# Patient Record
Sex: FEMALE | Race: WHITE | NOT HISPANIC OR LATINO | Employment: OTHER | URBAN - METROPOLITAN AREA
[De-identification: names, ages, dates, MRNs, and addresses within clinical notes are randomized per-mention and may not be internally consistent; named-entity substitution may affect disease eponyms.]

---

## 2017-03-17 ENCOUNTER — OFFICE VISIT (OUTPATIENT)
Dept: MEDICAL GROUP | Facility: CLINIC | Age: 82
End: 2017-03-17
Payer: MEDICARE

## 2017-03-17 ENCOUNTER — HOSPITAL ENCOUNTER (OUTPATIENT)
Dept: LAB | Facility: MEDICAL CENTER | Age: 82
End: 2017-03-17
Attending: INTERNAL MEDICINE
Payer: MEDICARE

## 2017-03-17 VITALS
HEIGHT: 65 IN | OXYGEN SATURATION: 94 % | BODY MASS INDEX: 20.83 KG/M2 | SYSTOLIC BLOOD PRESSURE: 140 MMHG | WEIGHT: 125 LBS | TEMPERATURE: 98.3 F | RESPIRATION RATE: 16 BRPM | DIASTOLIC BLOOD PRESSURE: 80 MMHG | HEART RATE: 67 BPM

## 2017-03-17 DIAGNOSIS — N39.41 URINARY INCONTINENCE, URGE: ICD-10-CM

## 2017-03-17 DIAGNOSIS — I10 ESSENTIAL HYPERTENSION: ICD-10-CM

## 2017-03-17 DIAGNOSIS — R91.1 LUNG NODULE: ICD-10-CM

## 2017-03-17 DIAGNOSIS — M54.41 CHRONIC RIGHT-SIDED LOW BACK PAIN WITH RIGHT-SIDED SCIATICA: ICD-10-CM

## 2017-03-17 DIAGNOSIS — Z86.39 HISTORY OF HYPERCALCEMIA: ICD-10-CM

## 2017-03-17 DIAGNOSIS — N32.81 OVERACTIVE BLADDER: ICD-10-CM

## 2017-03-17 DIAGNOSIS — G89.29 CHRONIC RIGHT-SIDED LOW BACK PAIN WITH RIGHT-SIDED SCIATICA: ICD-10-CM

## 2017-03-17 LAB
ALBUMIN SERPL BCP-MCNC: 4.3 G/DL (ref 3.2–4.9)
ALBUMIN/GLOB SERPL: 1.5 G/DL
ALP SERPL-CCNC: 73 U/L (ref 30–99)
ALT SERPL-CCNC: 10 U/L (ref 2–50)
ANION GAP SERPL CALC-SCNC: 5 MMOL/L (ref 0–11.9)
AST SERPL-CCNC: 20 U/L (ref 12–45)
BILIRUB SERPL-MCNC: 0.6 MG/DL (ref 0.1–1.5)
BUN SERPL-MCNC: 20 MG/DL (ref 8–22)
CALCIUM SERPL-MCNC: 10 MG/DL (ref 8.5–10.5)
CHLORIDE SERPL-SCNC: 102 MMOL/L (ref 96–112)
CO2 SERPL-SCNC: 33 MMOL/L (ref 20–33)
CREAT SERPL-MCNC: 0.7 MG/DL (ref 0.5–1.4)
GLOBULIN SER CALC-MCNC: 2.9 G/DL (ref 1.9–3.5)
GLUCOSE SERPL-MCNC: 112 MG/DL (ref 65–99)
POTASSIUM SERPL-SCNC: 4.9 MMOL/L (ref 3.6–5.5)
PROT SERPL-MCNC: 7.2 G/DL (ref 6–8.2)
PTH-INTACT SERPL-MCNC: 55.3 PG/ML (ref 14–72)
SODIUM SERPL-SCNC: 140 MMOL/L (ref 135–145)

## 2017-03-17 PROCEDURE — G8432 DEP SCR NOT DOC, RNG: HCPCS | Performed by: INTERNAL MEDICINE

## 2017-03-17 PROCEDURE — 1036F TOBACCO NON-USER: CPT | Performed by: INTERNAL MEDICINE

## 2017-03-17 PROCEDURE — 36415 COLL VENOUS BLD VENIPUNCTURE: CPT

## 2017-03-17 PROCEDURE — 4040F PNEUMOC VAC/ADMIN/RCVD: CPT | Performed by: INTERNAL MEDICINE

## 2017-03-17 PROCEDURE — 80053 COMPREHEN METABOLIC PANEL: CPT

## 2017-03-17 PROCEDURE — 1101F PT FALLS ASSESS-DOCD LE1/YR: CPT | Performed by: INTERNAL MEDICINE

## 2017-03-17 PROCEDURE — G8419 CALC BMI OUT NRM PARAM NOF/U: HCPCS | Performed by: INTERNAL MEDICINE

## 2017-03-17 PROCEDURE — G8484 FLU IMMUNIZE NO ADMIN: HCPCS | Performed by: INTERNAL MEDICINE

## 2017-03-17 PROCEDURE — 99214 OFFICE O/P EST MOD 30 MIN: CPT | Performed by: INTERNAL MEDICINE

## 2017-03-17 PROCEDURE — 83970 ASSAY OF PARATHORMONE: CPT

## 2017-03-17 RX ORDER — TOLTERODINE 4 MG/1
4 CAPSULE, EXTENDED RELEASE ORAL DAILY
Qty: 30 CAP | Refills: 3 | Status: SHIPPED | OUTPATIENT
Start: 2017-03-17 | End: 2017-04-21

## 2017-03-17 NOTE — PROGRESS NOTES
"Subjective:  Katelyn is a 81 y.o. female with the following   Past Medical History   Diagnosis Date   • Hypertension    • Dyslipidemia    • Lactose intolerance    • Torn rotator cuff 2008     left, Dr. Montalvo   • OA (osteoarthritis)      right knee, shoulder   • Kidney calculi    • Raynaud's disease /phenomenon    • MEDICAL HOME    • Lung nodules      Dr. Chow, ashley, recommend yearly chest xray      Family History   Problem Relation Age of Onset   • Heart Disease Mother    • Heart Disease Father      enlarged heart   • Diabetes Son    • Cancer Maternal Grandmother      suicide    • Cancer Maternal Grandfather    • Cancer Paternal Grandmother      Lung cancer      The patient is on the following medications:   Current outpatient prescriptions:   •  tolterodine ER (DETROL LA) 4 MG CAPSULE SR 24 HR, Take 1 Cap by mouth every day., Disp: 30 Cap, Rfl: 3  •  lisinopril (PRINIVIL) 5 MG Tab, TAKE 1 TABLET BY MOUTH DAILY -GENERICFOR PRINIVIL, Disp: 90 Tab, Rfl: 0  •  atenolol (TENORMIN) 25 MG Tab, Take 1 Tab by mouth every day. Generic for Tenormin, Disp: 90 Tab, Rfl: 3    HPI; patient is here today complaining of chronic urinary frequency, nocturia and urge incontinence, requesting therapy denies having dysuria, suprapubic pain or flank pain. Patient is also on lisinopril and atenolol for hypertension denies having dry cough or chest pain, she has had history of lung nodule she is due for follow-up chest CT. Complains of intermittent right lower back pain with radiation to the right lower extremity denies any motor or sensory loss.  ROS:  See HPI    Blood pressure 140/80, pulse 67, temperature 36.8 °C (98.3 °F), resp. rate 16, height 1.651 m (5' 5\"), weight 56.7 kg (125 lb), last menstrual period 05/01/1985, SpO2 94 %.on RA  Objective:  Patient is well appearing and in no acute distress.  Pharynx is clear.  Neck is soft and supple with no cervical or supraclavicular lymphadenopathy, thyromegaly or masses, no JVD.  Lungs " clear to auscultation bilaterally with normal respiratory effort. Abdomen soft, non-tender on palpation,not distended. Heart regular rate and rhythm without murmur. Extremities without any clubbing, cyanosis, or edema.    Assessment and Plan:  1. Overactive bladder / urge incontinence . Per patient she has had this condition for number of years but never discussed that with us, requesting therapy. UA is negative for leukoesterase or nitrate, trace blood.      2. Hypertension; currently on lisinopril 5 mg and atenolol 25 mg daily      3. History of lung nodule; patient is due for follow-up chest CT.      4. Intermittent right lower back pain with radiation to right lower extremity.      5. History of borderline hypercalcemia.          Patient is advised on preventive and supportive care regarding current symptoms, Detrol for overactive bladder, follow-up with chest CT. Patient lower back was manipulated, had immediate relief of her lower back pain, instructed on daily extension exercises.   Please note that this dictation was created using voice recognition software. I have worked with consultants from the vendor as well as technical experts from [employer] to optimize the interface. I have made every reasonable attempt to correct obvious errors, but I expect that there are errors of grammar and possibly content that I did not discover before finalizing the note.

## 2017-03-17 NOTE — MR AVS SNAPSHOT
"Katelyn Hill   3/17/2017 10:00 AM   Office Visit   MRN: [id number]    Department:  [facility]   Dept Phone:  [phone]    Description:  Female : 1935   Provider:  Asif Owusu M.D.           Allergies as of 3/17/2017     Allergen Noted Reactions    Iodine 2012   Anaphylaxis, Swelling    Aspirin 2009       Clindamycin 2009       Penicillins 03/10/2008       Robitussin [Guiatuss] 2009   Diarrhea    Shellfish Allergy 2009       Tetanus Toxoid 2009       Zithromax [Azithromycin Dihydrate] 2009         You were diagnosed with     Overactive bladder   [669269]       Lung nodule   [713982]       Essential hypertension   [3234391]       History of hypercalcemia   [004553]         Vital Signs     Blood Pressure Pulse Temperature Respirations Height Weight    140/80 mmHg 67 36.8 °C (98.3 °F) 16 1.651 m (5' 5\") 56.7 kg (125 lb)    Body Mass Index Oxygen Saturation Last Menstrual Period Smoking Status          20.80 kg/m2 94% 1985 Former Smoker        Basic Information     Date Of Birth Sex Race Ethnicity Preferred Language    1935 Female White Non- English      Problem List              ICD-10-CM Priority Class Noted - Resolved    Dyslipidemia E78.5   Unknown - Present    OA (osteoarthritis) M19.90   Unknown - Present    Lactose intolerance E73.9   Unknown - Present    HTN (hypertension) I10   2010 - Present    Torn rotator cuff    2012 - Present    Lung nodules R91.8   Unknown - Present    Head ache R51   2015 - Present    Chest pain R07.9   2015 - Present      Health Maintenance        Date Due Completion Dates    IMM DTaP/Tdap/Td Vaccine (1 - Tdap) 1954 ---    IMM ZOSTER VACCINE 1995 ---    COLON CANCER SCREENING ANNUAL FIT 2016, 2014, 12/10/2012    IMM INFLUENZA (1) 2016 10/24/2015, 10/5/2014, 2011, 10/25/2009    BONE DENSITY 2021, 7/3/2006            "   Current Immunizations     13-VALENT PCV PREVNAR 10/24/2015    Influenza TIV (IM) 10/24/2015, 10/5/2014, 9/22/2011    Influenza Vaccine Pediatric 10/25/2009    Pneumococcal Vaccine (UF)Historical Data 10/25/2009    Pneumococcal polysaccharide vaccine (PPSV-23) 1/11/2013      Below and/or attached are the medications your provider expects you to take. Review all of your home medications and newly ordered medications with your provider and/or pharmacist. Follow medication instructions as directed by your provider and/or pharmacist. Please keep your medication list with you and share with your provider. Update the information when medications are discontinued, doses are changed, or new medications (including over-the-counter products) are added; and carry medication information at all times in the event of emergency situations     Allergies:  IODINE - Anaphylaxis,Swelling     ASPIRIN - (reactions not documented)     CLINDAMYCIN - (reactions not documented)     PENICILLINS - (reactions not documented)     ROBITUSSIN - Diarrhea     SHELLFISH ALLERGY - (reactions not documented)     TETANUS TOXOID - (reactions not documented)     ZITHROMAX - (reactions not documented)               Medications  Valid as of: March 17, 2017 - 10:39 AM    Generic Name Brand Name Tablet Size Instructions for use    Atenolol (Tab) TENORMIN 25 MG Take 1 Tab by mouth every day. Generic for Tenormin        Lisinopril (Tab) PRINIVIL 5 MG TAKE 1 TABLET BY MOUTH DAILY -GENERICFOR PRINIVIL        Tolterodine Tartrate (CAPSULE SR 24 HR) DETROL LA 4 MG Take 1 Cap by mouth every day.        .                 Medicines prescribed today were sent to:     [id number] [place] - [address] [facility]    [address] [place] [address]    Phone: [phone] Fax: [phone]    Open 24 Hours?: No      Medication refill instructions:       If your prescription bottle indicates you have medication refills left, it is not necessary to call  your provider’s office. Please contact your pharmacy and they will refill your medication.    If your prescription bottle indicates you do not have any refills left, you may request refills at any time through one of the following ways: The online [employer] system (except Urgent Care), by calling your provider’s office, or by asking your pharmacy to contact your provider’s office with a refill request. Medication refills are processed only during regular business hours and may not be available until the next business day. Your provider may request additional information or to have a follow-up visit with you prior to refilling your medication.   *Please Note: Medication refills are assigned a new Rx number when refilled electronically. Your pharmacy may indicate that no refills were authorized even though a new prescription for the same medication is available at the pharmacy. Please request the medicine by name with the pharmacy before contacting your provider for a refill.        Your To Do List     Future Labs/Procedures Complete By Expires    COMP METABOLIC PANEL  As directed 3/18/2018    PTH INTACT (PTH ONLY)  As directed 3/18/2018      Other Notes About Your Plan     Katelyn is a Medical Home Patient with Dr. Asif Owusu.    This appointment is 4-18-16    No Query           [facility] Status: Patient Declined

## 2017-03-20 ENCOUNTER — TELEPHONE (OUTPATIENT)
Dept: MEDICAL GROUP | Facility: CLINIC | Age: 82
End: 2017-03-20

## 2017-03-20 NOTE — TELEPHONE ENCOUNTER
----- Message from Magui Martinez M.D. sent at 3/20/2017  6:41 AM PDT -----  Please let patient know that her blood sugar was slightly elevated at 112. The remainder of her labs were within normal limits.  Dr. Martinez covering for Dr. Owusu

## 2017-03-23 DIAGNOSIS — I10 ESSENTIAL HYPERTENSION: ICD-10-CM

## 2017-03-23 RX ORDER — LISINOPRIL 5 MG/1
TABLET ORAL
Qty: 90 TAB | Refills: 0 | Status: SHIPPED | OUTPATIENT
Start: 2017-03-23 | End: 2017-06-26 | Stop reason: SDUPTHER

## 2017-03-23 NOTE — TELEPHONE ENCOUNTER
Was the patient seen in the last year in this department? Yes     Does patient have an active prescription for medications requested? No     Received Request Via: Pharmacy    RX: Lisinopril  Pharmacy: [facility]

## 2017-03-31 ENCOUNTER — TELEPHONE (OUTPATIENT)
Dept: MEDICAL GROUP | Facility: CLINIC | Age: 82
End: 2017-03-31

## 2017-03-31 ENCOUNTER — PATIENT OUTREACH (OUTPATIENT)
Dept: HEALTH INFORMATION MANAGEMENT | Facility: OTHER | Age: 82
End: 2017-03-31

## 2017-03-31 DIAGNOSIS — Z12.11 SCREENING FOR COLORECTAL CANCER: ICD-10-CM

## 2017-03-31 DIAGNOSIS — Z12.12 SCREENING FOR COLORECTAL CANCER: ICD-10-CM

## 2017-03-31 NOTE — PROGRESS NOTES
3/31/17  -  Attempt #:1    Verify PCP: yes    Communication Preference Obtained: yes     Review Care Team: yes    Annual Wellness Visit Scheduling  1. Scheduling Status:Scheduled        Care Gap Scheduling (Attempt to Schedule EACH Overdue Care Gap!)     Health Maintenance Due   Topic Date Due   • IMM DTaP/Tdap/Td Vaccine (1 - Tdap) Declined    • IMM ZOSTER VACCINE  Declined   • Annual Wellness Visit  Scheduled (Taxi voucher provided for lack of transportation)   • COLON CANCER SCREENING ANNUAL FIT  Order requested         MyChart Activation: declined

## 2017-04-03 ENCOUNTER — TELEPHONE (OUTPATIENT)
Dept: URGENT CARE | Facility: CLINIC | Age: 82
End: 2017-04-03

## 2017-04-03 NOTE — TELEPHONE ENCOUNTER
1. Caller Name: Katelyn                                         Call Back Number: [phone]      Patient approves a detailed voicemail message: yes    Patient called stating she went to get a CT scan but the  had told her they needed an order for it. Her chart has a cancelled order for the CT she believed to have. She is wondering if she still needs the CT done. Please advice.

## 2017-04-04 DIAGNOSIS — R91.8 LUNG NODULES: ICD-10-CM

## 2017-04-12 ENCOUNTER — TELEPHONE (OUTPATIENT)
Dept: MEDICAL GROUP | Facility: CLINIC | Age: 82
End: 2017-04-12

## 2017-04-12 NOTE — TELEPHONE ENCOUNTER
ANNUAL WELLNESS VISIT PRE-VISIT PLANNING     1.  Reviewed last PCP office visit assessment and plan notes: Yes    2.  If any orders were placed last visit do we have Results/Consult Notes?        •  Labs? Yes         •  Imaging?        •  Referrals?     3.  Patient Care Coordination Note was updated with diagnosis information:  No    4.  Patient is due for these Health Maintenance Topics:   Health Maintenance Due   Topic Date Due   • IMM DTaP/Tdap/Td Vaccine (1 - Tdap) 04[birth year]06/1954   • IMM ZOSTER VACCINE  04/06/1995   • Annual Wellness Visit  05/20/2015   • COLON CANCER SCREENING ANNUAL FIT  06/30/2016          •  DAVEY letter was faxed to:   for  records    5.  Immunizations were updated in [place] using [employer]?: Yes       •  [employer] Recommendations:  Tdap, Shingles, Hep A , Hep B       •  Is patient due for Tdap/Shingles? Yes.  If yes, was patient alerted of copay? Yes        7.  Updated Care Team with [employer] and all specialists?        •   Gait devices, O2, CPAP, etc: yes , [employer]       •   Eye professional: yes       •   Other specialists (GYN, cardiology, endo, etc): no    8.  Is patient in need of any refills prior to office visit? No       •    Separate refill encounter created?: no    9.  Patient was informed to arrive 15 min prior to their scheduled appointment and bring in their medication bottles? yes    10.  Patient was advised: “This is a free wellness visit. The provider will screen for medical conditions to help you stay healthy. If you have other concerns to address you may be asked to discuss these at a separate visit or there may be an additional fee.”  Yes

## 2017-04-15 ENCOUNTER — HOSPITAL ENCOUNTER (OUTPATIENT)
Dept: RADIOLOGY | Facility: MEDICAL CENTER | Age: 82
End: 2017-04-15
Attending: INTERNAL MEDICINE
Payer: MEDICARE

## 2017-04-15 DIAGNOSIS — R91.8 LUNG NODULES: ICD-10-CM

## 2017-04-15 PROCEDURE — 71250 CT THORAX DX C-: CPT

## 2017-04-21 ENCOUNTER — OFFICE VISIT (OUTPATIENT)
Dept: MEDICAL GROUP | Facility: CLINIC | Age: 82
End: 2017-04-21
Payer: MEDICARE

## 2017-04-21 VITALS
RESPIRATION RATE: 16 BRPM | SYSTOLIC BLOOD PRESSURE: 120 MMHG | HEART RATE: 61 BPM | OXYGEN SATURATION: 93 % | HEIGHT: 65 IN | BODY MASS INDEX: 21.16 KG/M2 | WEIGHT: 127 LBS | DIASTOLIC BLOOD PRESSURE: 80 MMHG | TEMPERATURE: 98.3 F

## 2017-04-21 DIAGNOSIS — I10 ESSENTIAL HYPERTENSION: ICD-10-CM

## 2017-04-21 DIAGNOSIS — R73.01 IFG (IMPAIRED FASTING GLUCOSE): ICD-10-CM

## 2017-04-21 DIAGNOSIS — R32 URINARY INCONTINENCE, UNSPECIFIED TYPE: ICD-10-CM

## 2017-04-21 DIAGNOSIS — R91.1 LUNG NODULE: ICD-10-CM

## 2017-04-21 DIAGNOSIS — N32.81 OVERACTIVE BLADDER: ICD-10-CM

## 2017-04-21 PROCEDURE — 1036F TOBACCO NON-USER: CPT | Performed by: INTERNAL MEDICINE

## 2017-04-21 PROCEDURE — 4040F PNEUMOC VAC/ADMIN/RCVD: CPT | Performed by: INTERNAL MEDICINE

## 2017-04-21 PROCEDURE — 1101F PT FALLS ASSESS-DOCD LE1/YR: CPT | Performed by: INTERNAL MEDICINE

## 2017-04-21 PROCEDURE — G8432 DEP SCR NOT DOC, RNG: HCPCS | Performed by: INTERNAL MEDICINE

## 2017-04-21 PROCEDURE — G8420 CALC BMI NORM PARAMETERS: HCPCS | Performed by: INTERNAL MEDICINE

## 2017-04-21 PROCEDURE — 99214 OFFICE O/P EST MOD 30 MIN: CPT | Performed by: INTERNAL MEDICINE

## 2017-04-21 ASSESSMENT — PATIENT HEALTH QUESTIONNAIRE - PHQ9: CLINICAL INTERPRETATION OF PHQ2 SCORE: 0

## 2017-04-21 NOTE — PROGRESS NOTES
"Subjective:  Katelyn is a 82 y.o. female with the following   Past Medical History   Diagnosis Date   • Hypertension    • Dyslipidemia    • Lactose intolerance    • Torn rotator cuff 2008     left, Dr. Montalvo   • OA (osteoarthritis)      right knee, shoulder   • Kidney calculi    • Raynaud's disease /phenomenon    • MEDICAL HOME    • Lung nodules      Dr. Chow, ashley, recommend yearly chest xray      Family History   Problem Relation Age of Onset   • Heart Disease Mother    • Heart Disease Father      enlarged heart   • Diabetes Son    • Cancer Maternal Grandmother      suicide    • Cancer Maternal Grandfather    • Cancer Paternal Grandmother      Lung cancer      The patient is on the following medications:   Current outpatient prescriptions:   •  lisinopril (PRINIVIL) 5 MG Tab, TAKE 1 TABLET BY MOUTH DAILY -GENERICFOR PRINIVIL, Disp: 90 Tab, Rfl: 0  •  atenolol (TENORMIN) 25 MG Tab, Take 1 Tab by mouth every day. Generic for Tenormin, Disp: 90 Tab, Rfl: 3    HPI; adjust her today for follow-up visit, previously treated with Detrol for overactive bladder and urge incontinence, patient could not tolerate the medication due to acute side effects of lower abdominal and pelvic pain, symptoms have resolved after being off the medication, continues with urinary issues.  Patient is on lisinopril and atenolol for hypertension denies having dry cough, shortness of breath or chest pain, stating that her lower back pain with radiculopathy has significantly improved on daily lower back extension exercises as instructed, denies having motor or sensory loss. Patient also has had history of lung nodule, recently had follow-up chest CT denies having cough or unintentional weight loss.  ROS:  See HPI    Blood pressure 120/80, pulse 61, temperature 36.8 °C (98.3 °F), resp. rate 16, height 1.651 m (5' 5\"), weight 57.607 kg (127 lb), last menstrual period 05/01/1985, SpO2 93 %.on RA  Objective:  Patient is well appearing and in no " acute distress.  Pharynx is clear.  Neck is soft and supple with no cervical or supraclavicular lymphadenopathy, thyromegaly or masses, no JVD.  Lungs clear to auscultation bilaterally with normal respiratory effort. Abdomen soft, non-tender on palpation,not distended. Heart regular rate and rhythm without murmur. Extremities without any clubbing, cyanosis, or edema.    Assessment and Plan:  1. Overactive bladder / urge incontinence . Per patient could not tolerate detrol due side effect of lower abdominal / pelvic pain.      2. Hypertension; currently on lisinopril 5 mg and atenolol 25 mg daily      3. History of lung nodule;follow-up chest CT.  FINDINGS:    THORACIC INLET: The thyroid gland appears unremarkable. No pathologic lymphadenopathy.  AXILLA: No pathologic lymphadenopathy.  MEDIASTINUM: Lack of IV contrast diminishes sensitivity for detection and characterization of soft tissue lesions. No pathologic lymphadenopathy.  HEART: Normal size. No pericardial effusion.  VASCULAR STRUCTURES: Thoracic aorta appears unremarkable. Origins of the great vessels appear normal. There is moderate calcific atherosclerotic plaque.  PLEURA: No effusion or pneumothorax.  LUNGS: There is linear scar in the LEFT lower lobe. Contiguous with this inferiorly is a 6 x 8 mm pulmonary nodule or area of nodular scar on axial image 149 of series 2. Size is unchanged.  BONES: No suspicious lytic or sclerotic bony lesions identified.  UPPER ABDOMEN: There is an incompletely imaged fluid density LEFT renal lesion measuring up to 11 mm which is likely a cyst. There is a coarse calcification in segment 2 of the liver, as before.         Impression        1.  Unchanged 7 mm LEFT lower lobe pulmonary nodule, very likely benign  2.  Atherosclerosis           4. Intermittent right lower back pain with radiation to right lower extremity. Per  patient has responded to daily lower back extension exercises as instructed.      5. Impaired fasting  glucose.   Ref. Range 8/12/2015 21:00 3/17/2017 13:18   Glucose Latest Ref Range: 65-99 mg/dL 101 (H) 112 (H)       Patient is advised on preventive and supportive care, diet and lifestyle modification, daily walking ,exercise. Referral to urology for further evaluation and monitor lab.    Patient is advised on preventive and supportive care regarding current symptoms,Please note that this dictation was created using voice recognition software. I have worked with consultants from the vendor as well as technical experts from [employer] to optimize the interface. I have made every reasonable attempt to correct obvious errors, but I expect that there are errors of grammar and possibly content that I did not discover before finalizing the note.

## 2017-04-21 NOTE — MR AVS SNAPSHOT
"Katelyn Hill   2017 10:40 AM   Office Visit   MRN: [id number]    Department:  [facility]   Dept Phone:  [phone]    Description:  Female : 1935   Provider:  Asif Owusu M.D.           Allergies as of 2017     Allergen Noted Reactions    Iodine 2012   Anaphylaxis, Swelling    Aspirin 2009       Clindamycin 2009       Penicillins 03/10/2008       Robitussin [Guiatuss] 2009   Diarrhea    Shellfish Allergy 2009       Tetanus Toxoid 2009       Zithromax [Azithromycin Dihydrate] 2009         You were diagnosed with     Urinary incontinence, unspecified type   [3756126]       Overactive bladder   [491283]       IFG (impaired fasting glucose)   [957087]         Vital Signs     Blood Pressure Pulse Temperature Respirations Height Weight    120/80 mmHg 61 36.8 °C (98.3 °F) 16 1.651 m (5' 5\") 57.607 kg (127 lb)    Body Mass Index Oxygen Saturation Last Menstrual Period Smoking Status          21.13 kg/m2 93% 1985 Former Smoker        Basic Information     Date Of Birth Sex Race Ethnicity Preferred Language    1935 Female White Non- English      Your appointments     May 19, 2017 10:00 AM   ANNUAL WELLNESS with Asif Owusu M.D., [facility]    [facility] ([facility])    [address] [place] [id number] [phone]              Problem List              ICD-10-CM Priority Class Noted - Resolved    Dyslipidemia E78.5   Unknown - Present    OA (osteoarthritis) M19.90   Unknown - Present    Lactose intolerance E73.9   Unknown - Present    HTN (hypertension) I10   2010 - Present    Torn rotator cuff    2012 - Present    Lung nodules R91.8   Unknown - Present    Head ache R51   2015 - Present    Chest pain R07.9   2015 - Present      Health Maintenance        Date Due Completion Dates    IMM DTaP/Tdap/Td Vaccine (1 - Tdap) 1954 ---    IMM ZOSTER " VACCINE 4/6/1995 ---    COLON CANCER SCREENING ANNUAL FIT 6/30/2016 6/30/2015, 5/28/2014, 12/10/2012    BONE DENSITY 9/12/2021 9/12/2011, 7/3/2006            Current Immunizations     13-VALENT PCV PREVNAR 10/24/2015    Influenza TIV (IM) 10/24/2015, 10/5/2014, 9/22/2011    Influenza Vaccine Adult HD 9/24/2016    Influenza Vaccine Pediatric 10/25/2009    Influenza Vaccine Quad Inj (Preserved) 9/28/2013, 10/21/2012    Pneumococcal Vaccine (UF)Historical Data 10/25/2009    Pneumococcal polysaccharide vaccine (PPSV-23) 1/11/2013      Below and/or attached are the medications your provider expects you to take. Review all of your home medications and newly ordered medications with your provider and/or pharmacist. Follow medication instructions as directed by your provider and/or pharmacist. Please keep your medication list with you and share with your provider. Update the information when medications are discontinued, doses are changed, or new medications (including over-the-counter products) are added; and carry medication information at all times in the event of emergency situations     Allergies:  IODINE - Anaphylaxis,Swelling     ASPIRIN - (reactions not documented)     CLINDAMYCIN - (reactions not documented)     PENICILLINS - (reactions not documented)     ROBITUSSIN - Diarrhea     SHELLFISH ALLERGY - (reactions not documented)     TETANUS TOXOID - (reactions not documented)     ZITHROMAX - (reactions not documented)               Medications  Valid as of: April 21, 2017 - 11:01 AM    Generic Name Brand Name Tablet Size Instructions for use    Atenolol (Tab) TENORMIN 25 MG Take 1 Tab by mouth every day. Generic for Tenormin        Lisinopril (Tab) PRINIVIL 5 MG TAKE 1 TABLET BY MOUTH DAILY -GENERICFOR PRINIVIL        .                 Medicines prescribed today were sent to:     [id number] - [place] - [address]    [address] [place] [address]    Phone: [phone] Fax:  [phone]    Open 24 Hours?: No      Medication refill instructions:       If your prescription bottle indicates you have medication refills left, it is not necessary to call your provider’s office. Please contact your pharmacy and they will refill your medication.    If your prescription bottle indicates you do not have any refills left, you may request refills at any time through one of the following ways: The online [employer] system (except Urgent Care), by calling your provider’s office, or by asking your pharmacy to contact your provider’s office with a refill request. Medication refills are processed only during regular business hours and may not be available until the next business day. Your provider may request additional information or to have a follow-up visit with you prior to refilling your medication.   *Please Note: Medication refills are assigned a new Rx number when refilled electronically. Your pharmacy may indicate that no refills were authorized even though a new prescription for the same medication is available at the pharmacy. Please request the medicine by name with the pharmacy before contacting your provider for a refill.        Your To Do List     Future Labs/Procedures Complete By Expires    HEMOGLOBIN A1C  As directed 4/21/2018      Referral     A referral request has been sent to our patient care coordination department. Please allow 3-5 business days for us to process this request and contact you either by phone or mail. If you do not hear from us by the 5th business day, please call us at [phone].        Other Notes About Your Plan     Katelyn is a Medical Home Patient with Dr. Asif Owusu.    This appointment is 4-18-16    No Query           [facility]: Patient Declined

## 2017-04-24 DIAGNOSIS — N30.00 ACUTE CYSTITIS WITHOUT HEMATURIA: ICD-10-CM

## 2017-04-24 RX ORDER — CIPROFLOXACIN 250 MG/1
250 TABLET, FILM COATED ORAL 2 TIMES DAILY
Qty: 10 TAB | Refills: 0 | Status: SHIPPED | OUTPATIENT
Start: 2017-04-24 | End: 2018-04-11

## 2017-04-29 ENCOUNTER — HOSPITAL ENCOUNTER (OUTPATIENT)
Dept: LAB | Facility: MEDICAL CENTER | Age: 82
End: 2017-04-29
Attending: INTERNAL MEDICINE
Payer: MEDICARE

## 2017-04-29 DIAGNOSIS — R73.01 IFG (IMPAIRED FASTING GLUCOSE): ICD-10-CM

## 2017-04-29 LAB
EST. AVERAGE GLUCOSE BLD GHB EST-MCNC: 114 MG/DL
HBA1C MFR BLD: 5.6 % (ref 0–5.6)

## 2017-04-29 PROCEDURE — 83036 HEMOGLOBIN GLYCOSYLATED A1C: CPT

## 2017-04-29 PROCEDURE — 36415 COLL VENOUS BLD VENIPUNCTURE: CPT

## 2017-05-19 ENCOUNTER — OFFICE VISIT (OUTPATIENT)
Dept: MEDICAL GROUP | Facility: PHYSICIAN GROUP | Age: 82
End: 2017-05-19
Payer: MEDICARE

## 2017-05-19 VITALS
BODY MASS INDEX: 20.79 KG/M2 | TEMPERATURE: 98.4 F | SYSTOLIC BLOOD PRESSURE: 155 MMHG | WEIGHT: 124.78 LBS | RESPIRATION RATE: 16 BRPM | HEIGHT: 65 IN | DIASTOLIC BLOOD PRESSURE: 88 MMHG | HEART RATE: 59 BPM | OXYGEN SATURATION: 95 %

## 2017-05-19 DIAGNOSIS — N32.81 OVERACTIVE BLADDER: ICD-10-CM

## 2017-05-19 DIAGNOSIS — R73.01 IMPAIRED FASTING GLUCOSE: ICD-10-CM

## 2017-05-19 DIAGNOSIS — I10 ESSENTIAL HYPERTENSION: ICD-10-CM

## 2017-05-19 DIAGNOSIS — R91.1 LUNG NODULE: ICD-10-CM

## 2017-05-19 PROCEDURE — G8432 DEP SCR NOT DOC, RNG: HCPCS | Performed by: INTERNAL MEDICINE

## 2017-05-19 PROCEDURE — 1036F TOBACCO NON-USER: CPT | Performed by: INTERNAL MEDICINE

## 2017-05-19 PROCEDURE — G0439 PPPS, SUBSEQ VISIT: HCPCS | Performed by: INTERNAL MEDICINE

## 2017-05-19 ASSESSMENT — PAIN SCALES - GENERAL: PAINLEVEL: 8=MODERATE-SEVERE PAIN

## 2017-05-19 ASSESSMENT — PATIENT HEALTH QUESTIONNAIRE - PHQ9: CLINICAL INTERPRETATION OF PHQ2 SCORE: 0

## 2017-05-19 ASSESSMENT — ACTIVITIES OF DAILY LIVING (ADL): TRANSPORTATION COMMENTS: TAXI

## 2017-05-19 NOTE — PROGRESS NOTES
Chief Complaint   Patient presents with   • Annual Wellness Visit         HPI:  Katelyn Hill is a 82 y.o. female here for Medicare Annual Wellness Visit. She is currently on lisinopril and atenolol for hypertension denies having dry cough, shortness of breath or chest pain. Patient has had overactive bladder and urinary incontinence, intolerant to oral medications, she has been waiting for urology consultation and evaluation, denies having hematuria or flank pain.        Patient Active Problem List    Diagnosis Date Noted   • Head ache 04/28/2015   • Chest pain 04/28/2015   • Lung nodules    • Torn rotator cuff 07/02/2012   • HTN (hypertension) 05/13/2010   • Dyslipidemia    • OA (osteoarthritis)    • Lactose intolerance        Current Outpatient Prescriptions   Medication Sig Dispense Refill   • ciprofloxacin (CIPRO) 250 MG Tab Take 1 Tab by mouth 2 times a day. 10 Tab 0   • lisinopril (PRINIVIL) 5 MG Tab TAKE 1 TABLET BY MOUTH DAILY -GENERICFOR PRINIVIL 90 Tab 0   • atenolol (TENORMIN) 25 MG Tab Take 1 Tab by mouth every day. Generic for Tenormin 90 Tab 3     No current facility-administered medications for this visit.        Patient is taking medications as noted in medication list.  Current supplements as per medication list.   Chronic narcotic pain medicines: no    Allergies: Iodine; Aspirin; Clindamycin; Penicillins; Robitussin; Shellfish allergy; Tetanus toxoid; and Zithromax    Current social contact/activities: dinner with son     Is patient current with immunizations?  Yes.     She  reports that she quit smoking about 32 years ago. Her smoking use included Cigarettes. She has a 30 pack-year smoking history. She has never used smokeless tobacco. She reports that she does not drink alcohol or use illicit drugs.  Counseling given: Not Answered        DPA/Advanced Directive:  Patient has Living Will, but it is not on file. Instructed to bring in a copy to scan into their chart.    ROS:    Gait:  normal  Ostomy: no   Other tubes: no   Amputations: no   Chronic oxygen use: no   Last eye exam: 02/17   Wears hearing aids: no   : Reports incontinence.       Screening:      Little interest or pleasure in doing things?  0 - not at all  Feeling down, depressed, or hopeless?  0 - not at all  Patient Health Questionnaire Score: 0  If depressive symptoms identified deferred to follow up visit unless specifically addressed in assessment and plan.    Interpretation of PHQ-9 Total Score   Score Severity   1-4 Minimal Depression   5-9 Mild Depression   10-14 Moderate Depression   15-19 Moderately Severe Depression   20-27 Severe Depression    Screening for Cognitive Impairment    Three Minute Recall (banana, sunrise, fence)  3/3    Draw clock face with all 12 numbers set to the hand to show 10 minutes past 11 o'clock  1 4/5  If cognitive concerns identified deferred to follow up visit unless specifically addressed in assessment and plan.    Fall Risk Assessment    Has the patient had two or more falls in the last year or any fall with injury in the last year?     If Fall Risk identified deferred to follow up visit unless specifically addressed in assessment and plan.    Safety Assessment    Throw rugs on floor.  Yes  Handrails on all stairs.  Yes  Good lighting in all hallways.  Yes  Difficulty hearing.  No  Patient counseled about all safety risks that were identified.    Functional Assessment ADLs    Are there any barriers preventing you from cooking for yourself or meeting nutritional needs?  No.    Are there any barriers preventing you from driving safely or obtaining transportation?  Yes. taxi  Are there any barriers preventing you from using a telephone or calling for help?  No.    Are there any barriers preventing you from shopping?  No.    Are there any barriers preventing you from taking care of your own finances?  No.    Are there any barriers preventing you from managing your medications?  No.    Are  "currently engaging any exercise or physical activity?  Yes.  walking    Health Maintenance Summary                IMM DTaP/Tdap/Td Vaccine Overdue 4/6/1954     IMM ZOSTER VACCINE Overdue 4/6/1995     Annual Wellness Visit Overdue 5/20/2015      Done 5/19/2014     COLON CANCER SCREENING ANNUAL FIT Overdue 6/30/2016      Done 6/30/2015 OCCULT BLOOD FECES IMMUNOASSAY     Patient has more history with this topic...    BONE DENSITY Next Due 9/12/2021      Done 9/12/2011 DS-BONE DENSITY STUDY (DEXA)     Patient has more history with this topic...          Patient Care Team:  Asif Owusu M.D. as PCP - General (Internal Medicine)    Social History   Substance Use Topics   • Smoking status: Former Smoker -- 1.00 packs/day for 30 years     Types: Cigarettes     Quit date: 01/01/1985   • Smokeless tobacco: Never Used      Comment: 1 ppd for 30 years, [id number]   • Alcohol Use: No     Family History   Problem Relation Age of Onset   • Heart Disease Mother    • Heart Disease Father      enlarged heart   • Diabetes Son    • Cancer Maternal Grandmother      suicide    • Cancer Maternal Grandfather    • Cancer Paternal Grandmother      Lung cancer      She  has a past medical history of Hypertension; Dyslipidemia; Lactose intolerance; Torn rotator cuff (2008); OA (osteoarthritis); Kidney calculi; Raynaud's disease /phenomenon; MEDICAL HOME; and Lung nodules. She also has no past medical history of Hypercholesteremia or Breast cancer (CMS-HCC).   Past Surgical History   Procedure Laterality Date   • Shoulder arthroscopy       right   • Appendectomy  1943   • Tonsillectomy     • Knee arthroscopy       right           Exam:     Blood pressure 155/88, pulse 59, temperature 36.9 °C (98.4 °F), resp. rate 16, height 1.651 m (5' 5\"), weight 56.6 kg (124 lb 12.5 oz), last menstrual period 05/01/1985, SpO2 95 %. Body mass index is 20.76 kg/(m^2).    Hearing ; good  Dentition fair  Alert, oriented in no acute distress.  Eye contact is " good, speech goal directed, affect calm      Assessment and Plan. The following treatment and monitoring plan is recommended:      1. Overactive bladder / urge incontinence . Per patient could not tolerate detrol due side effect of lower abdominal / pelvic pain. He has been referred to urology for further evaluation.      2. Hypertension; currently on lisinopril 5 mg and atenolol 25 mg daily, today BP is elevated.      3. History of lung nodule;follow-up chest CT.  FINDINGS:    THORACIC INLET: The thyroid gland appears unremarkable. No pathologic lymphadenopathy.  AXILLA: No pathologic lymphadenopathy.  MEDIASTINUM: Lack of IV contrast diminishes sensitivity for detection and characterization of soft tissue lesions. No pathologic lymphadenopathy.  HEART: Normal size. No pericardial effusion.  VASCULAR STRUCTURES: Thoracic aorta appears unremarkable. Origins of the great vessels appear normal. There is moderate calcific atherosclerotic plaque.  PLEURA: No effusion or pneumothorax.  LUNGS: There is linear scar in the LEFT lower lobe. Contiguous with this inferiorly is a 6 x 8 mm pulmonary nodule or area of nodular scar on axial image 149 of series 2. Size is unchanged.  BONES: No suspicious lytic or sclerotic bony lesions identified.  UPPER ABDOMEN: There is an incompletely imaged fluid density LEFT renal lesion measuring up to 11 mm which is likely a cyst. There is a coarse calcification in segment 2 of the liver, as before.           Impression          1.  Unchanged 7 mm LEFT lower lobe pulmonary nodule, very likely benign  2.  Atherosclerosis            4. Intermittent right lower back pain with radiation to right lower extremity. Per  patient has responded to daily lower back extension exercises as instructed.      5. Impaired fasting glucose.    Ref. Range  8/12/2015 21:00  3/17/2017 13:18    Glucose  Latest Ref Range: 65-99 mg/dL  101 (H)  112 (H)        Patient is advised on preventive and supportive care,  diet and lifestyle modification, daily walking ,exercise,  follow-up with urology consultation , monitor blood pressure and follow labs.              Services suggested:   Health Care Screening recommendations as per orders if indicated.  Referrals offered: PT/OT/Nutrition counseling/Behavioral Health/Smoking cessation as per orders if indicated.    Discussion today about general wellness and lifestyle habits:    · Prevent falls and reduce trip hazards; Cautioned about securing or removing rugs.  · Have a working fire alarm and carbon monoxide detector;   · Engage in regular physical activity and social activities       Follow-up: No Follow-up on file.

## 2017-05-19 NOTE — MR AVS SNAPSHOT
"Katelyn Hill   2017 10:00 AM   Office Visit   MRN: [id number]    Department:  [facility]   Dept Phone:  [phone]    Description:  Female : 1935   Provider:  Asif Owusu M.D.; [facility]            Reason for Visit     Annual Wellness Visit           Allergies as of 2017     Allergen Noted Reactions    Iodine 2012   Anaphylaxis, Swelling    Aspirin 2009       Clindamycin 2009       Penicillins 03/10/2008       Robitussin [Guiatuss] 2009   Diarrhea    Shellfish Allergy 2009       Tetanus Toxoid 2009       Zithromax [Azithromycin Dihydrate] 2009         Vital Signs     Blood Pressure Pulse Temperature Respirations Height Weight    155/88 mmHg 59 36.9 °C (98.4 °F) 16 1.651 m (5' 5\") 56.6 kg (124 lb 12.5 oz)    Body Mass Index Oxygen Saturation Last Menstrual Period Smoking Status          20.76 kg/m2 95% 1985 Former Smoker        Basic Information     Date Of Birth Sex Race Ethnicity Preferred Language    1935 Female White Non- English      Problem List              ICD-10-CM Priority Class Noted - Resolved    Dyslipidemia E78.5   Unknown - Present    OA (osteoarthritis) M19.90   Unknown - Present    Lactose intolerance E73.9   Unknown - Present    HTN (hypertension) I10   2010 - Present    Torn rotator cuff    2012 - Present    Lung nodules R91.8   Unknown - Present    Head ache R51   2015 - Present    Chest pain R07.9   2015 - Present      Health Maintenance        Date Due Completion Dates    IMM DTaP/Tdap/Td Vaccine (1 - Tdap) 1954 ---    IMM ZOSTER VACCINE 1995 ---    COLON CANCER SCREENING ANNUAL FIT 2016, 2014, 12/10/2012    BONE DENSITY 2021, 7/3/2006            Current Immunizations     13-VALENT PCV PREVNAR 10/24/2015    Influenza TIV (IM) 10/24/2015, 10/5/2014, 2011    Influenza Vaccine Adult HD 2016    Influenza Vaccine " Pediatric 10/25/2009    Influenza Vaccine Quad Inj (Preserved) 9/28/2013, 10/21/2012    Pneumococcal Vaccine (UF)Historical Data 10/25/2009    Pneumococcal polysaccharide vaccine (PPSV-23) 1/11/2013      Below and/or attached are the medications your provider expects you to take. Review all of your home medications and newly ordered medications with your provider and/or pharmacist. Follow medication instructions as directed by your provider and/or pharmacist. Please keep your medication list with you and share with your provider. Update the information when medications are discontinued, doses are changed, or new medications (including over-the-counter products) are added; and carry medication information at all times in the event of emergency situations     Allergies:  IODINE - Anaphylaxis,Swelling     ASPIRIN - (reactions not documented)     CLINDAMYCIN - (reactions not documented)     PENICILLINS - (reactions not documented)     ROBITUSSIN - Diarrhea     SHELLFISH ALLERGY - (reactions not documented)     TETANUS TOXOID - (reactions not documented)     ZITHROMAX - (reactions not documented)               Medications  Valid as of: May 19, 2017 - 11:02 AM    Generic Name Brand Name Tablet Size Instructions for use    Atenolol (Tab) TENORMIN 25 MG Take 1 Tab by mouth every day. Generic for Tenormin        Ciprofloxacin HCl (Tab) CIPRO 250 MG Take 1 Tab by mouth 2 times a day.        Lisinopril (Tab) PRINIVIL 5 MG TAKE 1 TABLET BY MOUTH DAILY -GENERICFOR PRINIVIL        .                 Medicines prescribed today were sent to:     [id number] - [place] - [address] [facility]    [address] [facility] [place] [address]    Phone: [phone] Fax: [phone]    Open 24 Hours?: No      Medication refill instructions:       If your prescription bottle indicates you have medication refills left, it is not necessary to call your provider’s office. Please contact your pharmacy and they will refill your  medication.    If your prescription bottle indicates you do not have any refills left, you may request refills at any time through one of the following ways: The online [employer] system (except Urgent Care), by calling your provider’s office, or by asking your pharmacy to contact your provider’s office with a refill request. Medication refills are processed only during regular business hours and may not be available until the next business day. Your provider may request additional information or to have a follow-up visit with you prior to refilling your medication.   *Please Note: Medication refills are assigned a new Rx number when refilled electronically. Your pharmacy may indicate that no refills were authorized even though a new prescription for the same medication is available at the pharmacy. Please request the medicine by name with the pharmacy before contacting your provider for a refill.        Other Notes About Your Plan     Katelyn is a Medical Home Patient with Dr. Asif Owusu.    This appointment is 4-18-16    No Query           [employer] Status: Patient Declined

## 2017-06-26 DIAGNOSIS — I10 ESSENTIAL HYPERTENSION: ICD-10-CM

## 2017-06-26 RX ORDER — LISINOPRIL 5 MG/1
TABLET ORAL
Qty: 90 TAB | Refills: 0 | Status: SHIPPED | OUTPATIENT
Start: 2017-06-26 | End: 2017-09-26 | Stop reason: SDUPTHER

## 2017-09-26 DIAGNOSIS — I10 ESSENTIAL HYPERTENSION: ICD-10-CM

## 2017-09-27 RX ORDER — LISINOPRIL 5 MG/1
TABLET ORAL
Qty: 90 TAB | Refills: 0 | Status: SHIPPED | OUTPATIENT
Start: 2017-09-27 | End: 2017-12-26 | Stop reason: SDUPTHER

## 2017-10-02 DIAGNOSIS — I10 ESSENTIAL HYPERTENSION: ICD-10-CM

## 2017-10-02 RX ORDER — ATENOLOL 25 MG/1
25 TABLET ORAL DAILY
Qty: 90 TAB | Refills: 3 | Status: SHIPPED | OUTPATIENT
Start: 2017-10-02 | End: 2018-01-10 | Stop reason: RX

## 2017-11-02 ENCOUNTER — HOSPITAL ENCOUNTER (OUTPATIENT)
Dept: LAB | Facility: MEDICAL CENTER | Age: 82
End: 2017-11-02
Attending: PHYSICIAN ASSISTANT
Payer: MEDICARE

## 2017-11-02 ENCOUNTER — HOSPITAL ENCOUNTER (OUTPATIENT)
Dept: RADIOLOGY | Facility: MEDICAL CENTER | Age: 82
End: 2017-11-02
Attending: PHYSICIAN ASSISTANT
Payer: MEDICARE

## 2017-11-02 ENCOUNTER — OFFICE VISIT (OUTPATIENT)
Dept: URGENT CARE | Facility: PHYSICIAN GROUP | Age: 82
End: 2017-11-02
Payer: MEDICARE

## 2017-11-02 VITALS
HEART RATE: 84 BPM | WEIGHT: 128 LBS | DIASTOLIC BLOOD PRESSURE: 78 MMHG | HEIGHT: 65 IN | BODY MASS INDEX: 21.33 KG/M2 | RESPIRATION RATE: 14 BRPM | TEMPERATURE: 97.5 F | SYSTOLIC BLOOD PRESSURE: 140 MMHG | OXYGEN SATURATION: 92 %

## 2017-11-02 DIAGNOSIS — R10.33 PERIUMBILICAL ABDOMINAL PAIN: ICD-10-CM

## 2017-11-02 DIAGNOSIS — N20.0 NEPHROLITHIASIS: ICD-10-CM

## 2017-11-02 DIAGNOSIS — R19.7 DIARRHEA, UNSPECIFIED TYPE: ICD-10-CM

## 2017-11-02 LAB
ALBUMIN SERPL BCP-MCNC: 4.4 G/DL (ref 3.2–4.9)
ALBUMIN/GLOB SERPL: 1.5 G/DL
ALP SERPL-CCNC: 67 U/L (ref 30–99)
ALT SERPL-CCNC: 10 U/L (ref 2–50)
ANION GAP SERPL CALC-SCNC: 8 MMOL/L (ref 0–11.9)
APPEARANCE UR: CLEAR
AST SERPL-CCNC: 20 U/L (ref 12–45)
BASOPHILS # BLD AUTO: 0.3 % (ref 0–1.8)
BASOPHILS # BLD: 0.03 K/UL (ref 0–0.12)
BILIRUB SERPL-MCNC: 0.7 MG/DL (ref 0.1–1.5)
BILIRUB UR STRIP-MCNC: NEGATIVE MG/DL
BUN SERPL-MCNC: 16 MG/DL (ref 8–22)
CALCIUM SERPL-MCNC: 10.3 MG/DL (ref 8.5–10.5)
CHLORIDE SERPL-SCNC: 102 MMOL/L (ref 96–112)
CO2 SERPL-SCNC: 30 MMOL/L (ref 20–33)
COLOR UR AUTO: YELLOW
CREAT SERPL-MCNC: 0.52 MG/DL (ref 0.5–1.4)
EOSINOPHIL # BLD AUTO: 0.11 K/UL (ref 0–0.51)
EOSINOPHIL NFR BLD: 1.2 % (ref 0–6.9)
ERYTHROCYTE [DISTWIDTH] IN BLOOD BY AUTOMATED COUNT: 47.4 FL (ref 35.9–50)
GFR SERPL CREATININE-BSD FRML MDRD: >60 ML/MIN/1.73 M 2
GLOBULIN SER CALC-MCNC: 3 G/DL (ref 1.9–3.5)
GLUCOSE SERPL-MCNC: 92 MG/DL (ref 65–99)
GLUCOSE UR STRIP.AUTO-MCNC: NEGATIVE MG/DL
HCT VFR BLD AUTO: 46.8 % (ref 37–47)
HGB BLD-MCNC: 14.7 G/DL (ref 12–16)
IMM GRANULOCYTES # BLD AUTO: 0.02 K/UL (ref 0–0.11)
IMM GRANULOCYTES NFR BLD AUTO: 0.2 % (ref 0–0.9)
KETONES UR STRIP.AUTO-MCNC: NEGATIVE MG/DL
LEUKOCYTE ESTERASE UR QL STRIP.AUTO: NEGATIVE
LIPASE SERPL-CCNC: 44 U/L (ref 11–82)
LYMPHOCYTES # BLD AUTO: 0.94 K/UL (ref 1–4.8)
LYMPHOCYTES NFR BLD: 10 % (ref 22–41)
MCH RBC QN AUTO: 30.1 PG (ref 27–33)
MCHC RBC AUTO-ENTMCNC: 31.4 G/DL (ref 33.6–35)
MCV RBC AUTO: 95.7 FL (ref 81.4–97.8)
MONOCYTES # BLD AUTO: 0.77 K/UL (ref 0–0.85)
MONOCYTES NFR BLD AUTO: 8.2 % (ref 0–13.4)
NEUTROPHILS # BLD AUTO: 7.49 K/UL (ref 2–7.15)
NEUTROPHILS NFR BLD: 80.1 % (ref 44–72)
NITRITE UR QL STRIP.AUTO: NEGATIVE
NRBC # BLD AUTO: 0 K/UL
NRBC BLD AUTO-RTO: 0 /100 WBC
PH UR STRIP.AUTO: 6 [PH] (ref 5–8)
PLATELET # BLD AUTO: 217 K/UL (ref 164–446)
PMV BLD AUTO: 10 FL (ref 9–12.9)
POTASSIUM SERPL-SCNC: 4.9 MMOL/L (ref 3.6–5.5)
PROT SERPL-MCNC: 7.4 G/DL (ref 6–8.2)
PROT UR QL STRIP: NEGATIVE MG/DL
RBC # BLD AUTO: 4.89 M/UL (ref 4.2–5.4)
RBC UR QL AUTO: NORMAL
SODIUM SERPL-SCNC: 140 MMOL/L (ref 135–145)
SP GR UR STRIP.AUTO: 1.02
UROBILINOGEN UR STRIP-MCNC: NEGATIVE MG/DL
WBC # BLD AUTO: 9.4 K/UL (ref 4.8–10.8)

## 2017-11-02 PROCEDURE — 85025 COMPLETE CBC W/AUTO DIFF WBC: CPT

## 2017-11-02 PROCEDURE — 81002 URINALYSIS NONAUTO W/O SCOPE: CPT | Performed by: PHYSICIAN ASSISTANT

## 2017-11-02 PROCEDURE — 99214 OFFICE O/P EST MOD 30 MIN: CPT | Performed by: PHYSICIAN ASSISTANT

## 2017-11-02 PROCEDURE — 83690 ASSAY OF LIPASE: CPT

## 2017-11-02 PROCEDURE — 80053 COMPREHEN METABOLIC PANEL: CPT

## 2017-11-02 PROCEDURE — 74176 CT ABD & PELVIS W/O CONTRAST: CPT

## 2017-11-02 PROCEDURE — 36415 COLL VENOUS BLD VENIPUNCTURE: CPT

## 2017-11-02 NOTE — LETTER
November 2, 2017         Patient: Katelyn Gil Destefani   YOB: 1935   Date of Visit: 11/2/2017         Diarrea   (Diarrhea)  La diarrea consiste en evacuaciones intestinales frecuentes, blandas o acuosas. Puede hacerlo sentir débil y deshidratado. La deshidratación puede hacer que se sienta cansado, sediento, tener la boca seca y que haya disminución de orina, que a menudo es de color amarillo oscuro. La diarrea es un signo de otro problema, generalmente vijaya infección que no durará mucho tiempo. En la mayoría de los casos, la diarrea dura típicamente 2 a 3 días. Sin embargo, puede durar más tiempo si se trata de un signo de algo más brannon. Es importante tratar la diarrea alondra lo indique finch médico para disminuir o prevenir futuros episodios de diarrea.   CAUSAS   Algunas causas comunes son:   · Infecciones gastrointestinales causadas por virus, bacterias o parásitos.  · Intoxicación alimentaria o alergias a los alimentos.  · Ciertos medicamentos, alondra los antibióticos, quimioterapia y laxantes.  · Edulcorantes artificiales y fructosa.  · Los trastornos digestivos.  INSTRUCCIONES PARA EL CUIDADO EN EL HOGAR   · Asegure vijaya adecuada ingesta de líquidos (hidratación). Evite los líquidos que contengan azúcares simples o las bebidas deportivas, los jugos de frutas, los productos derivados de la leche entera y las gaseosas. Si jazmyn la cantidad suficiente de líquidos, la orina debe ser marta o amarillo pálido. Vijaya solución de rehidratación oral se puede comprar en las farmacias, en las tiendas minoristas y por Internet. Se puede preparar vijaya solución de rehidratación oral casera con los siguientes ingredientes:  ¨  - cucharadita de sal.  ¨ ¾ cucharadita de bicarbonato.  ¨  de cucharadita de sal sustituta que contenga cloruro de potasio.  ¨ 1  cucharada de azúcar.  ¨ 1l (34 onzas) de agua.  · Ciertos alimentos y bebidas pueden aumentar la velocidad a la que el alimento se mueve a través del tracto  gastrointestinal (GI). Estos alimentos y bebidas deben evitarse e incluyen:  ¨ Bebidas alcohólicas y con cafeína.  ¨ Alimentos ricos en fibra, alondra frutas y verduras, nueces, semillas, panes y cereales integrales.  ¨ Alimentos y bebidas endulzados con alcoholes de azúcar, tales alondra xilitol, sorbitol, y manitol.  · Algunos alimentos pueden ser chicho tolerados y puede ayudar a espesar las heces, incluyendo:  ¨ Alimentos con almidón, alondra arroz, pan, pasta, cereales bajos en azúcar, ilir, sémola de maíz, issac al horno, galletas y panecillos.  ¨ Bananas.  ¨ Puré de manzana.  · Agregue alimentos ricos en probióticos a la dieta del stuart para ayudar a aumentar las bacterias saludables en el tracto gastrointestinal, alondra el yogur y productos lácteos fermentados.  · Lávese chicho las jose después de cada episodio de diarrea.  · [place] sólo medicamentos de venta jayde o recetados, según las indicaciones del médico.  · [place] un baño caliente para ayudar a disminuir ardor o dolor por los episodios frecuentes de diarrea.  SOLICITE ATENCIÓN MÉDICA DE INMEDIATO SI:   · No puede retener los líquidos.  · Tiene vómitos persistentes.  · Observa art en la materia fecal, o las heces son negras y de aspecto alquitranado.  · No hay emisión de orina divya 6 a 8 horas o elimina vijaya pequeña cantidad de orina muy oscura.  · Tiene dolor abdominal que aumenta o se localiza.  · Está muy mareado o se desvanece.  · Sufre un dolor intenso de ben.  · La diarrea empeora o no mejora.  · Tiene fiebre o síntomas que persisten divya más de 2 o 3 kayden.  · Tiene fiebre y los síntomas empeoran de manera súbita.  ASEGÚRESE DE QUE:   · Comprende estas instrucciones.  · Controlará finch enfermedad.  · Solicitará ayuda de inmediato si no mejora o si empeora.     Esta información no tiene alondra fin reemplazar el consejo del médico. Asegúrese de hacerle al médico cualquier pregunta que tenga.     Document Released: 12/18/2006 Document Revised:  12/04/2013  [employer] Interactive Patient Education ©2016 [employer]  Diarrhea  Diarrhea is frequent loose and watery bowel movements. It can cause you to feel weak and dehydrated. Dehydration can cause you to become tired and thirsty, have a dry mouth, and have decreased urination that often is dark yellow. Diarrhea is a sign of another problem, most often an infection that will not last long. In most cases, diarrhea typically lasts 2-3 days. However, it can last longer if it is a sign of something more serious. It is important to treat your diarrhea as directed by your caregiver to lessen or prevent future episodes of diarrhea.  CAUSES   Some common causes include:  · Gastrointestinal infections caused by viruses, bacteria, or parasites.  · Food poisoning or food allergies.  · Certain medicines, such as antibiotics, chemotherapy, and laxatives.  · Artificial sweeteners and fructose.  · Digestive disorders.  HOME CARE INSTRUCTIONS  · Ensure adequate fluid intake (hydration): Have 1 cup (8 oz) of fluid for each diarrhea episode. Avoid fluids that contain simple sugars or sports drinks, fruit juices, whole milk products, and sodas. Your urine should be clear or pale yellow if you are drinking enough fluids. Hydrate with an oral rehydration solution that you can purchase at pharmacies, retail stores, and online. You can prepare an oral rehydration solution at home by mixing the following ingredients together:  ¨  - tsp table salt.  ¨ ¾ tsp baking soda.  ¨  tsp salt substitute containing potassium chloride.  ¨ 1  tablespoons sugar.  ¨ 1 L (34 oz) of water.  · Certain foods and beverages may increase the speed at which food moves through the gastrointestinal (GI) tract. These foods and beverages should be avoided and include:  ¨ Caffeinated and alcoholic beverages.  ¨ High-fiber foods, such as raw fruits and vegetables, nuts, seeds, and whole grain breads and cereals.  ¨ Foods and beverages sweetened with sugar  alcohols, such as xylitol, sorbitol, and mannitol.  · Some foods may be well tolerated and may help thicken stool including:  ¨ Starchy foods, such as rice, toast, pasta, low-sugar cereal, oatmeal, grits, baked potatoes, crackers, and bagels.  ¨ Bananas.  ¨ Applesauce.  · Add probiotic-rich foods to help increase healthy bacteria in the GI tract, such as yogurt and fermented milk products.  · Wash your hands well after each diarrhea episode.  · Only take over-the-counter or prescription medicines as directed by your caregiver.  · Take a warm bath to relieve any burning or pain from frequent diarrhea episodes.  SEEK IMMEDIATE MEDICAL CARE IF:   · You are unable to keep fluids down.  · You have persistent vomiting.  · You have blood in your stool, or your stools are black and tarry.  · You do not urinate in 6-8 hours, or there is only a small amount of very dark urine.  · You have abdominal pain that increases or localizes.  · You have weakness, dizziness, confusion, or light-headedness.  · You have a severe headache.  · Your diarrhea gets worse or does not get better.  · You have a fever or persistent symptoms for more than 2-3 days.  · You have a fever and your symptoms suddenly get worse.  MAKE SURE YOU:   · Understand these instructions.  · Will watch your condition.  · Will get help right away if you are not doing well or get worse.     This information is not intended to replace advice given to you by your health care provider. Make sure you discuss any questions you have with your health care provider.     Document Released: 12/08/2003 Document Revised: 01/08/2016 Document Reviewed: 08/25/2013  [employer] Interactive Patient Education ©2016 [employer]          Rosana Mejia P.A.-C.  Electronically Signed

## 2017-11-03 ASSESSMENT — ENCOUNTER SYMPTOMS
BELCHING: 0
NAUSEA: 0
CONSTIPATION: 0
VOMITING: 0
MUSCULOSKELETAL NEGATIVE: 1
SHORTNESS OF BREATH: 0
DIARRHEA: 1
ABDOMINAL PAIN: 1
DIZZINESS: 0
BLOOD IN STOOL: 0
HEADACHES: 0
FEVER: 0
CHILLS: 0

## 2017-11-03 ASSESSMENT — CROHNS DISEASE ACTIVITY INDEX (CDAI): CDAI SCORE: 0

## 2017-11-03 NOTE — PROGRESS NOTES
"Subjective:      Katelyn Hill is a 82 y.o. female who presents with Abdominal Pain (abdominal pain diarrhea )            Abdominal Pain   This is a new problem. The current episode started today. The onset quality is gradual. The problem occurs constantly. The problem has been unchanged. The pain is located in the periumbilical region. The pain is at a severity of 5/10. The pain is moderate. The quality of the pain is colicky. The abdominal pain does not radiate. Associated symptoms include diarrhea. Pertinent negatives include no belching, constipation, dysuria, fever, frequency, headaches, hematuria, nausea or vomiting. The pain is aggravated by bowel movement. The pain is relieved by being still. Prior diagnostic workup includes CT scan. Her past medical history is significant for abdominal surgery. There is no history of Crohn's disease, irritable bowel syndrome or PUD.     Patient presents to urgent care reporting diarrhea starting this morning with associated periumbilical abdominal pain. She denies history of any known colon disease. She reports the diarrhea is watery, no blood or mucous noted. She denies any suspect food intake, recent travel, recent hospitalization, or history of c diff infections.     Review of Systems   Constitutional: Negative for chills and fever.   Respiratory: Negative for shortness of breath.    Cardiovascular: Negative for chest pain.   Gastrointestinal: Positive for abdominal pain and diarrhea. Negative for blood in stool, constipation, nausea and vomiting.   Genitourinary: Negative.  Negative for dysuria, frequency and hematuria.   Musculoskeletal: Negative.    Neurological: Negative for dizziness and headaches.          Objective:     /78   Pulse 84   Temp 36.4 °C (97.5 °F)   Resp 14   Ht 1.651 m (5' 5\")   Wt 58.1 kg (128 lb)   LMP 05/01/1985   SpO2 92%   BMI 21.30 kg/m²      Physical Exam   Constitutional: She is oriented to person, place, and time. " She appears well-developed and well-nourished. No distress.   HENT:   Head: Normocephalic and atraumatic.   Eyes: Pupils are equal, round, and reactive to light.   Neck: Normal range of motion.   Cardiovascular: Normal rate.    Pulmonary/Chest: Effort normal.   Abdominal: Soft. Normal appearance and bowel sounds are normal. She exhibits no distension. There is tenderness in the periumbilical area. There is no tenderness at McBurney's point and negative Keys's sign.   No CVAT bilaterally   Musculoskeletal: Normal range of motion.   Neurological: She is alert and oriented to person, place, and time.   Skin: Skin is warm and dry. She is not diaphoretic.   Psychiatric: She has a normal mood and affect. Her behavior is normal.   Nursing note and vitals reviewed.            POCT Urinalysis:  Component Results     Component Value Ref Range & Units Status   POC Color Yellow Negative Final   POC Appearance Clear Negative Final   POC Leukocyte Esterase Negative Negative Final   POC Nitrites Negative Negative Final   POC Urobiligen Negative Negative (0.2) mg/dL Final   POC Protein Negative Negative mg/dL Final   POC Urine PH 6.0 5.0 - 8.0 Final   POC Blood SM Negative Final   POC Specific Gravity 1.025 <1.005 - >1.030 Final   POC Ketones Negative Negative mg/dL Final   POC Biliruben Negative Negative mg/dL Final   POC Glucose Negative Negative mg/dL Final   Last Resulted Time   Thu Nov 2, 2017  9:38 AM       Assessment/Plan:     1. Diarrhea, unspecified type    Advised patient to start taking OTC imodium and probiotics daily for diarrhea. Handout given for diet to follow while dealing with diarrhea. Increased fluids to prevent dehydration. Call or return to office if symptoms persist or worsen. The patient demonstrated a good understanding and agreed with the treatment plan.    2. Periumbilical abdominal pain  - POCT Urinalysis --> small blood, otherwise normal  - CBC WITH DIFFERENTIAL; Future  - COMP METABOLIC PANEL;  Future  - LIPASE; Future  - CT-ABDOMEN-PELVIS W/O; Future  Impression       1.  No acute abnormality within the abdomen or pelvis    2.  Minimal atelectasis    3.  Hepatic calcification consistent with granuloma    4.  Aortic atherosclerotic plaque    5.  6 mm nonobstructive right renal calculus       3. Nephrolithiasis  - REFERRAL TO UROLOGY

## 2017-11-03 NOTE — PATIENT INSTRUCTIONS
Diarrea   (Diarrhea)  La diarrea consiste en evacuaciones intestinales frecuentes, blandas o acuosas. Puede hacerlo sentir débil y deshidratado. La deshidratación puede hacer que se sienta cansado, sediento, tener la boca seca y que haya disminución de orina, que a menudo es de color amarillo oscuro. La diarrea es un signo de otro problema, generalmente vijaya infección que no durará mucho tiempo. En la mayoría de los casos, la diarrea dura típicamente 2 a 3 días. Sin embargo, puede durar más tiempo si se trata de un signo de algo más brannon. Es importante tratar la diarrea alondra lo indique finch médico para disminuir o prevenir futuros episodios de diarrea.   CAUSAS   Algunas causas comunes son:   · Infecciones gastrointestinales causadas por virus, bacterias o parásitos.  · Intoxicación alimentaria o alergias a los alimentos.  · Ciertos medicamentos, alondra los antibióticos, quimioterapia y laxantes.  · Edulcorantes artificiales y fructosa.  · Los trastornos digestivos.  INSTRUCCIONES PARA EL CUIDADO EN EL HOGAR   · Asegure vijaya adecuada ingesta de líquidos (hidratación). Evite los líquidos que contengan azúcares simples o las bebidas deportivas, los jugos de frutas, los productos derivados de la leche entera y las gaseosas. Si jazmyn la cantidad suficiente de líquidos, la orina debe ser marta o amarillo pálido. Vijaya solución de rehidratación oral se puede comprar en las farmacias, en las tiendas minoristas y por Internet. Se puede preparar vijaya solución de rehidratación oral casera con los siguientes ingredientes:  ¨  - cucharadita de sal.  ¨ ¾ cucharadita de bicarbonato.  ¨  de cucharadita de sal sustituta que contenga cloruro de potasio.  ¨ 1  cucharada de azúcar.  ¨ 1l (34 onzas) de agua.  · Ciertos alimentos y bebidas pueden aumentar la velocidad a la que el alimento se mueve a través del tracto gastrointestinal (GI). Estos alimentos y bebidas deben evitarse e incluyen:  ¨ Bebidas alcohólicas y con cafeína.  ¨ Alimentos  ricos en fibra, alondra frutas y verduras, nueces, semillas, panes y cereales integrales.  ¨ Alimentos y bebidas endulzados con alcoholes de azúcar, tales alondra xilitol, sorbitol, y manitol.  · Algunos alimentos pueden ser chicho tolerados y puede ayudar a espesar las heces, incluyendo:  ¨ Alimentos con almidón, alondra arroz, pan, pasta, cereales bajos en azúcar, ilir, sémola de maíz, issac al horno, galletas y panecillos.  ¨ Bananas.  ¨ Puré de manzana.  · Agregue alimentos ricos en probióticos a la dieta del stuart para ayudar a aumentar las bacterias saludables en el tracto gastrointestinal, alondra el yogur y productos lácteos fermentados.  · Lávese chicho las jose después de cada episodio de diarrea.  · La Rue sólo medicamentos de venta jayde o recetados, según las indicaciones del médico.  · La Rue un baño caliente para ayudar a disminuir ardor o dolor por los episodios frecuentes de diarrea.  SOLICITE ATENCIÓN MÉDICA DE INMEDIATO SI:   · No puede retener los líquidos.  · Tiene vómitos persistentes.  · Observa art en la materia fecal, o las heces son negras y de aspecto alquitranado.  · No hay emisión de orina divya 6 a 8 horas o elimina vijaya pequeña cantidad de orina muy oscura.  · Tiene dolor abdominal que aumenta o se localiza.  · Está muy mareado o se desvanece.  · Sufre un dolor intenso de ben.  · La diarrea empeora o no mejora.  · Tiene fiebre o síntomas que persisten divya más de 2 o 3 kayden.  · Tiene fiebre y los síntomas empeoran de manera súbita.  ASEGÚRESE DE QUE:   · Comprende estas instrucciones.  · Controlará finch enfermedad.  · Solicitará ayuda de inmediato si no mejora o si empeora.     Esta información no tiene alondra fin reemplazar el consejo del médico. Asegúrese de hacerle al médico cualquier pregunta que tenga.     Document Released: 12/18/2006 Document Revised: 12/04/2013  Elsevier Interactive Patient Education ©2016 [employer]  Diarrhea  Diarrhea is frequent loose and watery bowel movements. It  can cause you to feel weak and dehydrated. Dehydration can cause you to become tired and thirsty, have a dry mouth, and have decreased urination that often is dark yellow. Diarrhea is a sign of another problem, most often an infection that will not last long. In most cases, diarrhea typically lasts 2-3 days. However, it can last longer if it is a sign of something more serious. It is important to treat your diarrhea as directed by your caregiver to lessen or prevent future episodes of diarrhea.  CAUSES   Some common causes include:  · Gastrointestinal infections caused by viruses, bacteria, or parasites.  · Food poisoning or food allergies.  · Certain medicines, such as antibiotics, chemotherapy, and laxatives.  · Artificial sweeteners and fructose.  · Digestive disorders.  HOME CARE INSTRUCTIONS  · Ensure adequate fluid intake (hydration): Have 1 cup (8 oz) of fluid for each diarrhea episode. Avoid fluids that contain simple sugars or sports drinks, fruit juices, whole milk products, and sodas. Your urine should be clear or pale yellow if you are drinking enough fluids. Hydrate with an oral rehydration solution that you can purchase at pharmacies, retail stores, and online. You can prepare an oral rehydration solution at home by mixing the following ingredients together:  ¨  - tsp table salt.  ¨ ¾ tsp baking soda.  ¨  tsp salt substitute containing potassium chloride.  ¨ 1  tablespoons sugar.  ¨ 1 L (34 oz) of water.  · Certain foods and beverages may increase the speed at which food moves through the gastrointestinal (GI) tract. These foods and beverages should be avoided and include:  ¨ Caffeinated and alcoholic beverages.  ¨ High-fiber foods, such as raw fruits and vegetables, nuts, seeds, and whole grain breads and cereals.  ¨ Foods and beverages sweetened with sugar alcohols, such as xylitol, sorbitol, and mannitol.  · Some foods may be well tolerated and may help thicken stool including:  ¨ Starchy foods,  such as rice, toast, pasta, low-sugar cereal, oatmeal, grits, baked potatoes, crackers, and bagels.  ¨ Bananas.  ¨ Applesauce.  · Add probiotic-rich foods to help increase healthy bacteria in the GI tract, such as yogurt and fermented milk products.  · Wash your hands well after each diarrhea episode.  · Only take over-the-counter or prescription medicines as directed by your caregiver.  · Take a warm bath to relieve any burning or pain from frequent diarrhea episodes.  SEEK IMMEDIATE MEDICAL CARE IF:   · You are unable to keep fluids down.  · You have persistent vomiting.  · You have blood in your stool, or your stools are black and tarry.  · You do not urinate in 6-8 hours, or there is only a small amount of very dark urine.  · You have abdominal pain that increases or localizes.  · You have weakness, dizziness, confusion, or light-headedness.  · You have a severe headache.  · Your diarrhea gets worse or does not get better.  · You have a fever or persistent symptoms for more than 2-3 days.  · You have a fever and your symptoms suddenly get worse.  MAKE SURE YOU:   · Understand these instructions.  · Will watch your condition.  · Will get help right away if you are not doing well or get worse.     This information is not intended to replace advice given to you by your health care provider. Make sure you discuss any questions you have with your health care provider.     Document Released: 12/08/2003 Document Revised: 01/08/2016 Document Reviewed: 08/25/2013  [employer] Interactive Patient Education ©2016 Elsevier Inc.

## 2017-12-26 DIAGNOSIS — I10 ESSENTIAL HYPERTENSION: ICD-10-CM

## 2017-12-27 RX ORDER — LISINOPRIL 5 MG/1
TABLET ORAL
Qty: 90 TAB | Refills: 0 | Status: SHIPPED | OUTPATIENT
Start: 2017-12-27 | End: 2018-03-29 | Stop reason: SDUPTHER

## 2018-01-10 ENCOUNTER — TELEPHONE (OUTPATIENT)
Dept: MEDICAL GROUP | Facility: PHYSICIAN GROUP | Age: 83
End: 2018-01-10

## 2018-01-10 DIAGNOSIS — I10 ESSENTIAL HYPERTENSION: ICD-10-CM

## 2018-01-10 RX ORDER — METOPROLOL SUCCINATE 25 MG/1
25 TABLET, EXTENDED RELEASE ORAL DAILY
Qty: 90 TAB | Refills: 3 | Status: SHIPPED | OUTPATIENT
Start: 2018-01-10 | End: 2018-04-11 | Stop reason: SDUPTHER

## 2018-01-10 NOTE — TELEPHONE ENCOUNTER
[facility] faxed a request to change medication as tenormin is not available. Request attached in media. Please advise. TW

## 2018-01-11 NOTE — TELEPHONE ENCOUNTER
Phone Number Called: [phone] (home)       Message: Pt informed of change in medication. Pt had no questions. TW    Left Message for patient to call back: N\A

## 2018-03-12 ENCOUNTER — PATIENT OUTREACH (OUTPATIENT)
Dept: HEALTH INFORMATION MANAGEMENT | Facility: OTHER | Age: 83
End: 2018-03-12

## 2018-03-12 NOTE — PROGRESS NOTES
1. Attempt #: 1    2. [facility] Verified: yes    3. Verify PCP: yes    4. Care Team Updated:       •   DME Company (gait device, O2, CPAP, etc.): YES       •   Other Specialists (eye doctor, derm, GYN, cardiology, endo, etc): YES    5.  Reviewed/Updated the following with patient:       •   Communication Preference Obtained? YES       •   Preferred Pharmacy? YES       •   Preferred Lab? YES       •   Family History (document living status of immediate family members and if + hx of cancer, diabetes, hypertension, hyperlipidemia, heart attack, stroke) NO    6. [employer] Activation: sent activation code    7. [employer] Feliciano: no    8. Annual Wellness Visit Scheduling  Scheduling Status:Scheduled      9. Care Gap Scheduling (Attempt to Schedule EACH Overdue Care Gap!)     Health Maintenance Due   Topic Date Due   • IMM DTaP/Tdap/Td Vaccine (1 - Tdap) 04/06/1954   • IMM ZOSTER VACCINE  04/06/1995   • Annual Wellness Visit  05/20/2015   • COLON CANCER SCREENING ANNUAL FIT  06/30/2016        Scheduled patient for Annual Wellness Visit    10. Patient was advised: “This is a free wellness visit. The provider will screen for medical conditions to help you stay healthy. If you have other concerns to address you may be asked to discuss these at a separate visit or there may be an additional fee.”     11. Patient was informed to arrive 15 min prior to their scheduled appointment and bring in their medication bottles.

## 2018-03-29 DIAGNOSIS — I10 ESSENTIAL HYPERTENSION: ICD-10-CM

## 2018-03-29 RX ORDER — LISINOPRIL 5 MG/1
5 TABLET ORAL
Qty: 90 TAB | Refills: 0 | Status: SHIPPED | OUTPATIENT
Start: 2018-03-29 | End: 2018-04-11 | Stop reason: SDUPTHER

## 2018-04-03 ENCOUNTER — TELEPHONE (OUTPATIENT)
Dept: MEDICAL GROUP | Facility: PHYSICIAN GROUP | Age: 83
End: 2018-04-03

## 2018-04-03 NOTE — TELEPHONE ENCOUNTER
Future Appointments       Provider Department Center    4/11/2018 2:20 PM Monica Rodriguez M.D.; [facility]  [facility]    4/18/2018 10:40 AM Monica Rodriguez M.D. [facility]        ANNUAL WELLNESS VISIT PRE-VISIT PLANNING WITHOUT OUTREACH    1.  Reviewed note from last office visit with PCP: YES05/19/2017    2.  If any orders were placed at last visit, do we have Results/Consult Notes?        •  Labs - Labs ordered, completed on 11/02/2017 and results are in chart..       •  Imaging - Imaging ordered, completed and results are in chart.       •  Referrals - Referral ordered, patient has NOT been seen.    3.  Immunizations were updated in [employer] using [employer]?: Yes       •  Web[employer] Recommendations: TDAP and ZOSTAVAX (Shingles)        •  Is patient due for Tdap? YES. Patient was notified of copay/out of pocket cost. Declined        •  Is patient due for Shingles? YES. Patient was notified of copay/out of pocket cost. Declined     4.  Patient is due for the following Health Maintenance Topics:   Health Maintenance Due   Topic Date Due   • IMM DTaP/Tdap/Td Vaccine (1 - Tdap) 04/06/1954   • IMM ZOSTER VACCINE  04/06/1995   • Annual Wellness Visit  05/20/2015   • COLON CANCER SCREENING ANNUAL FIT  06/30/2016           5.  Reviewed/Updated the following with patient:       •   Preferred Pharmacy? YES       •   Preferred Lab? YES       •   Preferred Communication? YES       •   Allergies? YES       •   Medications? YES. Was Abstract Encounter opened and chart updated? YES       •   Social History? YES. Was Abstract Encounter opened and chart updated? YES       •   Family History (document living status of immediate family members and if + hx of  cancer, diabetes, hypertension, hyperlipidemia, heart attack, stroke) YES. Was Abstract Encounter opened and chart updated? YES    6.  Care Team Updated:       •   DME Company (gait device, O2, CPAP, etc.): YES  [employer]        •   Other Specialists (eye doctor, derm, GYN, cardiology, endo, etc): YES    7.  Patient has the following Care Path diagnoses on Problem List:  NONE    8.  MDX printed and highlighted for Provider? YES    9.  Patient was advised: “This is a free wellness visit. The provider will screen for medical conditions to help you stay healthy. If you have other concerns to address you may be asked to discuss these at a separate visit or there may be an additional fee.”     10.  Patient was informed to arrive 15 min prior to their scheduled appointment and bring in their medication bottles. [facility]

## 2018-04-11 ENCOUNTER — HOSPITAL ENCOUNTER (OUTPATIENT)
Facility: MEDICAL CENTER | Age: 83
End: 2018-04-11
Attending: INTERNAL MEDICINE
Payer: MEDICARE

## 2018-04-11 ENCOUNTER — OFFICE VISIT (OUTPATIENT)
Dept: MEDICAL GROUP | Facility: PHYSICIAN GROUP | Age: 83
End: 2018-04-11
Payer: MEDICARE

## 2018-04-11 VITALS
SYSTOLIC BLOOD PRESSURE: 160 MMHG | BODY MASS INDEX: 20.83 KG/M2 | TEMPERATURE: 97.9 F | HEART RATE: 60 BPM | OXYGEN SATURATION: 96 % | DIASTOLIC BLOOD PRESSURE: 80 MMHG | HEIGHT: 65 IN | WEIGHT: 125 LBS

## 2018-04-11 DIAGNOSIS — G89.29 CHRONIC RIGHT-SIDED LOW BACK PAIN WITH RIGHT-SIDED SCIATICA: ICD-10-CM

## 2018-04-11 DIAGNOSIS — R91.8 LUNG NODULES: ICD-10-CM

## 2018-04-11 DIAGNOSIS — I10 ESSENTIAL HYPERTENSION: ICD-10-CM

## 2018-04-11 DIAGNOSIS — Z12.11 COLON CANCER SCREENING: ICD-10-CM

## 2018-04-11 DIAGNOSIS — M16.11 OSTEOARTHRITIS OF RIGHT HIP, UNSPECIFIED OSTEOARTHRITIS TYPE: ICD-10-CM

## 2018-04-11 DIAGNOSIS — M54.41 CHRONIC RIGHT-SIDED LOW BACK PAIN WITH RIGHT-SIDED SCIATICA: ICD-10-CM

## 2018-04-11 DIAGNOSIS — R32 URINARY INCONTINENCE, UNSPECIFIED TYPE: ICD-10-CM

## 2018-04-11 PROBLEM — Z00.00 HEALTHCARE MAINTENANCE: Status: ACTIVE | Noted: 2018-04-11

## 2018-04-11 PROCEDURE — 82274 ASSAY TEST FOR BLOOD FECAL: CPT

## 2018-04-11 PROCEDURE — G0439 PPPS, SUBSEQ VISIT: HCPCS | Performed by: INTERNAL MEDICINE

## 2018-04-11 RX ORDER — OXYBUTYNIN CHLORIDE 10 MG/1
10 TABLET, EXTENDED RELEASE ORAL DAILY
Qty: 90 TAB | Refills: 0 | Status: SHIPPED | OUTPATIENT
Start: 2018-04-11 | End: 2018-09-05 | Stop reason: SDUPTHER

## 2018-04-11 RX ORDER — COVID-19 ANTIGEN TEST
1 KIT MISCELLANEOUS
Qty: 30 CAP | Refills: 0
Start: 2018-04-11 | End: 2019-01-01 | Stop reason: CLARIF

## 2018-04-11 RX ORDER — LISINOPRIL 5 MG/1
5 TABLET ORAL
Qty: 90 TAB | Refills: 2 | Status: SHIPPED | OUTPATIENT
Start: 2018-04-11 | End: 2018-04-18 | Stop reason: SDUPTHER

## 2018-04-11 RX ORDER — METOPROLOL SUCCINATE 25 MG/1
25 TABLET, EXTENDED RELEASE ORAL DAILY
Qty: 90 TAB | Refills: 3 | Status: SHIPPED | OUTPATIENT
Start: 2018-04-11 | End: 2019-01-01 | Stop reason: SDUPTHER

## 2018-04-11 RX ORDER — MIRABEGRON 50 MG/1
50 TABLET, FILM COATED, EXTENDED RELEASE ORAL
COMMUNITY
Start: 2018-02-02 | End: 2018-04-11

## 2018-04-11 ASSESSMENT — ACTIVITIES OF DAILY LIVING (ADL): BATHING_REQUIRES_ASSISTANCE: 0

## 2018-04-11 ASSESSMENT — PATIENT HEALTH QUESTIONNAIRE - PHQ9: CLINICAL INTERPRETATION OF PHQ2 SCORE: 0

## 2018-04-11 NOTE — ASSESSMENT & PLAN NOTE
This is a chronic health problem that is uncontrolled with current medications and lifestyle measures. Blood pressure in the today for 160/86, patient is currently on medications lisinopril 5 mg daily and metoprolol tartrate 25 mg daily. Requesting for refills of the medication. We will recheck the blood pressure before the patient leaves. Denies any symptoms at this time.

## 2018-04-11 NOTE — PROGRESS NOTES
Chief Complaint   Patient presents with   • Annual Wellness Visit         HPI:  Katelyn is a 83 y.o. here for Medicare Annual Wellness Visit. Patient is also new to me so I have reviewed few of her medical problems but she'll have a new patient visit with me on April 18, 2018.    Healthcare maintenance  Declines DTaP which is due at this time, okay for fit test. Had a DEXA scan done in 2011 which was normal at that time denied for any repeat DEXA scan at this time.    HTN (hypertension)  This is a chronic health problem that is uncontrolled with current medications and lifestyle measures. Blood pressure in the today for 160/86, patient is currently on medications lisinopril 5 mg daily and metoprolol tartrate 25 mg daily. Requesting for refills of the medication. We will recheck the blood pressure before the patient leaves. Denies any symptoms at this time.    Lung nodules  This is a chronic health problem that is well controlled with current medications and lifestyle measures. Patient had a CT chest done in 2014 and repeat done in 2017 both of which are showing stable left lower lobe nodule at 8 mm and 7 mm respectively. This is most likely benign.    Chronic right-sided low back pain with right-sided sciatica  This is a chronic health problem that is well controlled with current medications and lifestyle measures. Intermittent right lower back pain with radiation to right lower extremity. Per  patient has responded to daily lower back extension exercises as instructed in the past but currently stopped doing it anymore and is on when necessary Aleve which is giving her relief very rare once in 6 months.    Urinary incontinence  This is a chronic health problem that is well controlled with current medications and lifestyle measures. Patient is currently on medication myrbetriq 50 mg daily which she stopped taking because it doesn't work anymore after she used for one year. Request for an alternative.    OA  (Osteoarthritis)  This is a chronic health problem that is well controlled with current medications and lifestyle measures mostly on the right hip, takes when necessary Aleve occasionally when she needs.    Dyslipidemia  This is a chronic health problem that is well controlled with current medications and lifestyle measures. She was on cholesterol medications in the past but given her continuous blood work showing normal lipid panel from 2012 to 2015 all her medications were discontinued.      Patient Active Problem List    Diagnosis Date Noted   • Healthcare maintenance 04/11/2018   • Chronic right-sided low back pain with right-sided sciatica 04/11/2018   • Urinary incontinence 04/11/2018   • Head ache 04/28/2015   • Chest pain 04/28/2015   • Lung nodules    • Torn rotator cuff 07/02/2012   • HTN (hypertension) 05/13/2010   • Dyslipidemia    • OA (osteoarthritis)    • Lactose intolerance        Current Outpatient Prescriptions   Medication Sig Dispense Refill   • Naproxen Sodium (ALEVE) 220 MG Cap Take 1 Cap by mouth 1 time daily as needed. 30 Cap 0   • lisinopril (PRINIVIL) 5 MG Tab Take 1 Tab by mouth every day. 90 Tab 0   • metoprolol SR (TOPROL XL) 25 MG TABLET SR 24 HR Take 1 Tab by mouth every day. 90 Tab 3     No current facility-administered medications for this visit.         Patient is taking medications as noted in medication list.  Current supplements as per medication list.     Allergies: Iodine; Aspirin; Clindamycin; Penicillins; Robitussin [guiatuss]; Shellfish allergy; Tetanus toxoid; and Zithromax [azithromycin dihydrate]    Current social contact/activities: Patient lives with her two sons in [place] and appeciate very good neighbour accompanying the patient.  Patient's perception of their health: good    Is patient current with immunizations? No, due for TDAP. Patient is interested in receiving NONE today.    She  reports that she quit smoking about 33 years ago. Her smoking use included  Cigarettes. She has a 30.00 pack-year smoking history. She has never used smokeless tobacco. She reports that she does not drink alcohol or use drugs.  Counseling given: Yes        DPA/Advanced directive: Patient has Living Will on file.     ROS:    Gait: Uses a cane   Ostomy: No   Other tubes: No   Amputations: No   Chronic oxygen use: No   Last eye exam: 1 year ago   Wears hearing aids: No   : Reports urinary leakage during the last 6 months that has not interfered at all with their daily activities or sleep.    Depression Screening  Little interest or pleasure in doing things?  0 - not at all  Feeling down, depressed, or hopeless? 0 - not at all  Patient Health Questionnaire Score: 0    If depressive symptoms identified deferred to follow up visit unless specifically addressed in assessment and plan.    Interpretation of PHQ-9 Total Score   Score Severity   1-4 No Depression   5-9 Mild Depression   10-14 Moderate Depression   15-19 Moderately Severe Depression   20-27 Severe Depression    Screening for Cognitive Impairment  Three Minute Recall   2/3 Village, kitchen, baby   Draw clock face with all 12 numbers set to the hand to show   1 3:45 4/5  If cognitive concerns identified, deferred for follow up unless specifically addressed in assessment and plan.    Fall Risk Assessment  Has the patient had two or more falls in the last year or any fall with injury in the last year?  No  If fall risk identified, deferred for follow up unless specifically addressed in assessment and plan.    Safety Assessment  Throw rugs on floor.  No  Handrails on all stairs.  Yes  Good lighting in all hallways.  Yes  Difficulty hearing.  No  Patient counseled about all safety risks that were identified.    Functional Assessment ADLs  Are there any barriers preventing you from cooking for yourself or meeting nutritional needs?  No.    Are there any barriers preventing you from driving safely or obtaining transportation?  No.    Are  there any barriers preventing you from using a telephone or calling for help?  No.    Are there any barriers preventing you from shopping?  No.    Are there any barriers preventing you from taking care of your own finances?  No.    Are there any barriers preventing you from managing your medications?  No.    Are there any barriers preventing you from showering, bathing or dressing yourself?  No.    Are you currently engaging any exercise or physical activity?  No.       Health Maintenance Summary                IMM DTaP/Tdap/Td Vaccine Overdue 4/6/1954     Annual Wellness Visit Overdue 5/20/2015      Done 5/19/2014     COLON CANCER SCREENING ANNUAL FIT Overdue 6/30/2016      Done 6/30/2015 OCCULT BLOOD FECES IMMUNOASSAY     Patient has more history with this topic...    BONE DENSITY Next Due 9/12/2021      Done 9/12/2011 DS-BONE DENSITY STUDY (DEXA)     Patient has more history with this topic...          Patient Care Team:  Asif Owusu M.D. as PCP - General (Internal Medicine)  Monica Rodriguez M.D. (Internal Medicine)    Social History   Substance Use Topics   • Smoking status: Former Smoker     Packs/day: 1.00     Years: 30.00     Types: Cigarettes     Quit date: 1/1/1985   • Smokeless tobacco: Never Used      Comment: 1 ppd for 30 years, [id number]   • Alcohol use No     Family History   Problem Relation Age of Onset   • Heart Disease Mother    • Heart Disease Father      enlarged heart   • Diabetes Son    • Cancer Maternal Grandmother      suicide    • Cancer Maternal Grandfather    • Cancer Paternal Grandmother      Lung cancer      She  has a past medical history of Dyslipidemia; Hypertension; Kidney calculi; Lactose intolerance; Lung nodules; MEDICAL HOME; OA (osteoarthritis); Raynaud's disease /phenomenon; and Torn rotator cuff (2008). She also has no past medical history of Breast cancer (CMS-HCC) or Hypercholesteremia.   Past Surgical History:   Procedure Laterality Date   • APPENDECTOMY  1943   • KNEE  "ARTHROSCOPY      right   • SHOULDER ARTHROSCOPY      right   • TONSILLECTOMY         Hearing good.    Dentition poor  Alert, oriented in no acute distress.  Eye contact is good, speech goal directed, affect calm    PHYSICAL EXAM  VITAL SIGNS:   /86   Pulse 60   Temp 36.6 °C (97.9 °F)   Ht 1.651 m (5' 5\")   Wt 56.7 kg (125 lb)   LMP 05/01/1985   SpO2 96%   BMI 20.80 kg/m²   Constitutional: Well developed, Well nourished, No acute distress, Non-toxic appearance.    HENT: Normocephalic, Atraumatic, Bilateral external ears normal, Oropharynx moist, No oral exudates, Nose normal.    Eyes: PERRLA, EOMI, Conjunctiva normal, No discharge.    Neck: Normal range of motion, No tenderness, Supple, No stridor.    Lymphatic: No lymphadenopathy noted.    Cardiovascular: Regular rate and rhythm,  No murmurs, No rubs, No gallops.    Thorax & Lungs: Normal breath sounds, No respiratory distress, No wheezing, No chest tenderness.    Abdomen: Bowel sounds normal, Soft, No tenderness, No masses, No pulsatile masses.    Skin: Warm, Dry, No erythema, No rash.    Back: No tenderness, No CVA tenderness.   Extremities: Intact distal pulses, No edema, No tenderness, No cyanosis, No clubbing.    Musculoskeletal: Tenderness on palpation of right lower back and also right hip, decreased range of motion on the right hip. Straight leg raising test not done as patient is feeling pain.  Neurologic: Alert & oriented x 3, Normal motor function, Normal sensory function, No focal deficits noted.    Psychiatric: Affect normal, Judgment normal, Mood normal.       Last lab work done in 11/2017 reviewed and discussed with the patient which is all within normal limits.    Assessment and Plan. The following treatment and monitoring plan is recommended:      1. Essential hypertension  Given the blood pressure uncontrolled on today's visit explained at length regarding checking her blood pressure every day because I do not want the blood pressure " to go down too low if I increase the dose, also explain her on the risk of stroke if it remains uncontrolled. Patient understood and agreed to get the BP log in her next upcoming visit to me and I'll be deciding on possible need of increasing the dose of her lisinopril to 10 mg at that time. Continue the current medications lisinopril 5 mg daily, metoprolol 25 mg daily.     The repeat blood pressure at the time of patient leaving also was elevated at 160s /80s which I believe is likely due to her pain in the right hip and encouraged her to use when necessary Aleve which she understood and agreed.    - Annual Wellness Visit - Includes PPPS Subsequent ()  - metoprolol SR (TOPROL XL) 25 MG TABLET SR 24 HR; Take 1 Tab by mouth every day.  Dispense: 90 Tab; Refill: 3  - lisinopril (PRINIVIL) 5 MG Tab; Take 1 Tab by mouth every day.  Dispense: 90 Tab; Refill: 2    2. Colon cancer screening  Patient agreed to do a FIT will give one this visit.  - OCCULT BLOOD FECES IMMUNOASSAY (FIT); Future  - Annual Wellness Visit - Includes PPPS Subsequent ()    3. Lung nodules  Stable pulmonary nodules as mentioned in history above.  - Annual Wellness Visit - Includes PPPS Subsequent ()    4. Chronic right-sided low back pain with right-sided sciatica  5. Osteoarthritis of right hip, unspecified osteoarthritis type  Patient was reluctant to use any medications but given her uncontrolled pain I counseled her to take naproxen as needed not more than 2 tablets a day which patient understood and agreed.  - Annual Wellness Visit - Includes PPPS Subsequent ()  - Naproxen Sodium (ALEVE) 220 MG Cap; Take 1 Cap by mouth 1 time daily as needed.  Dispense: 30 Cap; Refill: 0    6. Urinary incontinence, unspecified type  Patient is not happy with the previous medication Myrbetriq 500mg daily as it did not cause any relief of her symptoms, I will offer her another medication oxybutynin and discontinued the previous medication  which patient understood and agreed.  - Annual Wellness Visit - Includes PPPS Subsequent ()  - oxybutynin SR (DITROPAN-XL) 10 MG CR tablet; Take 1 Tab by mouth every day.  Dispense: 90 Tab; Refill: 0        Services suggested: No services needed at this time  Health Care Screening: Age-appropriate preventive services recommended by USPTF and ACIP covered by Medicare were discussed today. Services ordered if indicated and agreed upon by the patient.  Referrals offered: PT/OT/Nutrition counseling/Behavioral Health/Smoking cessation as per orders if indicated.    Discussion today about general wellness and lifestyle habits:    · Prevent falls and reduce trip hazards; Cautioned about securing or removing rugs.  · Have a working fire alarm and carbon monoxide detector;   · Engage in regular physical activity and social activities     Follow-up: No Follow-up on file.

## 2018-04-11 NOTE — ASSESSMENT & PLAN NOTE
This is a chronic health problem that is well controlled with current medications and lifestyle measures mostly on the right hip, takes when necessary Aleve occasionally when she needs.

## 2018-04-11 NOTE — ASSESSMENT & PLAN NOTE
This is a chronic health problem that is well controlled with current medications and lifestyle measures. Intermittent right lower back pain with radiation to right lower extremity. Per  patient has responded to daily lower back extension exercises as instructed in the past but currently stopped doing it anymore and is on when necessary Aleve which is giving her relief very rare once in 6 months.

## 2018-04-11 NOTE — ASSESSMENT & PLAN NOTE
This is a chronic health problem that is well controlled with current medications and lifestyle measures. Patient had a CT chest done in 2014 and repeat done in 2017 both of which are showing stable left lower lobe nodule at 8 mm and 7 mm respectively. This is most likely benign.

## 2018-04-11 NOTE — ASSESSMENT & PLAN NOTE
This is a chronic health problem that is well controlled with current medications and lifestyle measures. Patient is currently on medication myrbetriq 50 mg daily which she stopped taking because it doesn't work anymore after she used for one year. Request for an alternative.

## 2018-04-11 NOTE — ASSESSMENT & PLAN NOTE
Declines DTaP which is due at this time, okay for fit test. Had a DEXA scan done in 2011 which was normal at that time denied for any repeat DEXA scan at this time.

## 2018-04-11 NOTE — ASSESSMENT & PLAN NOTE
This is a chronic health problem that is well controlled with current medications and lifestyle measures. She was on cholesterol medications in the past but given her continuous blood work showing normal lipid panel from 2012 to 2015 all her medications were discontinued.

## 2018-04-17 ENCOUNTER — TELEPHONE (OUTPATIENT)
Dept: MEDICAL GROUP | Facility: PHYSICIAN GROUP | Age: 83
End: 2018-04-17

## 2018-04-17 DIAGNOSIS — Z12.11 COLON CANCER SCREENING: ICD-10-CM

## 2018-04-17 LAB
AMBIGUOUS DTTM AMBI4: NORMAL
HEMOCCULT STL QL IA: NEGATIVE

## 2018-04-17 NOTE — TELEPHONE ENCOUNTER
Future Appointments       Provider Department Center    4/18/2018 10:40 AM Monica Rodriguez M.D. [facility]        ESTABLISHED PATIENT PRE-VISIT PLANNING     Note: Patient will not be contacted if there is no indication to call.     1.  Reviewed notes from the last few office visits within the medical group: Yes 04/11/2018    2.  If any orders were placed at last visit or intended to be done for this visit (i.e. 6 mos follow-up), do we have Results/Consult Notes?        •  Labs - Labs ordered, NOT completed. Patient advised to complete prior to next appointment.       •  Imaging - Imaging was not ordered at last office visit.       •  Referrals - No referrals were ordered at last office visit.    3. Is this appointment scheduled as a Hospital Follow-Up? No    4.  Immunizations were updated in [facility] using [employer]?: Yes       •  [employer] Recommendations: TDAP and ZOSTAVAX (Shingles)    5.  Patient is due for the following Health Maintenance Topics:   Health Maintenance Due   Topic Date Due   • IMM DTaP/Tdap/Td Vaccine (1 - Tdap) 04/06/1954   • Annual Wellness Visit  05/20/2015   • COLON CANCER SCREENING ANNUAL FIT  06/30/2016         6.  MDX printed for Provider? NO MDX completed on 04/11/2018    7.  Patient was informed to arrive 15 min prior to their scheduled appointment and bring in their medication bottles.

## 2018-04-18 ENCOUNTER — TELEPHONE (OUTPATIENT)
Dept: MEDICAL GROUP | Facility: PHYSICIAN GROUP | Age: 83
End: 2018-04-18

## 2018-04-18 ENCOUNTER — OFFICE VISIT (OUTPATIENT)
Dept: MEDICAL GROUP | Facility: PHYSICIAN GROUP | Age: 83
End: 2018-04-18
Payer: MEDICARE

## 2018-04-18 VITALS
TEMPERATURE: 98.5 F | OXYGEN SATURATION: 94 % | HEIGHT: 65 IN | WEIGHT: 125 LBS | SYSTOLIC BLOOD PRESSURE: 152 MMHG | DIASTOLIC BLOOD PRESSURE: 72 MMHG | HEART RATE: 87 BPM | BODY MASS INDEX: 20.83 KG/M2

## 2018-04-18 DIAGNOSIS — I10 ESSENTIAL HYPERTENSION: ICD-10-CM

## 2018-04-18 DIAGNOSIS — G89.29 CHRONIC RIGHT-SIDED LOW BACK PAIN WITH RIGHT-SIDED SCIATICA: ICD-10-CM

## 2018-04-18 DIAGNOSIS — R32 URINARY INCONTINENCE, UNSPECIFIED TYPE: ICD-10-CM

## 2018-04-18 DIAGNOSIS — R91.8 LUNG NODULES: ICD-10-CM

## 2018-04-18 DIAGNOSIS — M54.41 CHRONIC RIGHT-SIDED LOW BACK PAIN WITH RIGHT-SIDED SCIATICA: ICD-10-CM

## 2018-04-18 PROCEDURE — 99214 OFFICE O/P EST MOD 30 MIN: CPT | Performed by: INTERNAL MEDICINE

## 2018-04-18 RX ORDER — LISINOPRIL 5 MG/1
7.5 TABLET ORAL DAILY
Qty: 90 TAB | Refills: 1 | Status: SHIPPED | OUTPATIENT
Start: 2018-04-18 | End: 2018-07-06 | Stop reason: SDUPTHER

## 2018-04-18 NOTE — TELEPHONE ENCOUNTER
----- Message from Monica Rodriguez M.D. sent at 4/17/2018 11:21 PM PDT -----  Your FIT test is negative.

## 2018-04-18 NOTE — ASSESSMENT & PLAN NOTE
This is a chronic health problem that is well controlled with current medications and lifestyle measures. Given the last 2 imagings in 2014 and 2017 it has been stable at 7 mm and given her age and no more interventions at this time as per the previous pulmonology which the patient mentions.

## 2018-04-18 NOTE — PROGRESS NOTES
Subjective:   Chief Complaint/History of Present Illness:  Katelyn Hill is a 83 y.o. female established patient who presents today for follow-up visit     Chronic right-sided low back pain with right-sided sciatica  This is a chronic health problem that is well controlled with current medications and lifestyle measures. Was doing physical therapy in the past currently on Aleve when necessary use of very rarely once in 6 months.    HTN (hypertension)  This is a chronic health problem that is uncontrolled with current medications and lifestyle measures. I have reviewed although blood pressure diary which the patient got for the past 7 days, it was ranging between 170-130/93-89. Patient is currently on lisinopril 5 mg daily. Her blood pressure in the office today is 152/72. In comparison to the previous office visit one week back which was 160/80. Denies any symptoms of syncope, palpitations, chest pain at this time but occasional dizziness.    Lung nodules  This is a chronic health problem that is well controlled with current medications and lifestyle measures. Given the last 2 imagings in 2014 and 2017 it has been stable at 7 mm and given her age and no more interventions at this time as per the previous pulmonology which the patient mentions.    PHQ score 0, BMI within normal limits, former tobacco, no recent fall injuries    Patient Active Problem List    Diagnosis Date Noted   • Healthcare maintenance 04/11/2018   • Chronic right-sided low back pain with right-sided sciatica 04/11/2018   • Urinary incontinence 04/11/2018   • Head ache 04/28/2015   • Chest pain 04/28/2015   • Lung nodules    • Torn rotator cuff 07/02/2012   • HTN (hypertension) 05/13/2010   • Dyslipidemia    • OA (osteoarthritis)    • Lactose intolerance        Additional History:   Allergies:    Iodine; Aspirin; Clindamycin; Penicillins; Robitussin [guiatuss]; Shellfish allergy; Tetanus toxoid; and Zithromax [azithromycin  "dihydrate]     Current Medications:     Current Outpatient Prescriptions   Medication Sig Dispense Refill   • oxybutynin SR (DITROPAN-XL) 10 MG CR tablet Take 1 Tab by mouth every day. 90 Tab 0   • metoprolol SR (TOPROL XL) 25 MG TABLET SR 24 HR Take 1 Tab by mouth every day. 90 Tab 3   • lisinopril (PRINIVIL) 5 MG Tab Take 1 Tab by mouth every day. 90 Tab 2   • Naproxen Sodium (ALEVE) 220 MG Cap Take 1 Cap by mouth 1 time daily as needed. 30 Cap 0     No current facility-administered medications for this visit.         Social History:     Social History   Substance Use Topics   • Smoking status: Former Smoker     Packs/day: 1.00     Years: 30.00     Types: Cigarettes     Quit date: 1/1/1985   • Smokeless tobacco: Never Used      Comment: 1 ppd for 30 years, quite1985   • Alcohol use No       ROS:   Constitutional: Denies fevers or chills  Eyes: Denies changes in vision  Ears/Nose/Throat/Mouth: Denies nasal congestion or sore throat   Cardiovascular: Denies chest pain or palpitations   Respiratory: Denies shortness of breath , Denies cough  Gastrointestinal/Hepatic: Denies abd pain, nausea, vomiting   Genitourinary: Denies dysuria or frequency  Musculoskeletal/Rheum: Denies joint pain and swelling   Neurological: Denies headache  Psychiatric: Denies mood disorder   Endocrine: Denies hx of diabetes or thyroid dysfunction  Heme/Oncology/Lymph Nodes: Denies weight changes or enlarged LNs. Allergic/Immunologic: Denies hx of allergies        Objective:     PHYSICAL EXAM  VITAL SIGNS:   /72   Pulse 87   Temp 36.9 °C (98.5 °F)   Ht 1.651 m (5' 5\")   Wt 56.7 kg (125 lb)   LMP 05/01/1985   SpO2 94%   BMI 20.80 kg/m²   Constitutional: Well developed, Well nourished, No acute distress, Non-toxic appearance.    HENT: Normocephalic, Atraumatic, Bilateral external ears normal, Oropharynx moist, No oral exudates, Nose normal.    Eyes: PERRLA, EOMI, Conjunctiva normal, No discharge.    Neck: Normal range of motion, No " tenderness, Supple, No stridor.    Lymphatic: No lymphadenopathy noted.    Cardiovascular: Regular rate and rhythm,  No murmurs, No rubs, No gallops.    Thorax & Lungs: Normal breath sounds, No respiratory distress, No wheezing, No chest tenderness.    Abdomen: Bowel sounds normal, Soft, No tenderness, No masses, No pulsatile masses.    Skin: Warm, Dry, No erythema, No rash.    Back: No tenderness, No CVA tenderness.   Extremities: Intact distal pulses, No edema, No tenderness, No cyanosis, No clubbing.    Musculoskeletal: Good range of motion in all major joints. No tenderness to palpation or major deformities noted.    Neurologic: Alert & oriented x 3, Normal motor function, Normal sensory function, No focal deficits noted.    Psychiatric: Affect normal, Judgment normal, Mood normal.     Health Maintenance:     - Pt is 83-year-old and she doesn't need any more healthcare maintenance at this time as opted by patient too.    Imaging/Labs:     - Reviewed last CT scans done in 2014, 2017 for pulmonary nodules discussed with the patient he does been stable in the left lower lobe.    Assessment and Plan:     1. Chronic right-sided low back pain with right-sided sciatica  Well controlled on when necessary Aleve which she takes occasionally around once in few months. Given instructions for as needed extension exercises of lower extremities which patient understood and agreed.    2. Essential hypertension  Borderline elevated consistently in all her blood pressure readings in the BP diary, blood pressure in the office in the last 2 visits is borderline high will increase her lisinopril from 5 mg to 7.5 mg daily. Given instructions to the patient to check her blood pressure every day before she takes the medication and call me back if there is any abnormal low blood pressures which she understood and agreed.  - lisinopril (PRINIVIL) 5 MG Tab; Take 1.5 Tabs by mouth every day.  Dispense: 90 Tab; Refill: 1    3. Lung  nodules   - Reviewed last CT scans done in 2014, 2017 for pulmonary nodules discussed with the patient it has been stable in the left lower lobe for both the times and as per the pulmonology discussion they're not going to do any intervention at this age. I do not believe that she needs any more follow-up CT scans on this. Currently is symptomatic.    4. Urinary incontinence, unspecified type  Well controlled on oxybutynin 10 mg daily. Patient continues to wear the diaper.    RTC: Annual wellness visit, one year    PLEASE NOTE: This dictation was created using voice recognition software. I have made every reasonable attempt to correct obvious errors, but I expect that there are errors of grammar and possibly content that I did not discover before finalizing the note.

## 2018-04-18 NOTE — ASSESSMENT & PLAN NOTE
This is a chronic health problem that is well controlled with current medications and lifestyle measures. Was doing physical therapy in the past currently on Aleve when necessary use of very rarely once in 6 months.

## 2018-04-18 NOTE — ASSESSMENT & PLAN NOTE
This is a chronic health problem that is uncontrolled with current medications and lifestyle measures. I have reviewed although blood pressure diary which the patient got for the past 7 days, it was ranging between 170-130/93-89. Patient is currently on lisinopril 5 mg daily. Her blood pressure in the office today is 152/72. In comparison to the previous office visit one week back which was 160/80. Denies any symptoms of syncope, palpitations, chest pain at this time but occasional dizziness.

## 2018-05-05 ENCOUNTER — OFFICE VISIT (OUTPATIENT)
Dept: URGENT CARE | Facility: PHYSICIAN GROUP | Age: 83
End: 2018-05-05
Payer: MEDICARE

## 2018-05-05 VITALS
HEIGHT: 65 IN | HEART RATE: 48 BPM | DIASTOLIC BLOOD PRESSURE: 80 MMHG | OXYGEN SATURATION: 84 % | SYSTOLIC BLOOD PRESSURE: 130 MMHG | WEIGHT: 123.6 LBS | TEMPERATURE: 98.7 F | BODY MASS INDEX: 20.59 KG/M2

## 2018-05-05 DIAGNOSIS — R19.7 DIARRHEA, UNSPECIFIED TYPE: ICD-10-CM

## 2018-05-05 PROCEDURE — 99214 OFFICE O/P EST MOD 30 MIN: CPT | Performed by: NURSE PRACTITIONER

## 2018-05-05 ASSESSMENT — ENCOUNTER SYMPTOMS
BRUISES/BLEEDS EASILY: 0
ABDOMINAL PAIN: 0
NAUSEA: 0
VOMITING: 0
CONSTIPATION: 0
MYALGIAS: 0
FEVER: 0
HEADACHES: 0
DIARRHEA: 1
CHILLS: 0

## 2018-05-05 NOTE — PROGRESS NOTES
"Subjective:      Katelyn Hill is a 83 y.o. female who presents with Medication Reaction (Diarrhea from meds, x2 days)            Medications, Allergies and Prior Medical Hx reviewed and updated in [facility].with patient/family today     Patient presents with diarrhea starting 2 days ago. Patient states that she had a medication change at the same time and she believes that her diarrhea was caused from the medications. Patient is on metoprolol, lisinopril for blood pressure. She is also on oxybutynin.  Patient has taken all 3 medications for a while, but her lisinopril was increased from 10-15 mg per day. Patient believes this is what caused her diarrhea. She had 2 days of diarrhea. She took 2 Imodium and the diarrhea has stopped. She also stopped her medications.    Diarrhea    This is a new problem. The current episode started in the past 7 days (2 days). The problem has been gradually improving. The stool consistency is described as watery. Pertinent negatives include no abdominal pain, chills, fever, headaches, myalgias or vomiting. Nothing aggravates the symptoms. She has tried anti-motility drug and change of diet for the symptoms. The treatment provided no relief. There is no history of bowel resection.       Review of Systems   Constitutional: Negative for chills, fever and malaise/fatigue.   Cardiovascular: Negative for chest pain.   Gastrointestinal: Positive for diarrhea. Negative for abdominal pain, constipation, nausea and vomiting.   Musculoskeletal: Negative for myalgias.   Neurological: Negative for headaches.   Endo/Heme/Allergies: Does not bruise/bleed easily.          Objective:     /80   Pulse (!) 48   Temp 37.1 °C (98.7 °F)   Ht 1.651 m (5' 5\")   Wt 56.1 kg (123 lb 9.6 oz)   LMP 05/01/1985   SpO2 (!) 84%   BMI 20.57 kg/m²      Physical Exam   Constitutional: She appears well-developed and well-nourished. No distress.   HENT:   Head: Normocephalic and atraumatic.   Eyes: " Conjunctivae are normal. Pupils are equal, round, and reactive to light.   Neck: Neck supple.   Cardiovascular: Normal rate.    Pulmonary/Chest: Effort normal. No respiratory distress.   Abdominal: Soft. Bowel sounds are normal. She exhibits no distension and no fluid wave. There is no tenderness. There is no rigidity, no CVA tenderness, no tenderness at McBurney's point and negative Keys's sign.   Musculoskeletal: She exhibits no edema.   Neurological: She is alert.   Awake, alert, answering questions appropriately, moving all extremeties   Skin: Skin is warm and dry.   Psychiatric: She has a normal mood and affect. Her behavior is normal.   Vitals reviewed.              Assessment/Plan:     1. Diarrhea, unspecified type         Reviewed patient's chart. She was placed on increased lisinopril due to borderline blood pressure. Will have the patient return to her previous dosages of medications. Patient will use Imodium as needed. She was giving instruction sheets for diet for diarrhea and poor hypertension. She was also given the name of an over-the-counter probiotic. She may use. She was also given a appointment with her PCP for recheck of her blood pressure medications.    Pt verbalizes understanding and agreement with plan.

## 2018-05-05 NOTE — PATIENT INSTRUCTIONS
Hipertensión  (Hypertension)  La hipertensión, conocida comúnmente alondra presión arterial stephanie, se produce cuando la art bombea en las arterias con mucha fuerza. Las arterias son los vasos sanguíneos que transportan la art desde el corazón hacia todas las partes del cuerpo. Bri lectura de la presión arterial consiste en un número más alto sobre un número más bajo, por ejemplo, 110/72. El número más alto (presión sistólica) corresponde a la presión interna de las arterias cuando el corazón bombea art. El número más bajo (presión diastólica) corresponde a la presión interna de las arterias cuando el corazón se relaja. En condiciones ideales, la presión arterial debe ser inferior a 120/80.  La hipertensión fuerza al corazón a trabajar más para bombear la art. Las arterias pueden estrecharse o ponerse rígidas. La hipertensión no tratada o no controlada puede causar infarto de miocardio, ictus, enfermedad renal y otros problemas.  FACTORES DE RIESGO  Algunos factores de riesgo de hipertensión son controlables, nubia otros no lo son.  Entre los factores de riesgo que usted no puede controlar, se incluyen los siguientes:  · La viktoria. El riesgo es mayor para las personas afroamericanas.  · La edad. Los riesgos aumentan con la edad.  · El sexo. Antes de los 45 años, los hombres corren más riesgo que las mujeres. Después de los 65 años, las mujeres corren más riesgo que los hombres.  Entre los factores de riesgo que usted puede controlar, se incluyen los siguientes:  · No hacer la cantidad suficiente de actividad física o ejercicio.  · Tener sobrepeso.  · Consumir mucha grasa, azúcar, calorías o sal en la dieta.  · Beber alcohol en exceso.  SIGNOS Y SÍNTOMAS  Por lo general, la hipertensión no causa signos o síntomas. La hipertensión arterial demasiado stephanie (crisis hipertensiva) puede causar dolor de ben, ansiedad, falta de aire y hemorragia nasal.  DIAGNÓSTICO  Para detectar si usted tiene hipertensión,  el médico le medirá la presión arterial mientras esté sentado, con el brazo levantado a la altura del corazón. Debe medirla al menos dos veces en el mismo brazo. Determinadas condiciones pueden causar vijaya diferencia de presión arterial entre el brazo quinten y el derecho. El hecho de tener vijaya kevan lectura de la presión arterial más stephanie que lo normal no significa que necesita un tratamiento. Si no está buffy si tiene hipertensión arterial, es posible que se le pida que regrese otro día para volver a controlarle la presión arterial. O chicho se le puede pedir que se controle la presión arterial en angelo casa divya 1 o más meses.  TRATAMIENTO  El tratamiento de la hipertensión arterial incluye hacer cambios en el estilo de aditi y, posiblemente, aiyana medicamentos. Un estilo de aditi saludable puede ayudar a bajar la presión arterial stephanie. Quizá deba cambiar algunos hábitos.  Los cambios en el estilo de aditi pueden incluir lo siguiente:  · Seguir la dieta DASH. Esta dieta tiene un alto contenido de frutas, verduras y cereales integrales. Incluye poca cantidad de sal, latonya ocampo y azúcares agregados.  · Mantenga el consumo de sodio por debajo de 2 300 mg por día.  · Realizar al menos entre 30 y 45 minutos de ejercicio aeróbico, 4 veces por semana alondra mínimo.  · Perder peso, si es necesario.  · No fumar.  · Limitar el consumo de bebidas alcohólicas.  · Aprender formas de reducir el estrés.  El médico puede recetarle medicamentos si los cambios en el estilo de aditi no son suficientes para lograr controlar la presión arterial y si vijaya de las siguientes afirmaciones es verdadera:  · Tiene entre 18 y 59 años y angelo presión arterial sistólica está por encima de 140.  · Tiene 60 años o más y angelo presión arterial sistólica está por encima de 150.  · Angelo presión arterial diastólica está por encima de 90.  · Tiene diabetes y angelo presión arterial sistólica está por encima de 140 o angelo presión arterial diastólica está por encima de  90.  · Tiene vijaya enfermedad renal y finch presión arterial está por encima de 140/90.  · Tiene vijaya enfermedad cardíaca y finch presión arterial está por encima de 140/90.  La presión arterial deseada puede variar en función de las enfermedades, la edad y otros factores personales.  INSTRUCCIONES PARA EL CUIDADO EN EL HOGAR  · Tawanna que le midan de nuevo la presión arterial según las indicaciones del médico.  · Bluff los medicamentos solamente alondra se lo haya indicado el médico. Siga cuidadosamente las indicaciones. Los medicamentos para la presión arterial deben tomarse según las indicaciones. Los medicamentos pierden eficacia al omitir las dosis. El hecho de omitir las dosis también aumenta el riesgo de otros problemas.  · No fume.  · Contrólese la presión arterial en finch casa según las indicaciones del médico.  SOLICITE ATENCIÓN MÉDICA SI:  · Piensa que tiene vijaya reacción alérgica a los medicamentos.  · Tiene mareos o adrian de ben con recurrencia.  · Tiene hinchazón en los tobillos.  · Tiene problemas de visión.  SOLICITE ATENCIÓN MÉDICA DE INMEDIATO SI:  · Siente un dolor de ben intenso o confusión.  · Siente debilidad inusual, adormecimiento o que se desmayará.  · Siente dolor intenso en el pecho o en el abdomen.  · Vomita repetidas veces.  · Tiene dificultad para respirar.  ASEGÚRESE DE QUE:  · Comprende estas instrucciones.  · Controlará finch afección.  · Recibirá ayuda de inmediato si no mejora o si empeora.  Esta información no tiene alondra fin reemplazar el consejo del médico. Asegúrese de hacerle al médico cualquier pregunta que tenga.  Document Released: 12/18/2006 Document Revised: 05/03/2016 Document Reviewed: 10/10/2014  Elsevier Interactive Patient Education © 2017 Elsevier Inc.        Opciones de alimentos para ayudar a aliviar la diarrea - Adultos  (Food Choices to Help Relieve Diarrhea, Adult)  Cuando se tiene diarrea, los alimentos que se ingieren y los hábitos de alimentación son muy importantes.  Elegir los alimentos y las bebidas adecuados ayuda a aliviar la diarrea. Además, debido a que la diarrea puede durar hasta siete kayden, debe reponer la pérdida de líquidos y electrolitos (alondra sodio, potasio y cloro) a fin de ayudar a evitar la deshidratación.   ¿QUÉ PAUTAS GENERALES LUIS SEGUIR?  · Janna lentamente 1 taza (8 onzas) de líquido por cada episodio de diarrea. Si jazmyn rbi cantidad de líquidos suficiente, la orina será de tony buffy o color amarillo pálido.  · Consuma alimentos con almidón. Algunas buenas opciones son arroz wolf, tostada catalina, pasta, cereales con bajo contenido de fibras, issac al horno (sin cáscara), galletas saladas y panecillos.  · Evite las porciones grandes de cualquier vegetal cocido.  · Limite las frutas a dos porciones por día. Bri porción es ½ taza o un trozo pequeño.  · Alimentos con menos de 2 g de fibra por porción.  · Limite las grasas a menos de 8 cucharaditas (38 g) por día.  · Evite las comidas fritas.  · Consuma alimentos que contengan probióticos. Los probióticos se encuentran en ciertos productos lácteos.  · Evite los alimentos y las bebidas que pueden aumentar la velocidad a la que el alimento se mueve a través del estómago y de los intestinos (tracto gastrointestinal). Lo que debe evitar:  ¨ Alimentos ricos en fibra, alondra frutas secas, frutas y vegetales crudos, amee secos, semillas, alimentos con cereales integrales.  ¨ Alimentos muy condimentados y con alto contenido de grasas.  ¨ Alimentos y bebidas endulzados con jarabe de maíz de alto contenido de fructosa, miel o alcoholes de azúcar, alondra xilitol, sorbitol y manitol.  ¿QUÉ ALIMENTOS SE RECOMIENDAN?  Cereales  Arroz wolf. Pan wolf, francés o brandi (fresco o arlet), incluidos los panecillos, los bollos y las rosquillas. Pastas temo. Galletas de agua, saladas o Philippe. Pretzels. Cereales con bajo contenido de fibra Cereales cocidos en agua (alondra harina de maíz, sémola o crema de cereales). Muffins. Pan  ácimo Tostada Antonia. Biscote.   Vegetales  Lo (sin cáscara). Jugo de tomates o de vegetales Vegetales chicho cocidos o enlatados sin semillas. Ben tierna.  Frutas  Puré de manzanas cocido o enlatado, damascos, cerezas, cóctel de frutas, pomelos, duraznos, peras o ciruelas. Bananas frescas, manzanas sin cáscara, cerezas, uvas, melón, pomelo, duraznos, naranjas o ciruelas.   Cyndy y otros productos con proteínas  Benoit al horno o hervido. Huevos. Tofu. Pescado. Mariscos. Mantequilla de maní, sin trozos. Carne molida o un bife tierno chicho cocido, jamón, ternera, parisi, cerdo o aves.   Lácteos  Yogur natural, kefir y yogur bebible sin endulzar. Leche sin lactosa, maru de leche o leche de soja. Queso isela común.  Bebidas  Bebidas deportivas. Caldos lynn. Jugos de fruta diluidos (excepto de ciruelas). Gaseosas sin cafeína comunes, alondra gaseosa de jengibre. Agua. Tés descafeinados. Soluciones de rehidratación oral. Bebidas sin azúcar no endulzadas con alcoholes de azúcar.  Otros  Consomé, caldo o sopas hechas con los alimentos recomendados.   Los artículos mencionados arriba pueden no ser vijaya lista completa de las bebidas o los alimentos recomendados. Comuníquese con el nutricionista para conocer más opciones.  ¿QUÉ ALIMENTOS NO SE RECOMIENDAN?  Cereales  Cereales, galletas, pastas, panecillos y panes de cereales integrales, salvado o serrato. Arroz integral o arroz sumi. Cereales con menos de 2 g de fibra por porción. Tortillas de maíz o tacos. Harina de ilir cocida o seca. Granola. Palomitas de maíz.  Vegetales  Vegetales crudos. Repollo, brócoli, repollitos de Bruselas, alcachofas, porotos, hojas de remolacha, maíz, col rizada, legumbres, guisantes y batatas. Cáscara de lo. [place]spinaca y repollo cocidos.  Frutas  Frutas secas, incluidas las ciruelas y los dátiles. Frutas crudas. Compota o ciruelas secas. Manzanas frescas con cáscara, damascos, mangos, peras, frambuesas y frutillas.   Cyndy y otros  productos con proteínas  Mantequilla de maní espesa. Cholo secos y semillas. Porotos y lentejas. Panceta.   Lácteos  Quesos con alto contenido de grasas. Leche, leche chocolatada y bebidas hechas con leche, alondra los batidos. Crema. Helados.  Dulces y postres  Panecillos dulces, donas y pan mike. Panqueques y waffles.  Grasas y aceites  [place]antequilla. Salsas a base de crema. Margarina. Aceites para ensaladas. Condimentos para ensaladas. Vardaman. Aguacates.   Bebidas  Bebidas con cafeína (alondra café, té, refrescos o bebidas energizantes). Bebidas alcohólicas. Jugos de frutas con pulpa. Jugo de ciruelas. Bebidas endulzadas con jarabe de maíz de alto contenido de fructosa o alcoholes de azúcar.  Otros  Melina. Salsa picante. [place] en polvo. Mayonesa. Salsas. Sopas a base de crema o de leche.   Los artículos mencionados arriba pueden no ser vijaya lista completa de las bebidas y los alimentos que se deben evitar. Comuníquese con el nutricionista para recibir más información.  ¿QUÉ LUIS HACER SI ME DESHIDRATO?  Algunas veces, la diarrea puede producir deshidratación. Entre los signos de deshidratación se incluyen la orina oscura y la boca y la piel secas. Si piensa que está deshidratado, debe rehidratarse con vijaya solución de rehidratación oral. Estas soluciones se pueden comprar en las farmacias, en las tiendas minoristas o por Internet.   Janna ½ o 1 taza (120-240 ml) de solución de rehidratación oral cada vez que tenga un episodio de diarrea. Si beber esta cantidad empeora la diarrea, intente beber en cantidades más pequeñas con más frecuencia. Por ejemplo, aiyana 1-3 cucharaditas (5-15 ml) cada 5-10 minutos.   Vijaya derrek general para mantenerse hidratado es beber 1 ½ -2 litros de líquido por día. Hable con el médico sobre la cantidad específica que usted debe beber diariamente. Janna suficiente líquido para mantener la orina marta o de color amarillo pálido.     Esta información no tiene alondra fin reemplazar el consejo del  médico. Asegúrese de hacerle al médico cualquier pregunta que tenga.     Document Released: 12/18/2006 Document Revised: 01/08/2016  Elsevier Interactive Patient Education ©2016 Elsevier Inc.

## 2018-07-05 ENCOUNTER — TELEPHONE (OUTPATIENT)
Dept: MEDICAL GROUP | Facility: PHYSICIAN GROUP | Age: 83
End: 2018-07-05

## 2018-07-05 NOTE — TELEPHONE ENCOUNTER
Future Appointments       Provider Department Center    7/6/2018 9:20 AM Monica Rodriguez M.D. [facility]        ESTABLISHED PATIENT PRE-VISIT PLANNING     Note: Patient will not be contacted if there is no indication to call.     1.  Reviewed notes from the last few office visits within the medical group: Yes    2.  If any orders were placed at last visit or intended to be done for this visit (i.e. 6 mos follow-up), do we have Results/Consult Notes?        •  Labs - Labs were not ordered at last office visit.       •  Imaging - Imaging was not ordered at last office visit.       •  Referrals - No referrals were ordered at last office visit.    3. Is this appointment scheduled as a Hospital Follow-Up? No    4.  Immunizations were updated in Epic using WebIZ?: Yes       •  Web Iz Recommendations: FLU, TDAP and ZOSTAVAX (Shingles)    5.  Patient is due for the following Health Maintenance Topics:   Health Maintenance Due   Topic Date Due   • IMM DTaP/Tdap/Td Vaccine (1 - Tdap) 04/06/1954   • Annual Wellness Visit  05/20/2015         6.  MDX printed for Provider? NO completed and compliant on 04/11/18    7.  Patient was informed to arrive 15 min prior to their scheduled appointment and bring in their medication bottles. ZAY

## 2018-07-06 ENCOUNTER — OFFICE VISIT (OUTPATIENT)
Dept: MEDICAL GROUP | Facility: PHYSICIAN GROUP | Age: 83
End: 2018-07-06
Payer: MEDICARE

## 2018-07-06 VITALS
HEART RATE: 74 BPM | TEMPERATURE: 97.9 F | DIASTOLIC BLOOD PRESSURE: 70 MMHG | SYSTOLIC BLOOD PRESSURE: 124 MMHG | RESPIRATION RATE: 16 BRPM | OXYGEN SATURATION: 94 % | WEIGHT: 122.5 LBS | HEIGHT: 65 IN | BODY MASS INDEX: 20.41 KG/M2

## 2018-07-06 DIAGNOSIS — M16.11 OSTEOARTHRITIS OF RIGHT HIP, UNSPECIFIED OSTEOARTHRITIS TYPE: ICD-10-CM

## 2018-07-06 DIAGNOSIS — R32 URINARY INCONTINENCE, UNSPECIFIED TYPE: ICD-10-CM

## 2018-07-06 DIAGNOSIS — E73.9 LACTOSE INTOLERANCE: ICD-10-CM

## 2018-07-06 DIAGNOSIS — I10 ESSENTIAL HYPERTENSION: ICD-10-CM

## 2018-07-06 PROCEDURE — 99214 OFFICE O/P EST MOD 30 MIN: CPT | Performed by: INTERNAL MEDICINE

## 2018-07-06 RX ORDER — LISINOPRIL 5 MG/1
5 TABLET ORAL DAILY
Qty: 90 TAB | Refills: 1 | Status: SHIPPED | OUTPATIENT
Start: 2018-07-06 | End: 2019-01-01 | Stop reason: SDUPTHER

## 2018-07-06 NOTE — ASSESSMENT & PLAN NOTE
This is a chronic health problem that is uncontrolled with current medications and lifestyle measures. Patient was prescribed oxybutynin 10 mg daily and her last visit, she feels that she has pain during micturition whenever she takes it. Stopped taking it.

## 2018-07-06 NOTE — ASSESSMENT & PLAN NOTE
This is a chronic health problem that is well controlled with current medications and lifestyle measures. Well controlled on naproxen to 20 mg daily when necessary occasionally around once a week.

## 2018-07-06 NOTE — PROGRESS NOTES
CC: Follow-up visit, hypertension.    HISTORY OF PRESENT ILLNESS: Patient is a 83 y.o. female established patient who presents today to discuss on her medical conditions as mentioned in history of present illness below. Patient recently visited urgent care in May 2018 for diarrhea and thinks that that pressure medication is causing this, discussed on all the possibilities of diarrhea due to lactose intolerance which she has in her problem list.    Health Maintenance: Completed    HTN (hypertension)  This is a chronic health problem that is well controlled with current medications and lifestyle measures. The patient blood pressure was ranging between 152/72 in the previous visits and also home BP log ranging between 159/86 when we increased the lisinopril from 5 mg to 7.5 mg daily. The patient suddenly started developing diarrhea for which she went to urgent care and believes that this is only because of the increasing the dose of lisinopril.    Lactose Intolerance  This is a chronic health problem that is uncontrolled with current medications and lifestyle measures. Recent episodes of diarrhea with urgent care visit, believes that after stopping the lisinopril it has resolved.    OA (Osteoarthritis)  This is a chronic health problem that is well controlled with current medications and lifestyle measures. Well controlled on naproxen to 20 mg daily when necessary occasionally around once a week.    Urinary incontinence  This is a chronic health problem that is uncontrolled with current medications and lifestyle measures. Patient was prescribed oxybutynin 10 mg daily and her last visit, she feels that she has pain during micturition whenever she takes it. Stopped taking it.      PHQ score 0, BMI within normal limits, former tobacco, no recent fall injuries after the last accidental fall injury 2 years back, uses cane for ambulation.    Patient Active Problem List    Diagnosis Date Noted   • Healthcare maintenance  04/11/2018   • Chronic right-sided low back pain with right-sided sciatica 04/11/2018   • Urinary incontinence 04/11/2018   • Head ache 04/28/2015   • Chest pain 04/28/2015   • Lung nodules    • Torn rotator cuff 07/02/2012   • HTN (hypertension) 05/13/2010   • Dyslipidemia    • OA (osteoarthritis)    • Lactose intolerance       Allergies:Iodine; Aspirin; Clindamycin; Penicillins; Robitussin [guiatuss]; Shellfish allergy; Tetanus toxoid; and Zithromax [azithromycin dihydrate]    Current Outpatient Prescriptions   Medication Sig Dispense Refill   • lisinopril (PRINIVIL) 5 MG Tab Take 1 Tab by mouth every day. 90 Tab 1   • Naproxen Sodium (ALEVE) 220 MG Cap Take 1 Cap by mouth 1 time daily as needed. 30 Cap 0   • oxybutynin SR (DITROPAN-XL) 10 MG CR tablet Take 1 Tab by mouth every day. 90 Tab 0   • metoprolol SR (TOPROL XL) 25 MG TABLET SR 24 HR Take 1 Tab by mouth every day. 90 Tab 3     No current facility-administered medications for this visit.        Social History   Substance Use Topics   • Smoking status: Former Smoker     Packs/day: 1.00     Years: 30.00     Types: Cigarettes     Quit date: 1/1/1985   • Smokeless tobacco: Never Used      Comment: 1 ppd for 30 years, quite1985   • Alcohol use No     Social History     Social History Narrative   • No narrative on file       Family History   Problem Relation Age of Onset   • Heart Disease Mother    • Heart Disease Father      enlarged heart   • Diabetes Son    • Cancer Maternal Grandmother      suicide    • Cancer Maternal Grandfather    • Cancer Paternal Grandmother      Lung cancer         ROS:     - Constitutional:  Negative for fever, chills, unexpected weight change, and fatigue/generalized weakness.    - HEENT:  Negative for headaches, vision changes, hearing changes, ear pain, ear discharge, rhinorrhea, sinus congestion, sore throat, and neck pain.      - Respiratory: Negative for cough, sputum production, chest congestion, dyspnea, wheezing, and  "crackles.      - Cardiovascular: Negative for chest pain, palpitations, orthopnea, and bilateral lower extremity edema.     - Gastrointestinal: Negative for heartburn, nausea, vomiting, abdominal pain, hematochezia, melena, diarrhea, constipation, and greasy/foul-smelling stools.     - Genitourinary: Negative for dysuria, polyuria, hematuria, pyuria, urinary urgency, and urinary incontinence.     - Musculoskeletal: Positive for occasional right lower extremity arthritis pains Negative for myalgias.    - Skin: Negative for rash, itching, cyanotic skin color change.     - Neurological: Negative for dizziness, tingling, tremors, focal sensory deficit, focal weakness and headaches.     - Endo/Heme/Allergies: Does not bruise/bleed easily.     - Psychiatric/Behavioral: Negative for depression, suicidal/homicidal ideation and memory loss.      Last lab work done in November 2017 reviewed and discussed with the patient.      Exam:    Blood pressure 124/70, pulse 74, temperature 36.6 °C (97.9 °F), resp. rate 16, height 1.651 m (5' 5\"), weight 55.6 kg (122 lb 8 oz), last menstrual period 05/01/1985, SpO2 94 %. Body mass index is 20.39 kg/m².    General:  Well nourished, well developed female in NAD  Head is grossly normal.  Neck: Supple without JVD or bruit. Thyroid is not enlarged.  Pulmonary: Clear to ausculation and percussion.  Normal effort. No rales, ronchi, or wheezing.  Cardiovascular: Regular rate and rhythm without murmur. Carotid and radial pulses are intact and equal bilaterally.  Extremities: no clubbing, cyanosis, or edema.      Please note that this dictation was created using voice recognition software. I have made every reasonable attempt to correct obvious errors, but I expect that there are errors of grammar and possibly content that I did not discover before finalizing the note.    Assessment/Plan:  1. Essential hypertension  Patient is under the impression that her lisinopril is causing diarrhea, " discussed at length to convince that it might be her lactose intolerance and she had eaten food containing lactose causing this. Continue lisinopril at low-dose 5 mg daily and given instructions to check blood pressure every day before taking the medication which he understood and agreed.  - lisinopril (PRINIVIL) 5 MG Tab; Take 1 Tab by mouth every day.  Dispense: 90 Tab; Refill: 1    2. Lactose intolerance  Amyloid food and diary products containing lactose, which patient uses only non-lactose.    3. Osteoarthritis of right hip, unspecified osteoarthritis type  Continue over-the-counter naproxen 200 mg capsule daily when necessary for pain. Which she occasionally needs only once a week.    4. Urinary incontinence, unspecified type  Continue oxybutynin 10 mg once a day for incontinence.

## 2018-07-06 NOTE — PATIENT INSTRUCTIONS
"DASH diet  Dietary Approaches to Stop Hypertension trial -- A different approach was evaluated in the Dietary Approaches to Stop Hypertension (DASH) trial [6]. Rather than evaluating sodium intake or weight loss, DASH randomly assigned 459 patients with BPs of less than 160/80 to 95 mmHg to one of three diets:  ?A control diet low in fruits, vegetables, and legumes and high in snacks, sweets, meats, and saturated fat.  ?A diet rich in fruits, vegetables, legumes and low in snacks and sweets.  ?A combination diet rich in fruits, vegetables, legumes, and low-fat dairy products and low in snacks, sweets, meats, and saturated and total fat (this combination diet is called the \"DASH diet\"). The DASH diet is comprised of four to five servings of fruit, four to five servings of vegetables, two to three servings of low-fat dairy per day, and <25 percent fat.  The following observations were noted in which the BP reductions were expressed in relation to the fall in BP seen with the control diet:  ?The fruits and vegetables diet reduced the BP by 2.8/1.1 mmHg, and the combination diet reduced the BP by 5.5/3.0.  ?These effects were more pronounced in patients with hypertension. With the combination diet, for example, the BP fell 11.4/5.5 mmHg in hypertensives versus 3.5/2.1 mmHg in the normotensives.  ?The antihypertensive effects were maximal by the end of week 2 with any of the diets and were then maintained for eight weeks.    "

## 2018-07-06 NOTE — ASSESSMENT & PLAN NOTE
This is a chronic health problem that is well controlled with current medications and lifestyle measures. The patient blood pressure was ranging between 152/72 in the previous visits and also home BP log ranging between 159/86 when we increased the lisinopril from 5 mg to 7.5 mg daily. The patient suddenly started developing diarrhea for which she went to urgent care and believes that this is only because of the increasing the dose of lisinopril.

## 2018-07-06 NOTE — ASSESSMENT & PLAN NOTE
This is a chronic health problem that is uncontrolled with current medications and lifestyle measures. Recent episodes of diarrhea with urgent care visit, believes that after stopping the lisinopril it has resolved.

## 2018-08-01 ENCOUNTER — OFFICE VISIT (OUTPATIENT)
Dept: URGENT CARE | Facility: PHYSICIAN GROUP | Age: 83
End: 2018-08-01
Payer: MEDICARE

## 2018-08-01 VITALS
TEMPERATURE: 97.5 F | BODY MASS INDEX: 19.33 KG/M2 | OXYGEN SATURATION: 94 % | WEIGHT: 116 LBS | SYSTOLIC BLOOD PRESSURE: 132 MMHG | HEART RATE: 84 BPM | DIASTOLIC BLOOD PRESSURE: 76 MMHG | HEIGHT: 65 IN

## 2018-08-01 DIAGNOSIS — R19.5 LOOSE STOOLS: ICD-10-CM

## 2018-08-01 DIAGNOSIS — I10 HYPERTENSION, UNSPECIFIED TYPE: ICD-10-CM

## 2018-08-01 DIAGNOSIS — E73.9 LACTOSE INTOLERANCE: ICD-10-CM

## 2018-08-01 LAB
APPEARANCE UR: CLEAR
BILIRUB UR STRIP-MCNC: NORMAL MG/DL
COLOR UR AUTO: YELLOW
GLUCOSE UR STRIP.AUTO-MCNC: NORMAL MG/DL
KETONES UR STRIP.AUTO-MCNC: NORMAL MG/DL
LEUKOCYTE ESTERASE UR QL STRIP.AUTO: NORMAL
NITRITE UR QL STRIP.AUTO: NORMAL
PH UR STRIP.AUTO: 5 [PH] (ref 5–8)
PROT UR QL STRIP: 30 MG/DL
RBC UR QL AUTO: NORMAL
SP GR UR STRIP.AUTO: 1.02
UROBILINOGEN UR STRIP-MCNC: NORMAL MG/DL

## 2018-08-01 PROCEDURE — 99214 OFFICE O/P EST MOD 30 MIN: CPT | Performed by: PHYSICIAN ASSISTANT

## 2018-08-01 PROCEDURE — 81002 URINALYSIS NONAUTO W/O SCOPE: CPT | Performed by: PHYSICIAN ASSISTANT

## 2018-08-01 ASSESSMENT — ENCOUNTER SYMPTOMS
COUGH: 0
FALLS: 0
SORE THROAT: 0
CONSTIPATION: 0
FEVER: 0
EYE DISCHARGE: 0
HEADACHES: 0
VOMITING: 0
BLOOD IN STOOL: 0
EYE REDNESS: 0
WEIGHT LOSS: 1
DIZZINESS: 0
DIARRHEA: 1
NAUSEA: 1
ABDOMINAL PAIN: 1
WHEEZING: 0

## 2018-08-01 NOTE — PROGRESS NOTES
Subjective:      Katelyn Hill is a 83 y.o. female who presents with Diarrhea (Dizziness, nausea, lightheadedness x 4 days )            Patient is an 83-year-old female who presents today with concern about recent episodes of loose stool.  Patient is with her sons today who provide assistance with translation and history.  Patient reports today that she feels that the lisinopril is causing her loose stools of which at first patient is taking 1-1/2 doses to equal 7.5 mg last week-of which she admits she felt that this caused some diarrhea.  Patient then got off of this for a few days however then felt guilty and then got back onto the medication but only 1 tab once a day. She continues to take Metoprolol along with Oxybutynin- she reports that she has not had any significant diet changes although is very unhappy with her weight currently.  Her sons inquire about what diet changes that should make to allow her to gain weight although the patient reports that she is not that hungry.  Patient does admit that she continues to eat dairy of which she had a piece of pizza a few days ago followed by she is on her sandwiches almost daily. She denies any vomiting, fevers. She does complain of dry mouth- however she has had this for several years.  Patient denies any ill contacts, recent antibiotic use, recent hospitalization or suspicion for mild food intake.      Diarrhea    This is a recurrent problem. Episode onset: 2-3 days ago. The problem occurs less than 2 times per day (up to 2 times/day. ). The problem has been gradually improving. The stool consistency is described as watery. The patient states that diarrhea does not awaken her from sleep. Associated symptoms include abdominal pain and weight loss. Pertinent negatives include no coughing, fever, headaches or vomiting. Associated symptoms comments: Reports cramping abd. Pain.   . The symptoms are aggravated by dairy products and stress. She has tried  "nothing for the symptoms.       Review of Systems   Constitutional: Positive for malaise/fatigue and weight loss. Negative for fever.   HENT: Negative for congestion and sore throat.    Eyes: Negative for discharge and redness.   Respiratory: Negative for cough and wheezing.    Gastrointestinal: Positive for abdominal pain, diarrhea and nausea. Negative for blood in stool, constipation and vomiting.   Genitourinary: Positive for frequency and urgency. Negative for dysuria.        Hx of incontinence     Musculoskeletal: Negative for falls.   Skin: Negative for itching and rash.   Neurological: Negative for dizziness and headaches.   All other systems reviewed and are negative.         Objective:     /76   Pulse 84   Temp 36.4 °C (97.5 °F)   Ht 1.651 m (5' 5\")   Wt 52.6 kg (116 lb)   LMP 05/01/1985   SpO2 94%   BMI 19.30 kg/m²    PMH:  has a past medical history of Dyslipidemia; Hypertension; Kidney calculi; Lactose intolerance; Lung nodules; MEDICAL HOME; OA (osteoarthritis); Raynaud's disease /phenomenon; and Torn rotator cuff (2008). She also has no past medical history of Breast cancer (HCC) or Hypercholesteremia.  MEDS:   Current Outpatient Prescriptions:   •  lisinopril (PRINIVIL) 5 MG Tab, Take 1 Tab by mouth every day., Disp: 90 Tab, Rfl: 1  •  Naproxen Sodium (ALEVE) 220 MG Cap, Take 1 Cap by mouth 1 time daily as needed., Disp: 30 Cap, Rfl: 0  •  oxybutynin SR (DITROPAN-XL) 10 MG CR tablet, Take 1 Tab by mouth every day., Disp: 90 Tab, Rfl: 0  •  metoprolol SR (TOPROL XL) 25 MG TABLET SR 24 HR, Take 1 Tab by mouth every day., Disp: 90 Tab, Rfl: 3  ALLERGIES:   Allergies   Allergen Reactions   • Iodine Anaphylaxis and Swelling   • Aspirin    • Clindamycin    • Penicillins    • Robitussin [Guiatuss] Diarrhea   • Shellfish Allergy    • Tetanus Toxoid    • Zithromax [Azithromycin Dihydrate]      SURGHX:   Past Surgical History:   Procedure Laterality Date   • APPENDECTOMY  1943   • KNEE ARTHROSCOPY  "     right   • SHOULDER ARTHROSCOPY      right   • TONSILLECTOMY       SOCHX:  reports that she quit smoking about 33 years ago. Her smoking use included Cigarettes. She has a 30.00 pack-year smoking history. She has never used smokeless tobacco. She reports that she does not drink alcohol or use drugs.  FH: Family history was reviewed, no pertinent findings to report/pf    Physical Exam   Constitutional: She is oriented to person, place, and time. No distress.   Thin and frail.      HENT:   Head: Normocephalic and atraumatic.   Right Ear: External ear normal.   Left Ear: External ear normal.   Mouth/Throat: No oropharyngeal exudate.   Membranes slightly dry   Eyes: Pupils are equal, round, and reactive to light. Conjunctivae and EOM are normal.   Neck: Normal range of motion. Neck supple. No tracheal deviation present.   Cardiovascular: Normal rate and regular rhythm.    Pulmonary/Chest: Effort normal and breath sounds normal. No respiratory distress.   Abdominal: Soft. Bowel sounds are normal. There is no tenderness.   Musculoskeletal: Normal range of motion. She exhibits no edema.   Neurological: She is alert and oriented to person, place, and time. Coordination normal.   Skin: Skin is warm. Capillary refill takes less than 2 seconds. No rash noted.   Psychiatric: She has a normal mood and affect. Her behavior is normal. Judgment and thought content normal.   Vitals reviewed.  Good skin turgor.               Assessment/Plan:     1. Loose stools  2. Hypertension, unspecified type  3. Lactose intolerance    At this time patient is very well-appearing without significant concentrated urine or evidence of infection.  Patient had one episode of loose stools this morning of which she only had 2 yesterday.  Patient membranes are slightly dry however I do contribute this to try mouth as patient is on the oxybutynin up which can cause dry mouth.  Patient's blood pressure is controlled today of which I encourage the patient  to possibly take the lisinopril at nighttime if this is causing an upset stomach.  I do not feel that this is the causation however the patient is convinced of such at this time.  I also strongly encouraged diet changes to avoid any lactose products.  Encourage small meals throughout the day and discussed that nail replacement may be an option however lactose cannot be an ingredient nor can artificial sugar as this will cause loose stools as well.  I spent longer than 30 min. Face to face with this patient and family discussing the above .  Patient symptoms today appear to be more driven by recent diet changes rather than infectious processes patient is well-appearing without any abdominal tenderness or hyperactive bowel sounds.  Patient given precautionary s/sx that mandate immediate follow up and evaluation in the ED. Advised of risks of not doing so.    DDX, Supportive care, and indications for immediate follow-up discussed with patient.    Instructed to return to clinic or nearest emergency department if we are not available for any change in condition, further concerns, or worsening of symptoms.    The patient demonstrated a good understanding and agreed with the treatment plan.    Please note that this dictation was created using voice recognition software. I have made every reasonable attempt to correct obvious errors, but I expect that there are errors of grammar and possibly content that I did not discover before finalizing the note.

## 2018-09-05 DIAGNOSIS — R32 URINARY INCONTINENCE, UNSPECIFIED TYPE: ICD-10-CM

## 2018-09-05 RX ORDER — OXYBUTYNIN CHLORIDE 10 MG/1
10 TABLET, EXTENDED RELEASE ORAL DAILY
Qty: 90 TAB | Refills: 3 | Status: SHIPPED | OUTPATIENT
Start: 2018-09-05 | End: 2018-09-20

## 2018-09-05 NOTE — TELEPHONE ENCOUNTER
Was the patient seen in the last year in this department? Yes    Does patient have an active prescription for medications requested? No     Received Request Via: Pharmacy      Pt met protocol?: Yes, OV 7/18

## 2018-09-19 ENCOUNTER — TELEPHONE (OUTPATIENT)
Dept: MEDICAL GROUP | Facility: PHYSICIAN GROUP | Age: 83
End: 2018-09-19

## 2018-09-19 NOTE — TELEPHONE ENCOUNTER
Future Appointments       Provider Department Center    9/20/2018 3:20 PM Monica Rodriguez M.D. [facility]        ESTABLISHED PATIENT PRE-VISIT PLANNING     Note: Patient will not be contacted if there is no indication to call.     1.  Reviewed notes from the last few office visits within the medical group: Yes    2.  If any orders were placed at last visit or intended to be done for this visit (i.e. 6 mos follow-up), do we have Results/Consult Notes?        •  Labs - Labs were not ordered at last office visit.       •  Imaging - Imaging was not ordered at last office visit.       •  Referrals - No referrals were ordered at last office visit.    3. Is this appointment scheduled as a Hospital Follow-Up? No    4.  Immunizations were updated in [facility] using WebIZ?: Yes       •  Web Iz Recommendations: FLU, TDAP and ZOSTAVAX (Shingles)    5.  Patient is due for the following Health Maintenance Topics:   Health Maintenance Due   Topic Date Due   • IMM DTaP/Tdap/Td Vaccine (1 - Tdap) 04/06/1954   • IMM ZOSTER VACCINES (1 of 2) 04/06/1985   • Annual Wellness Visit  05/20/2015   • IMM INFLUENZA (1) 09/01/2018       6.  MDX printed for Provider? NO complaint during visit     7.  Patient was informed to arrive 15 min prior to their scheduled appointment and bring in their medication bottles.

## 2018-09-20 ENCOUNTER — OFFICE VISIT (OUTPATIENT)
Dept: MEDICAL GROUP | Facility: PHYSICIAN GROUP | Age: 83
End: 2018-09-20
Payer: MEDICARE

## 2018-09-20 VITALS
DIASTOLIC BLOOD PRESSURE: 74 MMHG | TEMPERATURE: 98.3 F | HEART RATE: 68 BPM | OXYGEN SATURATION: 96 % | SYSTOLIC BLOOD PRESSURE: 124 MMHG | BODY MASS INDEX: 20.16 KG/M2 | WEIGHT: 121 LBS | HEIGHT: 65 IN

## 2018-09-20 DIAGNOSIS — R64 CACHEXIA (HCC): ICD-10-CM

## 2018-09-20 DIAGNOSIS — R63.4 WEIGHT LOSS, UNINTENTIONAL: ICD-10-CM

## 2018-09-20 DIAGNOSIS — R32 URINARY INCONTINENCE, UNSPECIFIED TYPE: ICD-10-CM

## 2018-09-20 PROCEDURE — 99214 OFFICE O/P EST MOD 30 MIN: CPT | Performed by: INTERNAL MEDICINE

## 2018-09-20 NOTE — ASSESSMENT & PLAN NOTE
This is a chronic health problem that is uncontrolled with current medications and lifestyle measures. Started observing weight loss for past 15 yrs. Stabilized now.

## 2018-09-20 NOTE — PROGRESS NOTES
CC: Follow-up visit, weight loss.    HISTORY OF PRESENT ILLNESS: Patient is a 83 y.o. female established patient who presents today to discuss some medical conditions as mentioned in history of present illness below. Patient had a faulty weighing machine at home which showed that she lost weight and she was worried.     Health Maintenance: Completed    Urinary incontinence  This is a chronic health problem that is uncontrolled with current medications and lifestyle measures. On Oxybutynin which is not helping her but causing side effect of dry mouth and constipation.    Cachexia (HCC)  This is a chronic health problem that is uncontrolled with current medications and lifestyle measures. Started observing weight loss for past 15 yrs. Stabilized now.    Weight loss, unintentional  This is a chronic health problem that is uncontrolled with current medications and lifestyle measures. Has her TSH last in 2011 and 2015.      PHQ score 0, BMI within normal limits, no tobacco, no fall injuries.    Patient Active Problem List    Diagnosis Date Noted   • Cachexia (HCC) 09/20/2018   • Weight loss, unintentional 09/20/2018   • Healthcare maintenance 04/11/2018   • Chronic right-sided low back pain with right-sided sciatica 04/11/2018   • Urinary incontinence 04/11/2018   • Head ache 04/28/2015   • Chest pain 04/28/2015   • Lung nodules    • Torn rotator cuff 07/02/2012   • HTN (hypertension) 05/13/2010   • Dyslipidemia    • OA (osteoarthritis)    • Lactose intolerance       Allergies:Iodine; Aspirin; Clindamycin; Penicillins; Robitussin [guiatuss]; Shellfish allergy; Tetanus toxoid; and Zithromax [azithromycin dihydrate]    Current Outpatient Prescriptions   Medication Sig Dispense Refill   • NON SPECIFIED Please provide Lactose free Protein shakes ( Ensure or Boost ) 20 Each 2   • lisinopril (PRINIVIL) 5 MG Tab Take 1 Tab by mouth every day. 90 Tab 1   • Naproxen Sodium (ALEVE) 220 MG Cap Take 1 Cap by mouth 1 time daily as  needed. 30 Cap 0   • metoprolol SR (TOPROL XL) 25 MG TABLET SR 24 HR Take 1 Tab by mouth every day. 90 Tab 3     No current facility-administered medications for this visit.        Social History   Substance Use Topics   • Smoking status: Former Smoker     Packs/day: 1.00     Years: 30.00     Types: Cigarettes     Quit date: 1/1/1985   • Smokeless tobacco: Never Used      Comment: 1 ppd for 30 years, quite1985   • Alcohol use No     Social History     Social History Narrative   • No narrative on file       Family History   Problem Relation Age of Onset   • Heart Disease Mother    • Heart Disease Father         enlarged heart   • Diabetes Son    • Cancer Maternal Grandmother         suicide    • Cancer Maternal Grandfather    • Cancer Paternal Grandmother         Lung cancer         ROS:     - Constitutional: Positive for unintentional weight loss progressively Negative for fever, chills, and fatigue/generalized weakness.    - HEENT:  Negative for headaches, vision changes, hearing changes, ear pain, ear discharge, rhinorrhea, sinus congestion, sore throat, and neck pain.      - Respiratory: Negative for cough, sputum production, chest congestion, dyspnea, wheezing, and crackles.      - Cardiovascular: Negative for chest pain, palpitations, orthopnea, and bilateral lower extremity edema.     - Gastrointestinal: Negative for heartburn, nausea, vomiting, abdominal pain, hematochezia, melena, diarrhea, constipation, and greasy/foul-smelling stools.     - Genitourinary: Negative for dysuria, polyuria, hematuria, pyuria, urinary urgency, and urinary incontinence.     - Musculoskeletal: Negative for myalgias, back pain, and joint pain.     - Skin: Negative for rash, itching, cyanotic skin color change.     - Neurological: Negative for dizziness, tingling, tremors, focal sensory deficit, focal weakness and headaches.     - Endo/Heme/Allergies: Does not bruise/bleed easily.     - Psychiatric/Behavioral: Negative for  "depression, suicidal/homicidal ideation and memory loss.         Last lab work in November 2017 reviewed and discussed with the patient.    Exam:    Blood pressure 124/74, pulse 68, temperature 36.8 °C (98.3 °F), temperature source Temporal, height 1.651 m (5' 5\"), weight 54.9 kg (121 lb), last menstrual period 05/01/1985, SpO2 96 %. Body mass index is 20.14 kg/m².    General:  Well nourished, well developed female in NAD  Head is grossly normal.  Neck: Supple without JVD or bruit. Thyroid is not enlarged.  Pulmonary: Clear to ausculation and percussion.  Normal effort. No rales, ronchi, or wheezing.  Cardiovascular: Regular rate and rhythm without murmur. Carotid and radial pulses are intact and equal bilaterally.  Extremities: no clubbing, cyanosis, or edema.    Please note that this dictation was created using voice recognition software. I have made every reasonable attempt to correct obvious errors, but I expect that there are errors of grammar and possibly content that I did not discover before finalizing the note.    Assessment/Plan:  1. Cachexia (HCC)  Uncontrolled, though patient has put on around 5 pounds compared to her last visit. This has been ongoing problem for the more than 15 years. Discussed at length with both the patient and the son that there is no focus of any cancers seen. But I will check a prealbumin level and also gave a prescription for provision of lactose free protein shakes to the pharmacy.  - NON SPECIFIED; Please provide Lactose free Protein shakes ( Ensure or Boost )  Dispense: 20 Each; Refill: 2  - PREALBUMIN; Future    2. Urinary incontinence, unspecified type  Uncontrolled, given the side effects of constipation and dry mouth I would discontinue the oxybutynin at this time.    3. Weight loss, unintentional  Given unintentional weight loss, symptoms of severe anxiety I will check for thyroid function test at this time. Last TSH check in 2015 within normal limits.  - TSH WITH REFLEX " TO FT4; Future

## 2018-09-20 NOTE — ASSESSMENT & PLAN NOTE
This is a chronic health problem that is uncontrolled with current medications and lifestyle measures. On Oxybutynin which is not helping her but causing side effect of dry mouth and constipation.

## 2018-09-20 NOTE — ASSESSMENT & PLAN NOTE
This is a chronic health problem that is uncontrolled with current medications and lifestyle measures. Has her TSH last in 2011 and 2015.

## 2018-09-21 ENCOUNTER — HOSPITAL ENCOUNTER (OUTPATIENT)
Dept: LAB | Facility: MEDICAL CENTER | Age: 83
End: 2018-09-21
Attending: INTERNAL MEDICINE
Payer: MEDICARE

## 2018-09-21 DIAGNOSIS — R64 CACHEXIA (HCC): ICD-10-CM

## 2018-09-21 DIAGNOSIS — R63.4 WEIGHT LOSS, UNINTENTIONAL: ICD-10-CM

## 2018-09-21 LAB
PREALB SERPL-MCNC: 13 MG/DL (ref 18–38)
TSH SERPL DL<=0.005 MIU/L-ACNC: 1.08 UIU/ML (ref 0.38–5.33)

## 2018-09-21 PROCEDURE — 84134 ASSAY OF PREALBUMIN: CPT

## 2018-09-21 PROCEDURE — 36415 COLL VENOUS BLD VENIPUNCTURE: CPT

## 2018-09-21 PROCEDURE — 84443 ASSAY THYROID STIM HORMONE: CPT

## 2018-09-23 DIAGNOSIS — R64 CACHEXIA (HCC): ICD-10-CM

## 2018-09-23 DIAGNOSIS — R79.89 LOW SERUM PREALBUMIN: ICD-10-CM

## 2018-09-25 ENCOUNTER — TELEPHONE (OUTPATIENT)
Dept: MEDICAL GROUP | Facility: PHYSICIAN GROUP | Age: 83
End: 2018-09-25

## 2018-09-25 NOTE — TELEPHONE ENCOUNTER
"----- Message from Monica Rodriguez M.D. sent at 9/23/2018 12:02 AM PDT -----  Your Pre albumin levels we checked for Malnutrition is LOW and you will need a strict Nutritional support to improve the prognosis. I have given referral to \" Nutrition specialist \" Please keep up the appointment with them.   Monica Rodriguez M.D.  "

## 2018-09-26 NOTE — TELEPHONE ENCOUNTER
"Patient insurance not covering her \" Nutrition Consult \" referral placed by us. Please call the patient and let her know that she will need to take the high protein shakes  ( Lactose free )  as discussed in her last office visit with me.  Monica Rodriguez M.D.  "

## 2018-11-13 ENCOUNTER — TELEPHONE (OUTPATIENT)
Dept: MEDICAL GROUP | Facility: PHYSICIAN GROUP | Age: 83
End: 2018-11-13

## 2018-11-14 NOTE — TELEPHONE ENCOUNTER
1. Caller Name:Grabiel Hill                                    Call Back Number: [phone]      Patient approves a detailed voicemail message: no    · RTC  paperwork received from Grabiel  requiring provider signature.     · All appropriate fields completed by Medical Assistant: yes - provider entity     · Paperwork placed in MA folder/basket to process.

## 2018-11-15 NOTE — TELEPHONE ENCOUNTER
This paperwork was never discussed with the patient in her last visit in September 2018. Please schedule an appointment for this patient to fill up the paperwork requested.  Monica Rodriguez M.D.

## 2018-11-19 ENCOUNTER — TELEPHONE (OUTPATIENT)
Dept: MEDICAL GROUP | Facility: PHYSICIAN GROUP | Age: 83
End: 2018-11-19

## 2018-11-20 ENCOUNTER — OFFICE VISIT (OUTPATIENT)
Dept: MEDICAL GROUP | Facility: PHYSICIAN GROUP | Age: 83
End: 2018-11-20
Payer: MEDICARE

## 2018-11-20 VITALS
TEMPERATURE: 98.3 F | DIASTOLIC BLOOD PRESSURE: 72 MMHG | BODY MASS INDEX: 19.83 KG/M2 | OXYGEN SATURATION: 92 % | SYSTOLIC BLOOD PRESSURE: 116 MMHG | HEART RATE: 68 BPM | WEIGHT: 119 LBS | HEIGHT: 65 IN

## 2018-11-20 DIAGNOSIS — G89.29 CHRONIC RIGHT-SIDED LOW BACK PAIN WITH RIGHT-SIDED SCIATICA: ICD-10-CM

## 2018-11-20 DIAGNOSIS — M54.41 CHRONIC RIGHT-SIDED LOW BACK PAIN WITH RIGHT-SIDED SCIATICA: ICD-10-CM

## 2018-11-20 DIAGNOSIS — M16.11 OSTEOARTHRITIS OF RIGHT HIP, UNSPECIFIED OSTEOARTHRITIS TYPE: ICD-10-CM

## 2018-11-20 DIAGNOSIS — I10 ESSENTIAL HYPERTENSION: ICD-10-CM

## 2018-11-20 DIAGNOSIS — R64 CACHEXIA (HCC): ICD-10-CM

## 2018-11-20 PROCEDURE — 99214 OFFICE O/P EST MOD 30 MIN: CPT | Performed by: INTERNAL MEDICINE

## 2018-11-20 NOTE — TELEPHONE ENCOUNTER
Future Appointments       Provider Department Center    11/20/2018 5:20 PM Monica Rodriguez M.D. [facility]        ESTABLISHED PATIENT PRE-VISIT PLANNING     Patient was NOT contacted to complete PVP.     Note: Patient will not be contacted if there is no indication to call.     1.  Reviewed notes from the last few office visits within the medical group: Yes    2.  If any orders were placed at last visit or intended to be done for this visit (i.e. 6 mos follow-up), do we have Results/Consult Notes?        •  Labs - Labs ordered, completed on 09/21/18 and results are in chart.   Note: If patient appointment is for lab review and patient did not complete labs, check with provider if OK to reschedule patient until labs completed.       •  Imaging - Imaging was not ordered at last office visit.       •  Referrals - Referral ordered, patient has NOT been seen.    3. Is this appointment scheduled as a Hospital Follow-Up? No    4.  Immunizations were updated in [place] using WebIZ?: Yes       •  Web Iz Recommendations: TDAP and ZOSTAVAX (Shingles)    5.  Patient is due for the following Health Maintenance Topics:   Health Maintenance Due   Topic Date Due   • IMM DTaP/Tdap/Td Vaccine (1 - Tdap) 04/06/1954   • IMM ZOSTER VACCINES (1 of 2) 04/06/1985   • Annual Wellness Visit  05/20/2015       6.  MDX printed for Provider? NO complaint during visit 04/11/18    7.  Patient was informed to arrive 15 min prior to their scheduled appointment and bring in their medication bottles. Confirmed through automated call

## 2018-11-21 NOTE — ASSESSMENT & PLAN NOTE
This is a chronic health problem that is well controlled with current medications and lifestyle measures.  Blood pressure in office today is 1 1 6/72, currently on lisinopril 5 mg daily, metoprolol 25 mg daily.

## 2018-11-21 NOTE — ASSESSMENT & PLAN NOTE
This is a chronic health problem that is uncontrolled with current medications and lifestyle measures.patient has been losing weight progressively, compared to last visit she came down for 2 pounds.  BMI is 19 today.

## 2018-11-21 NOTE — ASSESSMENT & PLAN NOTE
This is a chronic health problem that is uncontrolled with current medications and lifestyle measures.  On as needed Aleve 220 mg daily as needed.

## 2018-11-21 NOTE — PROGRESS NOTES
CC: Follow-up visit, cachexia.  Requesting for filling in her transportation paperwork.    HISTORY OF PRESENT ILLNESS: Patient is a 83 y.o. female established patient who presents today to discuss on medical conditions as mentioned in HPI below.  Filled in all the transportation paperwork and scanned in the patient's chart today.    Health Maintenance: Completed    Chronic right-sided low back pain with right-sided sciatica  This is a chronic health problem that is uncontrolled with current medications and lifestyle measures.  On as needed Aleve 220 mg daily as needed.    HTN (hypertension)  This is a chronic health problem that is well controlled with current medications and lifestyle measures.  Blood pressure in office today is 1 1 6/72, currently on lisinopril 5 mg daily, metoprolol 25 mg daily.    Cachexia (HCC)  This is a chronic health problem that is uncontrolled with current medications and lifestyle measures.patient has been losing weight progressively, compared to last visit she came down for 2 pounds.  BMI is 19 today.      PHQ score 0, BMI decreased to 19.0 compared to the past, former tobacco, no fall injuries.    Patient Active Problem List    Diagnosis Date Noted   • Cachexia (HCC) 09/20/2018   • Weight loss, unintentional 09/20/2018   • Healthcare maintenance 04/11/2018   • Chronic right-sided low back pain with right-sided sciatica 04/11/2018   • Urinary incontinence 04/11/2018   • Head ache 04/28/2015   • Lung nodules    • Torn rotator cuff 07/02/2012   • HTN (hypertension) 05/13/2010   • Dyslipidemia    • OA (osteoarthritis)    • Lactose intolerance       Allergies:Iodine; Aspirin; Clindamycin; Penicillins; Robitussin [guiatuss]; Shellfish allergy; Tetanus toxoid; and Zithromax [azithromycin dihydrate]    Current Outpatient Prescriptions   Medication Sig Dispense Refill   • NON SPECIFIED Please provide Lactose free Protein shakes ( Ensure or Boost ) 20 Each 2   • lisinopril (PRINIVIL) 5 MG Tab  Take 1 Tab by mouth every day. 90 Tab 1   • Naproxen Sodium (ALEVE) 220 MG Cap Take 1 Cap by mouth 1 time daily as needed. 30 Cap 0   • metoprolol SR (TOPROL XL) 25 MG TABLET SR 24 HR Take 1 Tab by mouth every day. 90 Tab 3     No current facility-administered medications for this visit.        Social History   Substance Use Topics   • Smoking status: Former Smoker     Packs/day: 1.00     Years: 30.00     Types: Cigarettes     Quit date: 1/1/1985   • Smokeless tobacco: Never Used      Comment: 1 ppd for 30 years, quite1985   • Alcohol use No     Social History     Social History Narrative   • No narrative on file       Family History   Problem Relation Age of Onset   • Heart Disease Mother    • Heart Disease Father         enlarged heart   • Diabetes Son    • Cancer Maternal Grandmother         suicide    • Cancer Maternal Grandfather    • Cancer Paternal Grandmother         Lung cancer         ROS:     - Constitutional: Positive for cachexia negative for fever, chills, unexpected weight change, and fatigue/generalized weakness.    - HEENT:  Negative for headaches, vision changes, hearing changes, ear pain, ear discharge, rhinorrhea, sinus congestion, sore throat, and neck pain.      - Respiratory: Negative for cough, sputum production, chest congestion, dyspnea, wheezing, and crackles.      - Cardiovascular: Negative for chest pain, palpitations, orthopnea, and bilateral lower extremity edema.     - Gastrointestinal: Negative for heartburn, nausea, vomiting, abdominal pain, hematochezia, melena, diarrhea, constipation, and greasy/foul-smelling stools.     - Genitourinary: Negative for dysuria, polyuria, hematuria, pyuria, urinary urgency, and urinary incontinence.     - Musculoskeletal: Positive for low back pain and osteoarthritis in multiple joints negative for myalgias.     - Skin: Negative for rash, itching, cyanotic skin color change.     - Neurological: Negative for dizziness, tingling, tremors, focal sensory  "deficit, focal weakness and headaches.     - Endo/Heme/Allergies: Does not bruise/bleed easily.     - Psychiatric/Behavioral: Negative for depression, suicidal/homicidal ideation and memory loss.         Last lab work done in September 2018 reviewed and discussed with the patient.    Exam:    Blood pressure 116/72, pulse 68, temperature 36.8 °C (98.3 °F), temperature source Temporal, height 1.651 m (5' 5\"), weight 54 kg (119 lb), last menstrual period 05/01/1985, SpO2 92 %. Body mass index is 19.8 kg/m².    General:  Well nourished, well developed female in NAD  Head is grossly normal.  Neck: Supple without JVD or bruit. Thyroid is not enlarged.  Pulmonary: Clear to ausculation and percussion.  Normal effort. No rales, ronchi, or wheezing.  Cardiovascular: Regular rate and rhythm without murmur. Carotid and radial pulses are intact and equal bilaterally.  Extremities: no clubbing, cyanosis, or edema.    Please note that this dictation was created using voice recognition software. I have made every reasonable attempt to correct obvious errors, but I expect that there are errors of grammar and possibly content that I did not discover before finalizing the note.    Assessment/Plan:  1. Chronic right-sided low back pain with right-sided sciatica  On and off intermittent flareups, continue current Aleve 220 mg daily as needed.    2. Essential hypertension  Well-controlled, continue current lisinopril 5 mg daily, metoprolol 25 mg daily.    3. Osteoarthritis of right hip, unspecified osteoarthritis type  Well-controlled, continue current naproxen 220 mg daily as needed for pain.    4. Cachexia (HCC)  Worsening, her BMI came down to 19 compared to the previous visit.  Given instructions to continue her protein milk which is lactose-free due to her lactose intolerance to take at least twice a day.  The previous prescription given by me for the supplying Ensure was not accepted by her insurance.  Recent prealbumin check was " showing low at 13.

## 2019-01-01 ENCOUNTER — HOME CARE VISIT (OUTPATIENT)
Dept: HOSPICE | Facility: HOSPICE | Age: 84
End: 2019-01-01
Payer: MEDICARE

## 2019-01-01 ENCOUNTER — APPOINTMENT (OUTPATIENT)
Dept: RADIOLOGY | Facility: MEDICAL CENTER | Age: 84
DRG: 445 | End: 2019-01-01
Attending: INTERNAL MEDICINE
Payer: MEDICARE

## 2019-01-01 ENCOUNTER — HOME CARE VISIT (OUTPATIENT)
Dept: HOME HEALTH SERVICES | Facility: HOME HEALTHCARE | Age: 84
End: 2019-01-01
Payer: MEDICARE

## 2019-01-01 ENCOUNTER — APPOINTMENT (OUTPATIENT)
Dept: HOSPICE | Facility: HOSPICE | Age: 84
End: 2019-01-01
Payer: MEDICARE

## 2019-01-01 ENCOUNTER — HOSPITAL ENCOUNTER (EMERGENCY)
Facility: MEDICAL CENTER | Age: 84
End: 2019-02-06
Attending: EMERGENCY MEDICINE
Payer: MEDICARE

## 2019-01-01 ENCOUNTER — ANESTHESIA (OUTPATIENT)
Dept: SURGERY | Facility: MEDICAL CENTER | Age: 84
DRG: 445 | End: 2019-01-01
Payer: MEDICARE

## 2019-01-01 ENCOUNTER — APPOINTMENT (OUTPATIENT)
Dept: RADIOLOGY | Facility: MEDICAL CENTER | Age: 84
DRG: 445 | End: 2019-01-01
Attending: HOSPITALIST
Payer: MEDICARE

## 2019-01-01 ENCOUNTER — ANESTHESIA EVENT (OUTPATIENT)
Dept: SURGERY | Facility: MEDICAL CENTER | Age: 84
DRG: 445 | End: 2019-01-01
Payer: MEDICARE

## 2019-01-01 ENCOUNTER — HOSPITAL ENCOUNTER (OUTPATIENT)
Dept: RADIATION ONCOLOGY | Facility: MEDICAL CENTER | Age: 84
End: 2019-09-30
Attending: RADIOLOGY
Payer: MEDICARE

## 2019-01-01 ENCOUNTER — APPOINTMENT (OUTPATIENT)
Dept: RADIOLOGY | Facility: MEDICAL CENTER | Age: 84
DRG: 445 | End: 2019-01-01
Attending: EMERGENCY MEDICINE
Payer: MEDICARE

## 2019-01-01 ENCOUNTER — HOSPITAL ENCOUNTER (OUTPATIENT)
Facility: MEDICAL CENTER | Age: 84
End: 2019-12-06
Attending: INTERNAL MEDICINE
Payer: COMMERCIAL

## 2019-01-01 ENCOUNTER — APPOINTMENT (OUTPATIENT)
Dept: RADIOLOGY | Facility: MEDICAL CENTER | Age: 84
End: 2019-01-01
Attending: EMERGENCY MEDICINE
Payer: MEDICARE

## 2019-01-01 ENCOUNTER — TELEPHONE (OUTPATIENT)
Dept: MEDICAL GROUP | Facility: PHYSICIAN GROUP | Age: 84
End: 2019-01-01

## 2019-01-01 ENCOUNTER — HOSPITAL ENCOUNTER (EMERGENCY)
Facility: MEDICAL CENTER | Age: 84
End: 2019-08-26
Attending: EMERGENCY MEDICINE
Payer: MEDICARE

## 2019-01-01 ENCOUNTER — HOSPICE ADMISSION (OUTPATIENT)
Dept: HOSPICE | Facility: HOSPICE | Age: 84
End: 2019-01-01
Payer: MEDICARE

## 2019-01-01 ENCOUNTER — OFFICE VISIT (OUTPATIENT)
Dept: MEDICAL GROUP | Facility: PHYSICIAN GROUP | Age: 84
End: 2019-01-01
Payer: MEDICARE

## 2019-01-01 ENCOUNTER — PATIENT OUTREACH (OUTPATIENT)
Dept: HEALTH INFORMATION MANAGEMENT | Facility: OTHER | Age: 84
End: 2019-01-01

## 2019-01-01 ENCOUNTER — APPOINTMENT (OUTPATIENT)
Dept: RADIOLOGY | Facility: MEDICAL CENTER | Age: 84
End: 2019-01-01
Attending: INTERNAL MEDICINE
Payer: COMMERCIAL

## 2019-01-01 ENCOUNTER — PATIENT OUTREACH (OUTPATIENT)
Dept: OTHER | Facility: MEDICAL CENTER | Age: 84
End: 2019-01-01

## 2019-01-01 ENCOUNTER — HOME HEALTH ADMISSION (OUTPATIENT)
Dept: HOME HEALTH SERVICES | Facility: HOME HEALTHCARE | Age: 84
End: 2019-01-01
Payer: MEDICARE

## 2019-01-01 ENCOUNTER — HOSPITAL ENCOUNTER (OUTPATIENT)
Facility: MEDICAL CENTER | Age: 84
End: 2019-07-10
Attending: INTERNAL MEDICINE | Admitting: INTERNAL MEDICINE
Payer: COMMERCIAL

## 2019-01-01 ENCOUNTER — HOSPITAL ENCOUNTER (OUTPATIENT)
Dept: RADIOLOGY | Facility: MEDICAL CENTER | Age: 84
End: 2019-07-26
Attending: FAMILY MEDICINE
Payer: MEDICARE

## 2019-01-01 ENCOUNTER — DOCUMENTATION (OUTPATIENT)
Dept: HEMATOLOGY ONCOLOGY | Facility: MEDICAL CENTER | Age: 84
End: 2019-01-01

## 2019-01-01 ENCOUNTER — HOSPITAL ENCOUNTER (OUTPATIENT)
Dept: RADIOLOGY | Facility: MEDICAL CENTER | Age: 84
End: 2019-09-17
Attending: RADIOLOGY
Payer: MEDICARE

## 2019-01-01 ENCOUNTER — ANESTHESIA EVENT (OUTPATIENT)
Dept: SURGERY | Facility: MEDICAL CENTER | Age: 84
End: 2019-01-01
Payer: COMMERCIAL

## 2019-01-01 ENCOUNTER — HOSPITAL ENCOUNTER (OUTPATIENT)
Facility: MEDICAL CENTER | Age: 84
End: 2019-05-15
Attending: FAMILY MEDICINE
Payer: MEDICARE

## 2019-01-01 ENCOUNTER — HOSPITAL ENCOUNTER (INPATIENT)
Facility: MEDICAL CENTER | Age: 84
LOS: 5 days | DRG: 445 | End: 2019-07-15
Attending: INTERNAL MEDICINE | Admitting: INTERNAL MEDICINE
Payer: MEDICARE

## 2019-01-01 ENCOUNTER — NON-PROVIDER VISIT (OUTPATIENT)
Dept: MEDICAL GROUP | Facility: PHYSICIAN GROUP | Age: 84
End: 2019-01-01
Payer: MEDICARE

## 2019-01-01 ENCOUNTER — TELEPHONE (OUTPATIENT)
Dept: MEDICAL GROUP | Facility: MEDICAL CENTER | Age: 84
End: 2019-01-01

## 2019-01-01 ENCOUNTER — ANESTHESIA (OUTPATIENT)
Dept: SURGERY | Facility: MEDICAL CENTER | Age: 84
End: 2019-01-01
Payer: COMMERCIAL

## 2019-01-01 ENCOUNTER — ANTICOAGULATION MONITORING (OUTPATIENT)
Dept: MEDICAL GROUP | Facility: PHYSICIAN GROUP | Age: 84
End: 2019-01-01

## 2019-01-01 ENCOUNTER — HOSPITAL ENCOUNTER (INPATIENT)
Facility: MEDICAL CENTER | Age: 84
LOS: 8 days | DRG: 445 | End: 2019-07-10
Attending: EMERGENCY MEDICINE | Admitting: HOSPITALIST
Payer: MEDICARE

## 2019-01-01 ENCOUNTER — HOSPITAL ENCOUNTER (EMERGENCY)
Facility: MEDICAL CENTER | Age: 84
End: 2019-05-08
Attending: EMERGENCY MEDICINE
Payer: MEDICARE

## 2019-01-01 VITALS — RESPIRATION RATE: 20 BRPM | DIASTOLIC BLOOD PRESSURE: 65 MMHG | SYSTOLIC BLOOD PRESSURE: 122 MMHG | HEART RATE: 66 BPM

## 2019-01-01 VITALS
SYSTOLIC BLOOD PRESSURE: 119 MMHG | HEART RATE: 96 BPM | WEIGHT: 113 LBS | BODY MASS INDEX: 18.8 KG/M2 | DIASTOLIC BLOOD PRESSURE: 84 MMHG | RESPIRATION RATE: 26 BRPM

## 2019-01-01 VITALS
HEART RATE: 90 BPM | BODY MASS INDEX: 18.3 KG/M2 | SYSTOLIC BLOOD PRESSURE: 120 MMHG | WEIGHT: 110 LBS | RESPIRATION RATE: 20 BRPM | DIASTOLIC BLOOD PRESSURE: 71 MMHG

## 2019-01-01 VITALS
HEIGHT: 65 IN | SYSTOLIC BLOOD PRESSURE: 158 MMHG | TEMPERATURE: 98 F | OXYGEN SATURATION: 96 % | HEART RATE: 81 BPM | DIASTOLIC BLOOD PRESSURE: 57 MMHG | BODY MASS INDEX: 19.57 KG/M2 | WEIGHT: 117.48 LBS

## 2019-01-01 VITALS
RESPIRATION RATE: 16 BRPM | HEART RATE: 73 BPM | TEMPERATURE: 96.9 F | DIASTOLIC BLOOD PRESSURE: 74 MMHG | OXYGEN SATURATION: 98 % | SYSTOLIC BLOOD PRESSURE: 122 MMHG

## 2019-01-01 VITALS
HEART RATE: 79 BPM | SYSTOLIC BLOOD PRESSURE: 158 MMHG | HEIGHT: 65 IN | WEIGHT: 116.84 LBS | DIASTOLIC BLOOD PRESSURE: 85 MMHG | OXYGEN SATURATION: 96 % | BODY MASS INDEX: 19.47 KG/M2 | RESPIRATION RATE: 15 BRPM | TEMPERATURE: 97.9 F

## 2019-01-01 VITALS
OXYGEN SATURATION: 95 % | HEART RATE: 68 BPM | TEMPERATURE: 97.6 F | BODY MASS INDEX: 22.32 KG/M2 | WEIGHT: 126 LBS | HEIGHT: 63 IN | SYSTOLIC BLOOD PRESSURE: 116 MMHG | DIASTOLIC BLOOD PRESSURE: 56 MMHG

## 2019-01-01 VITALS
SYSTOLIC BLOOD PRESSURE: 152 MMHG | OXYGEN SATURATION: 98 % | WEIGHT: 121.03 LBS | TEMPERATURE: 97.8 F | HEIGHT: 65 IN | HEART RATE: 73 BPM | BODY MASS INDEX: 20.17 KG/M2 | RESPIRATION RATE: 17 BRPM | DIASTOLIC BLOOD PRESSURE: 63 MMHG

## 2019-01-01 VITALS
OXYGEN SATURATION: 98 % | HEART RATE: 97 BPM | DIASTOLIC BLOOD PRESSURE: 82 MMHG | SYSTOLIC BLOOD PRESSURE: 138 MMHG | RESPIRATION RATE: 16 BRPM | HEART RATE: 94 BPM | RESPIRATION RATE: 28 BRPM | SYSTOLIC BLOOD PRESSURE: 120 MMHG | TEMPERATURE: 96.8 F | DIASTOLIC BLOOD PRESSURE: 72 MMHG

## 2019-01-01 VITALS
SYSTOLIC BLOOD PRESSURE: 172 MMHG | OXYGEN SATURATION: 93 % | TEMPERATURE: 97.2 F | WEIGHT: 110.23 LBS | BODY MASS INDEX: 18.34 KG/M2 | HEART RATE: 75 BPM | DIASTOLIC BLOOD PRESSURE: 85 MMHG | RESPIRATION RATE: 16 BRPM

## 2019-01-01 VITALS
SYSTOLIC BLOOD PRESSURE: 128 MMHG | HEART RATE: 99 BPM | TEMPERATURE: 97.6 F | BODY MASS INDEX: 18.3 KG/M2 | WEIGHT: 110 LBS | OXYGEN SATURATION: 97 % | DIASTOLIC BLOOD PRESSURE: 75 MMHG

## 2019-01-01 VITALS
HEART RATE: 92 BPM | RESPIRATION RATE: 16 BRPM | SYSTOLIC BLOOD PRESSURE: 125 MMHG | DIASTOLIC BLOOD PRESSURE: 63 MMHG | TEMPERATURE: 99.3 F | BODY MASS INDEX: 19.4 KG/M2 | OXYGEN SATURATION: 95 % | WEIGHT: 116.6 LBS

## 2019-01-01 VITALS
DIASTOLIC BLOOD PRESSURE: 61 MMHG | SYSTOLIC BLOOD PRESSURE: 111 MMHG | HEART RATE: 82 BPM | BODY MASS INDEX: 19.22 KG/M2 | RESPIRATION RATE: 20 BRPM | WEIGHT: 115.5 LBS

## 2019-01-01 VITALS
TEMPERATURE: 98.4 F | RESPIRATION RATE: 16 BRPM | HEART RATE: 84 BPM | BODY MASS INDEX: 21.97 KG/M2 | DIASTOLIC BLOOD PRESSURE: 68 MMHG | WEIGHT: 132 LBS | SYSTOLIC BLOOD PRESSURE: 157 MMHG | OXYGEN SATURATION: 100 %

## 2019-01-01 VITALS
OXYGEN SATURATION: 97 % | HEART RATE: 97 BPM | RESPIRATION RATE: 16 BRPM | TEMPERATURE: 99.3 F | SYSTOLIC BLOOD PRESSURE: 120 MMHG | DIASTOLIC BLOOD PRESSURE: 65 MMHG

## 2019-01-01 VITALS
SYSTOLIC BLOOD PRESSURE: 120 MMHG | TEMPERATURE: 98.7 F | BODY MASS INDEX: 18.8 KG/M2 | DIASTOLIC BLOOD PRESSURE: 66 MMHG | WEIGHT: 113 LBS | OXYGEN SATURATION: 98 % | HEART RATE: 88 BPM | RESPIRATION RATE: 16 BRPM

## 2019-01-01 VITALS
DIASTOLIC BLOOD PRESSURE: 78 MMHG | BODY MASS INDEX: 20.14 KG/M2 | SYSTOLIC BLOOD PRESSURE: 122 MMHG | RESPIRATION RATE: 20 BRPM | HEART RATE: 76 BPM | WEIGHT: 121 LBS

## 2019-01-01 VITALS — HEART RATE: 100 BPM | DIASTOLIC BLOOD PRESSURE: 73 MMHG | RESPIRATION RATE: 20 BRPM | SYSTOLIC BLOOD PRESSURE: 132 MMHG

## 2019-01-01 VITALS — SYSTOLIC BLOOD PRESSURE: 100 MMHG | HEART RATE: 99 BPM | RESPIRATION RATE: 17 BRPM | DIASTOLIC BLOOD PRESSURE: 68 MMHG

## 2019-01-01 VITALS — HEART RATE: 89 BPM | DIASTOLIC BLOOD PRESSURE: 72 MMHG | RESPIRATION RATE: 20 BRPM | SYSTOLIC BLOOD PRESSURE: 118 MMHG

## 2019-01-01 VITALS
HEART RATE: 66 BPM | SYSTOLIC BLOOD PRESSURE: 112 MMHG | WEIGHT: 125.66 LBS | DIASTOLIC BLOOD PRESSURE: 74 MMHG | RESPIRATION RATE: 18 BRPM | TEMPERATURE: 97.3 F | BODY MASS INDEX: 22.27 KG/M2 | OXYGEN SATURATION: 97 % | HEIGHT: 63 IN

## 2019-01-01 VITALS — RESPIRATION RATE: 16 BRPM | SYSTOLIC BLOOD PRESSURE: 120 MMHG | HEART RATE: 82 BPM | DIASTOLIC BLOOD PRESSURE: 63 MMHG

## 2019-01-01 VITALS
DIASTOLIC BLOOD PRESSURE: 76 MMHG | BODY MASS INDEX: 19.34 KG/M2 | RESPIRATION RATE: 16 BRPM | HEART RATE: 93 BPM | SYSTOLIC BLOOD PRESSURE: 122 MMHG | OXYGEN SATURATION: 95 % | WEIGHT: 116.2 LBS | TEMPERATURE: 99.2 F

## 2019-01-01 VITALS
WEIGHT: 110 LBS | HEIGHT: 65 IN | RESPIRATION RATE: 18 BRPM | OXYGEN SATURATION: 96 % | DIASTOLIC BLOOD PRESSURE: 72 MMHG | BODY MASS INDEX: 18.33 KG/M2 | SYSTOLIC BLOOD PRESSURE: 132 MMHG | TEMPERATURE: 98.6 F | HEART RATE: 81 BPM

## 2019-01-01 VITALS
RESPIRATION RATE: 16 BRPM | WEIGHT: 118 LBS | BODY MASS INDEX: 19.64 KG/M2 | HEART RATE: 63 BPM | DIASTOLIC BLOOD PRESSURE: 52 MMHG | SYSTOLIC BLOOD PRESSURE: 100 MMHG

## 2019-01-01 VITALS
WEIGHT: 120.81 LBS | TEMPERATURE: 98.9 F | OXYGEN SATURATION: 97 % | RESPIRATION RATE: 16 BRPM | HEIGHT: 65 IN | HEART RATE: 78 BPM | BODY MASS INDEX: 20.13 KG/M2 | SYSTOLIC BLOOD PRESSURE: 129 MMHG | DIASTOLIC BLOOD PRESSURE: 74 MMHG

## 2019-01-01 VITALS
RESPIRATION RATE: 17 BRPM | SYSTOLIC BLOOD PRESSURE: 120 MMHG | OXYGEN SATURATION: 92 % | HEART RATE: 62 BPM | DIASTOLIC BLOOD PRESSURE: 63 MMHG | TEMPERATURE: 97.5 F

## 2019-01-01 VITALS — RESPIRATION RATE: 18 BRPM | DIASTOLIC BLOOD PRESSURE: 60 MMHG | SYSTOLIC BLOOD PRESSURE: 110 MMHG | HEART RATE: 80 BPM

## 2019-01-01 VITALS — SYSTOLIC BLOOD PRESSURE: 119 MMHG | HEART RATE: 82 BPM | DIASTOLIC BLOOD PRESSURE: 71 MMHG | RESPIRATION RATE: 16 BRPM

## 2019-01-01 VITALS — HEART RATE: 78 BPM | DIASTOLIC BLOOD PRESSURE: 70 MMHG | SYSTOLIC BLOOD PRESSURE: 138 MMHG | RESPIRATION RATE: 18 BRPM

## 2019-01-01 VITALS — SYSTOLIC BLOOD PRESSURE: 138 MMHG | DIASTOLIC BLOOD PRESSURE: 72 MMHG | RESPIRATION RATE: 24 BRPM | HEART RATE: 84 BPM

## 2019-01-01 VITALS — DIASTOLIC BLOOD PRESSURE: 80 MMHG | SYSTOLIC BLOOD PRESSURE: 130 MMHG

## 2019-01-01 VITALS — RESPIRATION RATE: 20 BRPM | DIASTOLIC BLOOD PRESSURE: 61 MMHG | HEART RATE: 90 BPM | SYSTOLIC BLOOD PRESSURE: 122 MMHG

## 2019-01-01 VITALS
TEMPERATURE: 98.4 F | RESPIRATION RATE: 18 BRPM | DIASTOLIC BLOOD PRESSURE: 60 MMHG | OXYGEN SATURATION: 97 % | SYSTOLIC BLOOD PRESSURE: 120 MMHG | HEART RATE: 72 BPM

## 2019-01-01 VITALS — RESPIRATION RATE: 24 BRPM | HEART RATE: 104 BPM

## 2019-01-01 DIAGNOSIS — K83.1 OBSTRUCTIVE JAUNDICE: ICD-10-CM

## 2019-01-01 DIAGNOSIS — R10.32 INTERMITTENT LEFT LOWER QUADRANT ABDOMINAL PAIN: ICD-10-CM

## 2019-01-01 DIAGNOSIS — E78.5 DYSLIPIDEMIA: ICD-10-CM

## 2019-01-01 DIAGNOSIS — Z87.891 PERSONAL HISTORY OF SMOKING: ICD-10-CM

## 2019-01-01 DIAGNOSIS — R06.00 DYSPNEA, UNSPECIFIED TYPE: ICD-10-CM

## 2019-01-01 DIAGNOSIS — D49.89 NEOPLASM OF UNSPECIFIED BEHAVIOR OF OTHER SPECIFIED SITES: ICD-10-CM

## 2019-01-01 DIAGNOSIS — Z12.11 SCREENING FOR COLON CANCER: ICD-10-CM
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
## 2019-01-01 DIAGNOSIS — C25.9 MALIGNANT NEOPLASM OF PANCREAS, UNSPECIFIED LOCATION OF MALIGNANCY (HCC): ICD-10-CM

## 2019-01-01 DIAGNOSIS — C25.0 MALIGNANT NEOPLASM OF HEAD OF PANCREAS (HCC): ICD-10-CM

## 2019-01-01 DIAGNOSIS — I10 ESSENTIAL HYPERTENSION: ICD-10-CM

## 2019-01-01 DIAGNOSIS — G89.29 CHRONIC RIGHT-SIDED LOW BACK PAIN WITH RIGHT-SIDED SCIATICA: ICD-10-CM

## 2019-01-01 DIAGNOSIS — M16.10 HIP ARTHRITIS: ICD-10-CM

## 2019-01-01 DIAGNOSIS — J43.8 OTHER EMPHYSEMA (HCC): ICD-10-CM

## 2019-01-01 DIAGNOSIS — R17 JAUNDICE: ICD-10-CM

## 2019-01-01 DIAGNOSIS — C25.1 MALIGNANT NEOPLASM OF BODY OF PANCREAS (HCC): ICD-10-CM

## 2019-01-01 DIAGNOSIS — M54.41 CHRONIC RIGHT-SIDED LOW BACK PAIN WITH RIGHT-SIDED SCIATICA: ICD-10-CM

## 2019-01-01 DIAGNOSIS — R63.4 WEIGHT LOSS, UNINTENTIONAL: ICD-10-CM

## 2019-01-01 DIAGNOSIS — R63.0 POOR APPETITE: ICD-10-CM

## 2019-01-01 DIAGNOSIS — J40 BRONCHITIS: ICD-10-CM

## 2019-01-01 DIAGNOSIS — K86.89 PANCREATIC MASS: ICD-10-CM

## 2019-01-01 DIAGNOSIS — R19.7 DIARRHEA, UNSPECIFIED TYPE: ICD-10-CM

## 2019-01-01 DIAGNOSIS — B34.9 VIRAL SYNDROME: ICD-10-CM

## 2019-01-01 DIAGNOSIS — N39.0 URINARY TRACT INFECTION WITHOUT HEMATURIA, SITE UNSPECIFIED: ICD-10-CM

## 2019-01-01 DIAGNOSIS — S80.01XA CONTUSION OF RIGHT KNEE, INITIAL ENCOUNTER: ICD-10-CM

## 2019-01-01 LAB
ALBUMIN SERPL BCP-MCNC: 3.1 G/DL (ref 3.2–4.9)
ALBUMIN SERPL BCP-MCNC: 3.3 G/DL (ref 3.2–4.9)
ALBUMIN SERPL BCP-MCNC: 3.4 G/DL (ref 3.2–4.9)
ALBUMIN SERPL BCP-MCNC: 3.4 G/DL (ref 3.2–4.9)
ALBUMIN SERPL BCP-MCNC: 3.5 G/DL (ref 3.2–4.9)
ALBUMIN SERPL BCP-MCNC: 3.5 G/DL (ref 3.2–4.9)
ALBUMIN SERPL BCP-MCNC: 3.6 G/DL (ref 3.2–4.9)
ALBUMIN SERPL BCP-MCNC: 3.6 G/DL (ref 3.2–4.9)
ALBUMIN SERPL BCP-MCNC: 3.7 G/DL (ref 3.2–4.9)
ALBUMIN SERPL BCP-MCNC: 3.8 G/DL (ref 3.2–4.9)
ALBUMIN SERPL BCP-MCNC: 4.3 G/DL (ref 3.2–4.9)
ALBUMIN SERPL BCP-MCNC: 4.3 G/DL (ref 3.2–4.9)
ALBUMIN SERPL BCP-MCNC: 4.4 G/DL (ref 3.2–4.9)
ALBUMIN/GLOB SERPL: 1.3 G/DL
ALBUMIN/GLOB SERPL: 1.4 G/DL
ALBUMIN/GLOB SERPL: 1.5 G/DL
ALBUMIN/GLOB SERPL: 1.6 G/DL
ALBUMIN/GLOB SERPL: 1.7 G/DL
ALP SERPL-CCNC: 188 U/L (ref 30–99)
ALP SERPL-CCNC: 205 U/L (ref 30–99)
ALP SERPL-CCNC: 210 U/L (ref 30–99)
ALP SERPL-CCNC: 229 U/L (ref 30–99)
ALP SERPL-CCNC: 232 U/L (ref 30–99)
ALP SERPL-CCNC: 294 U/L (ref 30–99)
ALP SERPL-CCNC: 341 U/L (ref 30–99)
ALP SERPL-CCNC: 360 U/L (ref 30–99)
ALP SERPL-CCNC: 384 U/L (ref 30–99)
ALP SERPL-CCNC: 390 U/L (ref 30–99)
ALP SERPL-CCNC: 505 U/L (ref 30–99)
ALP SERPL-CCNC: 73 U/L (ref 30–99)
ALP SERPL-CCNC: 88 U/L (ref 30–99)
ALT SERPL-CCNC: 12 U/L (ref 2–50)
ALT SERPL-CCNC: 122 U/L (ref 2–50)
ALT SERPL-CCNC: 144 U/L (ref 2–50)
ALT SERPL-CCNC: 149 U/L (ref 2–50)
ALT SERPL-CCNC: 15 U/L (ref 2–50)
ALT SERPL-CCNC: 201 U/L (ref 2–50)
ALT SERPL-CCNC: 227 U/L (ref 2–50)
ALT SERPL-CCNC: 283 U/L (ref 2–50)
ALT SERPL-CCNC: 55 U/L (ref 2–50)
ALT SERPL-CCNC: 62 U/L (ref 2–50)
ALT SERPL-CCNC: 71 U/L (ref 2–50)
ALT SERPL-CCNC: 81 U/L (ref 2–50)
ALT SERPL-CCNC: 91 U/L (ref 2–50)
AMORPH CRY #/AREA URNS HPF: PRESENT /HPF
ANION GAP SERPL CALC-SCNC: 10 MMOL/L (ref 0–11.9)
ANION GAP SERPL CALC-SCNC: 10 MMOL/L (ref 0–11.9)
ANION GAP SERPL CALC-SCNC: 3 MMOL/L (ref 0–11.9)
ANION GAP SERPL CALC-SCNC: 4 MMOL/L (ref 0–11.9)
ANION GAP SERPL CALC-SCNC: 4 MMOL/L (ref 0–11.9)
ANION GAP SERPL CALC-SCNC: 5 MMOL/L (ref 0–11.9)
ANION GAP SERPL CALC-SCNC: 6 MMOL/L (ref 0–11.9)
ANION GAP SERPL CALC-SCNC: 7 MMOL/L (ref 0–11.9)
ANION GAP SERPL CALC-SCNC: 7 MMOL/L (ref 0–11.9)
APPEARANCE UR: CLEAR
APTT PPP: 25.9 SEC (ref 24.7–36)
AST SERPL-CCNC: 112 U/L (ref 12–45)
AST SERPL-CCNC: 153 U/L (ref 12–45)
AST SERPL-CCNC: 188 U/L (ref 12–45)
AST SERPL-CCNC: 19 U/L (ref 12–45)
AST SERPL-CCNC: 22 U/L (ref 12–45)
AST SERPL-CCNC: 237 U/L (ref 12–45)
AST SERPL-CCNC: 31 U/L (ref 12–45)
AST SERPL-CCNC: 31 U/L (ref 12–45)
AST SERPL-CCNC: 37 U/L (ref 12–45)
AST SERPL-CCNC: 39 U/L (ref 12–45)
AST SERPL-CCNC: 48 U/L (ref 12–45)
AST SERPL-CCNC: 67 U/L (ref 12–45)
AST SERPL-CCNC: 92 U/L (ref 12–45)
BACTERIA #/AREA URNS HPF: NEGATIVE /HPF
BACTERIA #/AREA URNS HPF: NEGATIVE /HPF
BACTERIA FLD AEROBE CULT: ABNORMAL
BACTERIA FLD AEROBE CULT: NORMAL
BACTERIA UR CULT: ABNORMAL
BACTERIA UR CULT: ABNORMAL
BASOPHILS # BLD AUTO: 0.1 % (ref 0–1.8)
BASOPHILS # BLD AUTO: 0.1 % (ref 0–1.8)
BASOPHILS # BLD AUTO: 0.3 % (ref 0–1.8)
BASOPHILS # BLD AUTO: 0.3 % (ref 0–1.8)
BASOPHILS # BLD AUTO: 0.6 % (ref 0–1.8)
BASOPHILS # BLD AUTO: 0.6 % (ref 0–1.8)
BASOPHILS # BLD: 0.01 K/UL (ref 0–0.12)
BASOPHILS # BLD: 0.01 K/UL (ref 0–0.12)
BASOPHILS # BLD: 0.02 K/UL (ref 0–0.12)
BASOPHILS # BLD: 0.02 K/UL (ref 0–0.12)
BASOPHILS # BLD: 0.03 K/UL (ref 0–0.12)
BASOPHILS # BLD: 0.03 K/UL (ref 0–0.12)
BILIRUB CONJ SERPL-MCNC: 2.5 MG/DL (ref 0.1–0.5)
BILIRUB CONJ SERPL-MCNC: 2.8 MG/DL (ref 0.1–0.5)
BILIRUB INDIRECT SERPL-MCNC: 2.3 MG/DL (ref 0–1)
BILIRUB INDIRECT SERPL-MCNC: 2.8 MG/DL (ref 0–1)
BILIRUB SERPL-MCNC: 0.6 MG/DL (ref 0.1–1.5)
BILIRUB SERPL-MCNC: 1.2 MG/DL (ref 0.1–1.5)
BILIRUB SERPL-MCNC: 14 MG/DL (ref 0.1–1.5)
BILIRUB SERPL-MCNC: 14.5 MG/DL (ref 0.1–1.5)
BILIRUB SERPL-MCNC: 14.7 MG/DL (ref 0.1–1.5)
BILIRUB SERPL-MCNC: 3.9 MG/DL (ref 0.1–1.5)
BILIRUB SERPL-MCNC: 4.1 MG/DL (ref 0.1–1.5)
BILIRUB SERPL-MCNC: 4.4 MG/DL (ref 0.1–1.5)
BILIRUB SERPL-MCNC: 5.1 MG/DL (ref 0.1–1.5)
BILIRUB SERPL-MCNC: 5.3 MG/DL (ref 0.1–1.5)
BILIRUB SERPL-MCNC: 6.2 MG/DL (ref 0.1–1.5)
BILIRUB SERPL-MCNC: 7.2 MG/DL (ref 0.1–1.5)
BILIRUB SERPL-MCNC: 9.1 MG/DL (ref 0.1–1.5)
BILIRUB UR QL STRIP.AUTO: ABNORMAL
BILIRUB UR QL STRIP.AUTO: NEGATIVE
BILIRUB UR QL STRIP.AUTO: NEGATIVE
BUN SERPL-MCNC: 16 MG/DL (ref 8–22)
BUN SERPL-MCNC: 18 MG/DL (ref 8–22)
BUN SERPL-MCNC: 19 MG/DL (ref 8–22)
BUN SERPL-MCNC: 20 MG/DL (ref 8–22)
BUN SERPL-MCNC: 22 MG/DL (ref 8–22)
BUN SERPL-MCNC: 24 MG/DL (ref 8–22)
BUN SERPL-MCNC: 24 MG/DL (ref 8–22)
BUN SERPL-MCNC: 29 MG/DL (ref 8–22)
BUN SERPL-MCNC: 29 MG/DL (ref 8–22)
CALCIUM SERPL-MCNC: 10.2 MG/DL (ref 8.5–10.5)
CALCIUM SERPL-MCNC: 10.9 MG/DL (ref 8.5–10.5)
CALCIUM SERPL-MCNC: 9.2 MG/DL (ref 8.5–10.5)
CALCIUM SERPL-MCNC: 9.6 MG/DL (ref 8.5–10.5)
CALCIUM SERPL-MCNC: 9.6 MG/DL (ref 8.5–10.5)
CALCIUM SERPL-MCNC: 9.7 MG/DL (ref 8.5–10.5)
CALCIUM SERPL-MCNC: 9.8 MG/DL (ref 8.5–10.5)
CALCIUM SERPL-MCNC: 9.8 MG/DL (ref 8.5–10.5)
CALCIUM SERPL-MCNC: 9.9 MG/DL (ref 8.5–10.5)
CANCER AG19-9 SERPL-ACNC: 849.3 U/ML (ref 0–35)
CHLORIDE SERPL-SCNC: 100 MMOL/L (ref 96–112)
CHLORIDE SERPL-SCNC: 101 MMOL/L (ref 96–112)
CHLORIDE SERPL-SCNC: 103 MMOL/L (ref 96–112)
CHLORIDE SERPL-SCNC: 105 MMOL/L (ref 96–112)
CHLORIDE SERPL-SCNC: 98 MMOL/L (ref 96–112)
CHLORIDE SERPL-SCNC: 99 MMOL/L (ref 96–112)
CHLORIDE SERPL-SCNC: 99 MMOL/L (ref 96–112)
CHOLEST SERPL-MCNC: 174 MG/DL (ref 100–199)
CO2 SERPL-SCNC: 26 MMOL/L (ref 20–33)
CO2 SERPL-SCNC: 28 MMOL/L (ref 20–33)
CO2 SERPL-SCNC: 29 MMOL/L (ref 20–33)
CO2 SERPL-SCNC: 30 MMOL/L (ref 20–33)
CO2 SERPL-SCNC: 32 MMOL/L (ref 20–33)
CO2 SERPL-SCNC: 32 MMOL/L (ref 20–33)
CO2 SERPL-SCNC: 33 MMOL/L (ref 20–33)
CO2 SERPL-SCNC: 34 MMOL/L (ref 20–33)
CO2 SERPL-SCNC: 34 MMOL/L (ref 20–33)
CO2 SERPL-SCNC: 35 MMOL/L (ref 20–33)
CO2 SERPL-SCNC: 36 MMOL/L (ref 20–33)
COLOR UR: ABNORMAL
COLOR UR: YELLOW
COLOR UR: YELLOW
CREAT SERPL-MCNC: 0.46 MG/DL (ref 0.5–1.4)
CREAT SERPL-MCNC: 0.5 MG/DL (ref 0.5–1.4)
CREAT SERPL-MCNC: 0.55 MG/DL (ref 0.5–1.4)
CREAT SERPL-MCNC: 0.58 MG/DL (ref 0.5–1.4)
CREAT SERPL-MCNC: 0.58 MG/DL (ref 0.5–1.4)
CREAT SERPL-MCNC: 0.61 MG/DL (ref 0.5–1.4)
CREAT SERPL-MCNC: 0.62 MG/DL (ref 0.5–1.4)
CREAT SERPL-MCNC: 0.63 MG/DL (ref 0.5–1.4)
CREAT SERPL-MCNC: 0.64 MG/DL (ref 0.5–1.4)
CREAT SERPL-MCNC: 0.68 MG/DL (ref 0.5–1.4)
CREAT SERPL-MCNC: 0.78 MG/DL (ref 0.5–1.4)
EKG IMPRESSION: NORMAL
EKG IMPRESSION: NORMAL
EOSINOPHIL # BLD AUTO: 0 K/UL (ref 0–0.51)
EOSINOPHIL # BLD AUTO: 0.01 K/UL (ref 0–0.51)
EOSINOPHIL # BLD AUTO: 0.03 K/UL (ref 0–0.51)
EOSINOPHIL # BLD AUTO: 0.04 K/UL (ref 0–0.51)
EOSINOPHIL # BLD AUTO: 0.12 K/UL (ref 0–0.51)
EOSINOPHIL # BLD AUTO: 0.16 K/UL (ref 0–0.51)
EOSINOPHIL NFR BLD: 0 % (ref 0–6.9)
EOSINOPHIL NFR BLD: 0.1 % (ref 0–6.9)
EOSINOPHIL NFR BLD: 0.5 % (ref 0–6.9)
EOSINOPHIL NFR BLD: 0.6 % (ref 0–6.9)
EOSINOPHIL NFR BLD: 2.4 % (ref 0–6.9)
EOSINOPHIL NFR BLD: 2.7 % (ref 0–6.9)
EPI CELLS #/AREA URNS HPF: NEGATIVE /HPF
EPI CELLS #/AREA URNS HPF: NEGATIVE /HPF
ERYTHROCYTE [DISTWIDTH] IN BLOOD BY AUTOMATED COUNT: 47 FL (ref 35.9–50)
ERYTHROCYTE [DISTWIDTH] IN BLOOD BY AUTOMATED COUNT: 52.1 FL (ref 35.9–50)
ERYTHROCYTE [DISTWIDTH] IN BLOOD BY AUTOMATED COUNT: 52.2 FL (ref 35.9–50)
ERYTHROCYTE [DISTWIDTH] IN BLOOD BY AUTOMATED COUNT: 53.3 FL (ref 35.9–50)
ERYTHROCYTE [DISTWIDTH] IN BLOOD BY AUTOMATED COUNT: 54.4 FL (ref 35.9–50)
ERYTHROCYTE [DISTWIDTH] IN BLOOD BY AUTOMATED COUNT: 54.9 FL (ref 35.9–50)
ERYTHROCYTE [DISTWIDTH] IN BLOOD BY AUTOMATED COUNT: 54.9 FL (ref 35.9–50)
FLUAV RNA SPEC QL NAA+PROBE: NEGATIVE
FLUBV RNA SPEC QL NAA+PROBE: NEGATIVE
GLOBULIN SER CALC-MCNC: 2.2 G/DL (ref 1.9–3.5)
GLOBULIN SER CALC-MCNC: 2.3 G/DL (ref 1.9–3.5)
GLOBULIN SER CALC-MCNC: 2.3 G/DL (ref 1.9–3.5)
GLOBULIN SER CALC-MCNC: 2.4 G/DL (ref 1.9–3.5)
GLOBULIN SER CALC-MCNC: 2.4 G/DL (ref 1.9–3.5)
GLOBULIN SER CALC-MCNC: 2.5 G/DL (ref 1.9–3.5)
GLOBULIN SER CALC-MCNC: 2.6 G/DL (ref 1.9–3.5)
GLOBULIN SER CALC-MCNC: 2.7 G/DL (ref 1.9–3.5)
GLOBULIN SER CALC-MCNC: 3.1 G/DL (ref 1.9–3.5)
GLUCOSE SERPL-MCNC: 103 MG/DL (ref 65–99)
GLUCOSE SERPL-MCNC: 106 MG/DL (ref 65–99)
GLUCOSE SERPL-MCNC: 110 MG/DL (ref 65–99)
GLUCOSE SERPL-MCNC: 120 MG/DL (ref 65–99)
GLUCOSE SERPL-MCNC: 137 MG/DL (ref 65–99)
GLUCOSE SERPL-MCNC: 142 MG/DL (ref 65–99)
GLUCOSE SERPL-MCNC: 90 MG/DL (ref 65–99)
GLUCOSE SERPL-MCNC: 91 MG/DL (ref 65–99)
GLUCOSE SERPL-MCNC: 91 MG/DL (ref 65–99)
GLUCOSE SERPL-MCNC: 92 MG/DL (ref 65–99)
GLUCOSE SERPL-MCNC: 92 MG/DL (ref 65–99)
GLUCOSE UR STRIP.AUTO-MCNC: NEGATIVE MG/DL
GRAM STN SPEC: ABNORMAL
GRAM STN SPEC: NORMAL
HAV IGM SERPL QL IA: NEGATIVE
HBV CORE AB SERPL QL IA: REACTIVE
HBV CORE IGM SER QL: NEGATIVE
HBV DNA SERPL NAA+PROBE-ACNC: 153 IU/ML
HBV DNA SERPL NAA+PROBE-LOG IU: 2.19 LOG IU/ML
HBV DNA SERPL QL NAA+PROBE: DETECTED
HBV E AG SERPL QL IA: NEGATIVE
HBV SURFACE AB SERPL IA-ACNC: 61.83 MIU/ML (ref 0–10)
HBV SURFACE AG SER QL: REACTIVE
HBV SURFACE AG SERPL QL NT: POSITIVE
HCT VFR BLD AUTO: 34.5 % (ref 37–47)
HCT VFR BLD AUTO: 35.5 % (ref 37–47)
HCT VFR BLD AUTO: 35.8 % (ref 37–47)
HCT VFR BLD AUTO: 37.1 % (ref 37–47)
HCT VFR BLD AUTO: 43.6 % (ref 37–47)
HCT VFR BLD AUTO: 43.7 % (ref 37–47)
HCT VFR BLD AUTO: 44.1 % (ref 37–47)
HCV AB SER QL: NEGATIVE
HDLC SERPL-MCNC: 51 MG/DL
HEMOCCULT STL QL IA: NEGATIVE
HGB BLD-MCNC: 11.1 G/DL (ref 12–16)
HGB BLD-MCNC: 11.3 G/DL (ref 12–16)
HGB BLD-MCNC: 11.7 G/DL (ref 12–16)
HGB BLD-MCNC: 12.7 G/DL (ref 12–16)
HGB BLD-MCNC: 13.5 G/DL (ref 12–16)
HGB BLD-MCNC: 13.7 G/DL (ref 12–16)
HGB BLD-MCNC: 13.9 G/DL (ref 12–16)
HYALINE CASTS #/AREA URNS LPF: ABNORMAL /LPF
HYALINE CASTS #/AREA URNS LPF: ABNORMAL /LPF
IMM GRANULOCYTES # BLD AUTO: 0.01 K/UL (ref 0–0.11)
IMM GRANULOCYTES # BLD AUTO: 0.02 K/UL (ref 0–0.11)
IMM GRANULOCYTES NFR BLD AUTO: 0.1 % (ref 0–0.9)
IMM GRANULOCYTES NFR BLD AUTO: 0.2 % (ref 0–0.9)
IMM GRANULOCYTES NFR BLD AUTO: 0.2 % (ref 0–0.9)
IMM GRANULOCYTES NFR BLD AUTO: 0.3 % (ref 0–0.9)
IMM GRANULOCYTES NFR BLD AUTO: 0.3 % (ref 0–0.9)
IMM GRANULOCYTES NFR BLD AUTO: 0.4 % (ref 0–0.9)
INR PPP: 1.04 (ref 0.87–1.13)
KETONES UR STRIP.AUTO-MCNC: ABNORMAL MG/DL
KETONES UR STRIP.AUTO-MCNC: NEGATIVE MG/DL
KETONES UR STRIP.AUTO-MCNC: NEGATIVE MG/DL
LDLC SERPL CALC-MCNC: 116 MG/DL
LEUKOCYTE ESTERASE UR QL STRIP.AUTO: ABNORMAL
LEUKOCYTE ESTERASE UR QL STRIP.AUTO: ABNORMAL
LEUKOCYTE ESTERASE UR QL STRIP.AUTO: NEGATIVE
LIPASE SERPL-CCNC: 20 U/L (ref 11–82)
LIPASE SERPL-CCNC: 20 U/L (ref 11–82)
LYMPHOCYTES # BLD AUTO: 0.36 K/UL (ref 1–4.8)
LYMPHOCYTES # BLD AUTO: 0.54 K/UL (ref 1–4.8)
LYMPHOCYTES # BLD AUTO: 0.75 K/UL (ref 1–4.8)
LYMPHOCYTES # BLD AUTO: 0.81 K/UL (ref 1–4.8)
LYMPHOCYTES # BLD AUTO: 0.82 K/UL (ref 1–4.8)
LYMPHOCYTES # BLD AUTO: 1.21 K/UL (ref 1–4.8)
LYMPHOCYTES NFR BLD: 11.2 % (ref 22–41)
LYMPHOCYTES NFR BLD: 13.5 % (ref 22–41)
LYMPHOCYTES NFR BLD: 14.3 % (ref 22–41)
LYMPHOCYTES NFR BLD: 24.5 % (ref 22–41)
LYMPHOCYTES NFR BLD: 4.8 % (ref 22–41)
LYMPHOCYTES NFR BLD: 7.1 % (ref 22–41)
MCH RBC QN AUTO: 30.5 PG (ref 27–33)
MCH RBC QN AUTO: 30.5 PG (ref 27–33)
MCH RBC QN AUTO: 31.3 PG (ref 27–33)
MCH RBC QN AUTO: 31.4 PG (ref 27–33)
MCH RBC QN AUTO: 31.4 PG (ref 27–33)
MCH RBC QN AUTO: 31.8 PG (ref 27–33)
MCH RBC QN AUTO: 32 PG (ref 27–33)
MCHC RBC AUTO-ENTMCNC: 31 G/DL (ref 33.6–35)
MCHC RBC AUTO-ENTMCNC: 31.1 G/DL (ref 33.6–35)
MCHC RBC AUTO-ENTMCNC: 31.8 G/DL (ref 33.6–35)
MCHC RBC AUTO-ENTMCNC: 31.8 G/DL (ref 33.6–35)
MCHC RBC AUTO-ENTMCNC: 32.2 G/DL (ref 33.6–35)
MCHC RBC AUTO-ENTMCNC: 32.7 G/DL (ref 33.6–35)
MCHC RBC AUTO-ENTMCNC: 34.2 G/DL (ref 33.6–35)
MCV RBC AUTO: 101.4 FL (ref 81.4–97.8)
MCV RBC AUTO: 93 FL (ref 81.4–97.8)
MCV RBC AUTO: 95.8 FL (ref 81.4–97.8)
MCV RBC AUTO: 97.7 FL (ref 81.4–97.8)
MCV RBC AUTO: 97.8 FL (ref 81.4–97.8)
MCV RBC AUTO: 98.2 FL (ref 81.4–97.8)
MCV RBC AUTO: 98.3 FL (ref 81.4–97.8)
MICRO URNS: ABNORMAL
MICRO URNS: ABNORMAL
MICRO URNS: NORMAL
MONOCYTES # BLD AUTO: 0.43 K/UL (ref 0–0.85)
MONOCYTES # BLD AUTO: 0.43 K/UL (ref 0–0.85)
MONOCYTES # BLD AUTO: 0.58 K/UL (ref 0–0.85)
MONOCYTES # BLD AUTO: 0.59 K/UL (ref 0–0.85)
MONOCYTES # BLD AUTO: 0.72 K/UL (ref 0–0.85)
MONOCYTES # BLD AUTO: 0.83 K/UL (ref 0–0.85)
MONOCYTES NFR BLD AUTO: 10.9 % (ref 0–13.4)
MONOCYTES NFR BLD AUTO: 11.8 % (ref 0–13.4)
MONOCYTES NFR BLD AUTO: 12 % (ref 0–13.4)
MONOCYTES NFR BLD AUTO: 5.7 % (ref 0–13.4)
MONOCYTES NFR BLD AUTO: 8.1 % (ref 0–13.4)
MONOCYTES NFR BLD AUTO: 8.2 % (ref 0–13.4)
NEUTROPHILS # BLD AUTO: 2.98 K/UL (ref 2–7.15)
NEUTROPHILS # BLD AUTO: 3.97 K/UL (ref 2–7.15)
NEUTROPHILS # BLD AUTO: 4.27 K/UL (ref 2–7.15)
NEUTROPHILS # BLD AUTO: 5.8 K/UL (ref 2–7.15)
NEUTROPHILS # BLD AUTO: 6.21 K/UL (ref 2–7.15)
NEUTROPHILS # BLD AUTO: 6.69 K/UL (ref 2–7.15)
NEUTROPHILS NFR BLD: 60.5 % (ref 44–72)
NEUTROPHILS NFR BLD: 71.3 % (ref 44–72)
NEUTROPHILS NFR BLD: 75.9 % (ref 44–72)
NEUTROPHILS NFR BLD: 79.6 % (ref 44–72)
NEUTROPHILS NFR BLD: 81.5 % (ref 44–72)
NEUTROPHILS NFR BLD: 89.3 % (ref 44–72)
NITRITE UR QL STRIP.AUTO: NEGATIVE
NITRITE UR QL STRIP.AUTO: NEGATIVE
NITRITE UR QL STRIP.AUTO: POSITIVE
NRBC # BLD AUTO: 0 K/UL
NRBC BLD-RTO: 0 /100 WBC
PATHOLOGY CONSULT NOTE: NORMAL
PH UR STRIP.AUTO: 5 [PH]
PH UR STRIP.AUTO: 5 [PH] (ref 5–8)
PH UR STRIP.AUTO: 7 [PH]
PLATELET # BLD AUTO: 189 K/UL (ref 164–446)
PLATELET # BLD AUTO: 198 K/UL (ref 164–446)
PLATELET # BLD AUTO: 221 K/UL (ref 164–446)
PLATELET # BLD AUTO: 234 K/UL (ref 164–446)
PLATELET # BLD AUTO: 235 K/UL (ref 164–446)
PLATELET # BLD AUTO: 267 K/UL (ref 164–446)
PLATELET # BLD AUTO: 273 K/UL (ref 164–446)
PMV BLD AUTO: 10 FL (ref 9–12.9)
PMV BLD AUTO: 10.2 FL (ref 9–12.9)
PMV BLD AUTO: 9.6 FL (ref 9–12.9)
PMV BLD AUTO: 9.8 FL (ref 9–12.9)
POTASSIUM SERPL-SCNC: 3.6 MMOL/L (ref 3.6–5.5)
POTASSIUM SERPL-SCNC: 3.9 MMOL/L (ref 3.6–5.5)
POTASSIUM SERPL-SCNC: 3.9 MMOL/L (ref 3.6–5.5)
POTASSIUM SERPL-SCNC: 4 MMOL/L (ref 3.6–5.5)
POTASSIUM SERPL-SCNC: 4 MMOL/L (ref 3.6–5.5)
POTASSIUM SERPL-SCNC: 4.2 MMOL/L (ref 3.6–5.5)
POTASSIUM SERPL-SCNC: 4.3 MMOL/L (ref 3.6–5.5)
POTASSIUM SERPL-SCNC: 4.8 MMOL/L (ref 3.6–5.5)
POTASSIUM SERPL-SCNC: 4.9 MMOL/L (ref 3.6–5.5)
POTASSIUM SERPL-SCNC: 5.1 MMOL/L (ref 3.6–5.5)
POTASSIUM SERPL-SCNC: 5.3 MMOL/L (ref 3.6–5.5)
PROT SERPL-MCNC: 5.4 G/DL (ref 6–8.2)
PROT SERPL-MCNC: 5.6 G/DL (ref 6–8.2)
PROT SERPL-MCNC: 5.7 G/DL (ref 6–8.2)
PROT SERPL-MCNC: 5.9 G/DL (ref 6–8.2)
PROT SERPL-MCNC: 6 G/DL (ref 6–8.2)
PROT SERPL-MCNC: 6.1 G/DL (ref 6–8.2)
PROT SERPL-MCNC: 6.2 G/DL (ref 6–8.2)
PROT SERPL-MCNC: 6.8 G/DL (ref 6–8.2)
PROT SERPL-MCNC: 7.1 G/DL (ref 6–8.2)
PROT SERPL-MCNC: 7.4 G/DL (ref 6–8.2)
PROT UR QL STRIP: NEGATIVE MG/DL
PROTHROMBIN TIME: 13.8 SEC (ref 12–14.6)
RBC # BLD AUTO: 3.53 M/UL (ref 4.2–5.4)
RBC # BLD AUTO: 3.61 M/UL (ref 4.2–5.4)
RBC # BLD AUTO: 3.66 M/UL (ref 4.2–5.4)
RBC # BLD AUTO: 3.99 M/UL (ref 4.2–5.4)
RBC # BLD AUTO: 4.3 M/UL (ref 4.2–5.4)
RBC # BLD AUTO: 4.49 M/UL (ref 4.2–5.4)
RBC # BLD AUTO: 4.56 M/UL (ref 4.2–5.4)
RBC # URNS HPF: ABNORMAL /HPF
RBC # URNS HPF: ABNORMAL /HPF
RBC UR QL AUTO: ABNORMAL
RBC UR QL AUTO: ABNORMAL
RBC UR QL AUTO: NEGATIVE
SIGNIFICANT IND 70042: ABNORMAL
SIGNIFICANT IND 70042: ABNORMAL
SIGNIFICANT IND 70042: NORMAL
SITE SITE: ABNORMAL
SITE SITE: ABNORMAL
SITE SITE: NORMAL
SODIUM SERPL-SCNC: 135 MMOL/L (ref 135–145)
SODIUM SERPL-SCNC: 136 MMOL/L (ref 135–145)
SODIUM SERPL-SCNC: 136 MMOL/L (ref 135–145)
SODIUM SERPL-SCNC: 137 MMOL/L (ref 135–145)
SODIUM SERPL-SCNC: 138 MMOL/L (ref 135–145)
SODIUM SERPL-SCNC: 139 MMOL/L (ref 135–145)
SODIUM SERPL-SCNC: 140 MMOL/L (ref 135–145)
SODIUM SERPL-SCNC: 141 MMOL/L (ref 135–145)
SODIUM SERPL-SCNC: 142 MMOL/L (ref 135–145)
SOURCE SOURCE: ABNORMAL
SOURCE SOURCE: ABNORMAL
SOURCE SOURCE: NORMAL
SP GR UR STRIP.AUTO: 1.01
SP GR UR STRIP.AUTO: 1.02
SP GR UR STRIP.AUTO: 1.02
TEST NAME 95000: NORMAL
TRIGL SERPL-MCNC: 36 MG/DL (ref 0–149)
TROPONIN I SERPL-MCNC: <0.01 NG/ML (ref 0–0.04)
UROBILINOGEN UR STRIP.AUTO-MCNC: 0.2 MG/DL
UROBILINOGEN UR STRIP.AUTO-MCNC: 0.2 MG/DL
UROBILINOGEN UR STRIP.AUTO-MCNC: 1 MG/DL
WBC # BLD AUTO: 4.9 K/UL (ref 4.8–10.8)
WBC # BLD AUTO: 5.2 K/UL (ref 4.8–10.8)
WBC # BLD AUTO: 5.7 K/UL (ref 4.8–10.8)
WBC # BLD AUTO: 6 K/UL (ref 4.8–10.8)
WBC # BLD AUTO: 7.3 K/UL (ref 4.8–10.8)
WBC # BLD AUTO: 7.5 K/UL (ref 4.8–10.8)
WBC # BLD AUTO: 7.6 K/UL (ref 4.8–10.8)
WBC #/AREA URNS HPF: ABNORMAL /HPF
WBC #/AREA URNS HPF: ABNORMAL /HPF

## 2019-01-01 PROCEDURE — 97116 GAIT TRAINING THERAPY: CPT

## 2019-01-01 PROCEDURE — S9126 HOSPICE CARE, IN THE HOME, P: HCPCS

## 2019-01-01 PROCEDURE — A9270 NON-COVERED ITEM OR SERVICE: HCPCS | Performed by: INTERNAL MEDICINE

## 2019-01-01 PROCEDURE — G0299 HHS/HOSPICE OF RN EA 15 MIN: HCPCS

## 2019-01-01 PROCEDURE — 99232 SBSQ HOSP IP/OBS MODERATE 35: CPT | Performed by: INTERNAL MEDICINE

## 2019-01-01 PROCEDURE — 700111 HCHG RX REV CODE 636 W/ 250 OVERRIDE (IP): Performed by: RADIOLOGY

## 2019-01-01 PROCEDURE — 93005 ELECTROCARDIOGRAM TRACING: CPT | Performed by: EMERGENCY MEDICINE

## 2019-01-01 PROCEDURE — 80053 COMPREHEN METABOLIC PANEL: CPT

## 2019-01-01 PROCEDURE — 770006 HCHG ROOM/CARE - MED/SURG/GYN SEMI*

## 2019-01-01 PROCEDURE — 0FJB8ZZ INSPECTION OF HEPATOBILIARY DUCT, VIA NATURAL OR ARTIFICIAL OPENING ENDOSCOPIC: ICD-10-PCS | Performed by: INTERNAL MEDICINE

## 2019-01-01 PROCEDURE — 700105 HCHG RX REV CODE 258: Performed by: ANESTHESIOLOGY

## 2019-01-01 PROCEDURE — 502240 HCHG MISC OR SUPPLY RC 0272: Performed by: INTERNAL MEDICINE

## 2019-01-01 PROCEDURE — 700111 HCHG RX REV CODE 636 W/ 250 OVERRIDE (IP): Performed by: INTERNAL MEDICINE

## 2019-01-01 PROCEDURE — 700102 HCHG RX REV CODE 250 W/ 637 OVERRIDE(OP): Performed by: HOSPITALIST

## 2019-01-01 PROCEDURE — 74183 MRI ABD W/O CNTR FLWD CNTR: CPT

## 2019-01-01 PROCEDURE — G0155 HHCP-SVS OF CSW,EA 15 MIN: HCPCS

## 2019-01-01 PROCEDURE — 99285 EMERGENCY DEPT VISIT HI MDM: CPT

## 2019-01-01 PROCEDURE — 99223 1ST HOSP IP/OBS HIGH 75: CPT | Mod: AI | Performed by: INTERNAL MEDICINE

## 2019-01-01 PROCEDURE — 6650990 HC HOSPICE AND HOME CARE RX REV CODE 0250

## 2019-01-01 PROCEDURE — BF131ZZ FLUOROSCOPY OF GALLBLADDER AND BILE DUCTS USING LOW OSMOLAR CONTRAST: ICD-10-PCS | Performed by: RADIOLOGY

## 2019-01-01 PROCEDURE — 160035 HCHG PACU - 1ST 60 MINS PHASE I: Performed by: INTERNAL MEDICINE

## 2019-01-01 PROCEDURE — 700102 HCHG RX REV CODE 250 W/ 637 OVERRIDE(OP): Performed by: INTERNAL MEDICINE

## 2019-01-01 PROCEDURE — 93005 ELECTROCARDIOGRAM TRACING: CPT

## 2019-01-01 PROCEDURE — 80076 HEPATIC FUNCTION PANEL: CPT

## 2019-01-01 PROCEDURE — 700101 HCHG RX REV CODE 250: Performed by: EMERGENCY MEDICINE

## 2019-01-01 PROCEDURE — 86480 TB TEST CELL IMMUN MEASURE: CPT

## 2019-01-01 PROCEDURE — 87517 HEPATITIS B DNA QUANT: CPT

## 2019-01-01 PROCEDURE — 99213 OFFICE O/P EST LOW 20 MIN: CPT | Performed by: RADIOLOGY

## 2019-01-01 PROCEDURE — 36415 COLL VENOUS BLD VENIPUNCTURE: CPT

## 2019-01-01 PROCEDURE — G0270 MNT SUBS TX FOR CHANGE DX: HCPCS

## 2019-01-01 PROCEDURE — 96374 THER/PROPH/DIAG INJ IV PUSH: CPT

## 2019-01-01 PROCEDURE — 99233 SBSQ HOSP IP/OBS HIGH 50: CPT | Performed by: SURGERY

## 2019-01-01 PROCEDURE — A9270 NON-COVERED ITEM OR SERVICE: HCPCS | Performed by: HOSPITALIST

## 2019-01-01 PROCEDURE — A9585 GADOBUTROL INJECTION: HCPCS | Performed by: INTERNAL MEDICINE

## 2019-01-01 PROCEDURE — C1769 GUIDE WIRE: HCPCS | Performed by: INTERNAL MEDICINE

## 2019-01-01 PROCEDURE — 85025 COMPLETE CBC W/AUTO DIFF WBC: CPT

## 2019-01-01 PROCEDURE — 88173 CYTOPATH EVAL FNA REPORT: CPT

## 2019-01-01 PROCEDURE — 665036 HSPC NOTICE OF ELECTION NOE

## 2019-01-01 PROCEDURE — 87205 SMEAR GRAM STAIN: CPT

## 2019-01-01 PROCEDURE — 160208 HCHG ENDO MINUTES - EA ADDL 1 MIN LEVEL 4: Performed by: INTERNAL MEDICINE

## 2019-01-01 PROCEDURE — 88307 TISSUE EXAM BY PATHOLOGIST: CPT

## 2019-01-01 PROCEDURE — A4520 INCONTINENCE GARMENT ANYTYPE: HCPCS

## 2019-01-01 PROCEDURE — 85027 COMPLETE CBC AUTOMATED: CPT

## 2019-01-01 PROCEDURE — 99223 1ST HOSP IP/OBS HIGH 75: CPT | Mod: AI | Performed by: HOSPITALIST

## 2019-01-01 PROCEDURE — G0156 HHCP-SVS OF AIDE,EA 15 MIN: HCPCS

## 2019-01-01 PROCEDURE — 81001 URINALYSIS AUTO W/SCOPE: CPT

## 2019-01-01 PROCEDURE — 160002 HCHG RECOVERY MINUTES (STAT): Performed by: INTERNAL MEDICINE

## 2019-01-01 PROCEDURE — 99212 OFFICE O/P EST SF 10 MIN: CPT | Performed by: RADIOLOGY

## 2019-01-01 PROCEDURE — 99233 SBSQ HOSP IP/OBS HIGH 50: CPT | Performed by: INTERNAL MEDICINE

## 2019-01-01 PROCEDURE — G0495 RN CARE TRAIN/EDU IN HH: HCPCS

## 2019-01-01 PROCEDURE — 700102 HCHG RX REV CODE 250 W/ 637 OVERRIDE(OP): Performed by: EMERGENCY MEDICINE

## 2019-01-01 PROCEDURE — 160009 HCHG ANES TIME/MIN: Performed by: INTERNAL MEDICINE

## 2019-01-01 PROCEDURE — 74177 CT ABD & PELVIS W/CONTRAST: CPT

## 2019-01-01 PROCEDURE — 76705 ECHO EXAM OF ABDOMEN: CPT

## 2019-01-01 PROCEDURE — 97161 PT EVAL LOW COMPLEX 20 MIN: CPT

## 2019-01-01 PROCEDURE — 86706 HEP B SURFACE ANTIBODY: CPT

## 2019-01-01 PROCEDURE — 87350 HEPATITIS BE AG IA: CPT

## 2019-01-01 PROCEDURE — 99214 OFFICE O/P EST MOD 30 MIN: CPT | Performed by: FAMILY MEDICINE

## 2019-01-01 PROCEDURE — 86704 HEP B CORE ANTIBODY TOTAL: CPT

## 2019-01-01 PROCEDURE — 160203 HCHG ENDO MINUTES - 1ST 30 MINS LEVEL 4: Performed by: INTERNAL MEDICINE

## 2019-01-01 PROCEDURE — 700101 HCHG RX REV CODE 250: Performed by: ANESTHESIOLOGY

## 2019-01-01 PROCEDURE — A4927 NON-STERILE GLOVES: HCPCS

## 2019-01-01 PROCEDURE — 700117 HCHG RX CONTRAST REV CODE 255: Performed by: RADIOLOGY

## 2019-01-01 PROCEDURE — 500066 HCHG BITE BLOCK, ECT: Performed by: INTERNAL MEDICINE

## 2019-01-01 PROCEDURE — 665001 SOC-HOME HEALTH

## 2019-01-01 PROCEDURE — 83690 ASSAY OF LIPASE: CPT

## 2019-01-01 PROCEDURE — 71045 X-RAY EXAM CHEST 1 VIEW: CPT

## 2019-01-01 PROCEDURE — 97530 THERAPEUTIC ACTIVITIES: CPT

## 2019-01-01 PROCEDURE — 0FJD8ZZ INSPECTION OF PANCREATIC DUCT, VIA NATURAL OR ARTIFICIAL OPENING ENDOSCOPIC: ICD-10-PCS | Performed by: INTERNAL MEDICINE

## 2019-01-01 PROCEDURE — 94640 AIRWAY INHALATION TREATMENT: CPT

## 2019-01-01 PROCEDURE — 700111 HCHG RX REV CODE 636 W/ 250 OVERRIDE (IP): Performed by: ANESTHESIOLOGY

## 2019-01-01 PROCEDURE — A9270 NON-COVERED ITEM OR SERVICE: HCPCS | Performed by: EMERGENCY MEDICINE

## 2019-01-01 PROCEDURE — 74170 CT ABD WO CNTRST FLWD CNTRST: CPT

## 2019-01-01 PROCEDURE — 87077 CULTURE AEROBIC IDENTIFY: CPT

## 2019-01-01 PROCEDURE — 96375 TX/PRO/DX INJ NEW DRUG ADDON: CPT

## 2019-01-01 PROCEDURE — 700111 HCHG RX REV CODE 636 W/ 250 OVERRIDE (IP): Performed by: HOSPITALIST

## 2019-01-01 PROCEDURE — 700111 HCHG RX REV CODE 636 W/ 250 OVERRIDE (IP): Performed by: EMERGENCY MEDICINE

## 2019-01-01 PROCEDURE — 700111 HCHG RX REV CODE 636 W/ 250 OVERRIDE (IP)

## 2019-01-01 PROCEDURE — G0493 RN CARE EA 15 MIN HH/HOSPICE: HCPCS

## 2019-01-01 PROCEDURE — 88172 CYTP DX EVAL FNA 1ST EA SITE: CPT

## 2019-01-01 PROCEDURE — 6650393 HCR  PAPERTOWELS

## 2019-01-01 PROCEDURE — 73564 X-RAY EXAM KNEE 4 OR MORE: CPT | Mod: RT

## 2019-01-01 PROCEDURE — 160036 HCHG PACU - EA ADDL 30 MINS PHASE I: Performed by: INTERNAL MEDICINE

## 2019-01-01 PROCEDURE — BF111ZZ FLUOROSCOPY OF BILIARY AND PANCREATIC DUCTS USING LOW OSMOLAR CONTRAST: ICD-10-PCS | Performed by: RADIOLOGY

## 2019-01-01 PROCEDURE — 80074 ACUTE HEPATITIS PANEL: CPT

## 2019-01-01 PROCEDURE — 0F9930Z DRAINAGE OF COMMON BILE DUCT WITH DRAINAGE DEVICE, PERCUTANEOUS APPROACH: ICD-10-PCS | Performed by: RADIOLOGY

## 2019-01-01 PROCEDURE — 87086 URINE CULTURE/COLONY COUNT: CPT

## 2019-01-01 PROCEDURE — 99153 MOD SED SAME PHYS/QHP EA: CPT

## 2019-01-01 PROCEDURE — 74181 MRI ABDOMEN W/O CONTRAST: CPT

## 2019-01-01 PROCEDURE — 160048 HCHG OR STATISTICAL LEVEL 1-5: Performed by: INTERNAL MEDICINE

## 2019-01-01 PROCEDURE — 700117 HCHG RX CONTRAST REV CODE 255

## 2019-01-01 PROCEDURE — 87186 SC STD MICRODIL/AGAR DIL: CPT

## 2019-01-01 PROCEDURE — 99239 HOSP IP/OBS DSCHRG MGMT >30: CPT | Performed by: INTERNAL MEDICINE

## 2019-01-01 PROCEDURE — 81003 URINALYSIS AUTO W/O SCOPE: CPT

## 2019-01-01 PROCEDURE — 87106 FUNGI IDENTIFICATION YEAST: CPT

## 2019-01-01 PROCEDURE — 700105 HCHG RX REV CODE 258

## 2019-01-01 PROCEDURE — 700117 HCHG RX CONTRAST REV CODE 255: Performed by: INTERNAL MEDICINE

## 2019-01-01 PROCEDURE — 99214 OFFICE O/P EST MOD 30 MIN: CPT | Performed by: RADIOLOGY

## 2019-01-01 PROCEDURE — 71260 CT THORAX DX C+: CPT

## 2019-01-01 PROCEDURE — A6250 SKIN SEAL PROTECT MOISTURIZR: HCPCS

## 2019-01-01 PROCEDURE — 73501 X-RAY EXAM HIP UNI 1 VIEW: CPT | Mod: RT

## 2019-01-01 PROCEDURE — 700101 HCHG RX REV CODE 250: Performed by: RADIOLOGY

## 2019-01-01 PROCEDURE — 87341 HEP B SURFACE AG NEUTRLZJ IA: CPT

## 2019-01-01 PROCEDURE — A4554 DISPOSABLE UNDERPADS: HCPCS

## 2019-01-01 PROCEDURE — 85730 THROMBOPLASTIN TIME PARTIAL: CPT

## 2019-01-01 PROCEDURE — 80061 LIPID PANEL: CPT

## 2019-01-01 PROCEDURE — 99221 1ST HOSP IP/OBS SF/LOW 40: CPT | Performed by: INTERNAL MEDICINE

## 2019-01-01 PROCEDURE — 700101 HCHG RX REV CODE 250

## 2019-01-01 PROCEDURE — 87070 CULTURE OTHR SPECIMN AEROBIC: CPT

## 2019-01-01 PROCEDURE — 85610 PROTHROMBIN TIME: CPT

## 2019-01-01 PROCEDURE — 87502 INFLUENZA DNA AMP PROBE: CPT

## 2019-01-01 PROCEDURE — 86301 IMMUNOASSAY TUMOR CA 19-9: CPT

## 2019-01-01 PROCEDURE — 700117 HCHG RX CONTRAST REV CODE 255: Performed by: EMERGENCY MEDICINE

## 2019-01-01 PROCEDURE — 82274 ASSAY TEST FOR BLOOD FECAL: CPT

## 2019-01-01 PROCEDURE — G0180 MD CERTIFICATION HHA PATIENT: HCPCS | Performed by: FAMILY MEDICINE

## 2019-01-01 PROCEDURE — 110371 HCHG SHELL REV 272: Performed by: INTERNAL MEDICINE

## 2019-01-01 PROCEDURE — 97165 OT EVAL LOW COMPLEX 30 MIN: CPT

## 2019-01-01 PROCEDURE — 6650300 HCR  CLEANSER 4-IN-1

## 2019-01-01 PROCEDURE — BF101ZZ FLUOROSCOPY OF BILE DUCTS USING LOW OSMOLAR CONTRAST: ICD-10-PCS | Performed by: RADIOLOGY

## 2019-01-01 PROCEDURE — 84484 ASSAY OF TROPONIN QUANT: CPT

## 2019-01-01 PROCEDURE — 99205 OFFICE O/P NEW HI 60 MIN: CPT | Performed by: RADIOLOGY

## 2019-01-01 RX ORDER — METOCLOPRAMIDE HYDROCHLORIDE 5 MG/ML
INJECTION INTRAMUSCULAR; INTRAVENOUS PRN
Status: DISCONTINUED | OUTPATIENT
Start: 2019-01-01 | End: 2019-01-01 | Stop reason: SURG

## 2019-01-01 RX ORDER — MORPHINE SULFATE 4 MG/ML
2 INJECTION, SOLUTION INTRAMUSCULAR; INTRAVENOUS
Status: DISCONTINUED | OUTPATIENT
Start: 2019-01-01 | End: 2019-01-01 | Stop reason: HOSPADM

## 2019-01-01 RX ORDER — METRONIDAZOLE 500 MG/1
500 TABLET ORAL EVERY 12 HOURS
Status: DISCONTINUED | OUTPATIENT
Start: 2019-01-01 | End: 2019-01-01 | Stop reason: HOSPADM

## 2019-01-01 RX ORDER — MIDAZOLAM HYDROCHLORIDE 1 MG/ML
INJECTION INTRAMUSCULAR; INTRAVENOUS
Status: COMPLETED
Start: 2019-01-01 | End: 2019-01-01

## 2019-01-01 RX ORDER — DEXAMETHASONE SODIUM PHOSPHATE 4 MG/ML
INJECTION, SOLUTION INTRA-ARTICULAR; INTRALESIONAL; INTRAMUSCULAR; INTRAVENOUS; SOFT TISSUE PRN
Status: DISCONTINUED | OUTPATIENT
Start: 2019-01-01 | End: 2019-01-01 | Stop reason: SURG

## 2019-01-01 RX ORDER — LEVOFLOXACIN 500 MG/1
500 TABLET, FILM COATED ORAL DAILY
Status: DISCONTINUED | OUTPATIENT
Start: 2019-01-01 | End: 2019-01-01 | Stop reason: HOSPADM

## 2019-01-01 RX ORDER — DIPHENHYDRAMINE HYDROCHLORIDE 50 MG/ML
INJECTION INTRAMUSCULAR; INTRAVENOUS
Status: COMPLETED
Start: 2019-01-01 | End: 2019-01-01

## 2019-01-01 RX ORDER — POLYETHYLENE GLYCOL 3350 17 G/17G
1 POWDER, FOR SOLUTION ORAL
Status: DISCONTINUED | OUTPATIENT
Start: 2019-01-01 | End: 2019-01-01 | Stop reason: HOSPADM

## 2019-01-01 RX ORDER — LEVOFLOXACIN 500 MG/1
500 TABLET, FILM COATED ORAL DAILY
Status: CANCELLED | OUTPATIENT
Start: 2019-01-01

## 2019-01-01 RX ORDER — POLYETHYLENE GLYCOL 3350 17 G/17G
1 POWDER, FOR SOLUTION ORAL
Status: CANCELLED | OUTPATIENT
Start: 2019-01-01

## 2019-01-01 RX ORDER — ONDANSETRON 2 MG/ML
4 INJECTION INTRAMUSCULAR; INTRAVENOUS EVERY 4 HOURS PRN
Status: CANCELLED | OUTPATIENT
Start: 2019-01-01

## 2019-01-01 RX ORDER — MIDAZOLAM HYDROCHLORIDE 1 MG/ML
.5-2 INJECTION INTRAMUSCULAR; INTRAVENOUS PRN
Status: ACTIVE | OUTPATIENT
Start: 2019-01-01 | End: 2019-01-01

## 2019-01-01 RX ORDER — LEVOFLOXACIN 500 MG/1
500 TABLET, FILM COATED ORAL EVERY 24 HOURS
Status: DISCONTINUED | OUTPATIENT
Start: 2019-01-01 | End: 2019-01-01

## 2019-01-01 RX ORDER — METRONIDAZOLE 500 MG/1
500 TABLET ORAL EVERY 12 HOURS
Status: DISCONTINUED | OUTPATIENT
Start: 2019-01-01 | End: 2019-01-01

## 2019-01-01 RX ORDER — OXYCODONE HCL 5 MG/5 ML
5 SOLUTION, ORAL ORAL
Status: DISCONTINUED | OUTPATIENT
Start: 2019-01-01 | End: 2019-01-01 | Stop reason: HOSPADM

## 2019-01-01 RX ORDER — BISACODYL 10 MG
10 SUPPOSITORY, RECTAL RECTAL
Status: DISCONTINUED | OUTPATIENT
Start: 2019-01-01 | End: 2019-01-01 | Stop reason: HOSPADM

## 2019-01-01 RX ORDER — HALOPERIDOL 5 MG/ML
1 INJECTION INTRAMUSCULAR
Status: DISCONTINUED | OUTPATIENT
Start: 2019-01-01 | End: 2019-01-01 | Stop reason: HOSPADM

## 2019-01-01 RX ORDER — METRONIDAZOLE 500 MG/1
500 TABLET ORAL EVERY 12 HOURS
Status: CANCELLED | OUTPATIENT
Start: 2019-01-01

## 2019-01-01 RX ORDER — LEVOFLOXACIN 5 MG/ML
500 INJECTION, SOLUTION INTRAVENOUS ONCE
Status: COMPLETED | OUTPATIENT
Start: 2019-01-01 | End: 2019-01-01

## 2019-01-01 RX ORDER — ONDANSETRON 4 MG/1
4 TABLET, ORALLY DISINTEGRATING ORAL EVERY 4 HOURS PRN
Status: DISCONTINUED | OUTPATIENT
Start: 2019-01-01 | End: 2019-01-01 | Stop reason: HOSPADM

## 2019-01-01 RX ORDER — ONDANSETRON 4 MG/1
4 TABLET, ORALLY DISINTEGRATING ORAL EVERY 4 HOURS PRN
Status: CANCELLED | OUTPATIENT
Start: 2019-01-01

## 2019-01-01 RX ORDER — ONDANSETRON 2 MG/ML
4 INJECTION INTRAMUSCULAR; INTRAVENOUS
Status: DISCONTINUED | OUTPATIENT
Start: 2019-01-01 | End: 2019-01-01 | Stop reason: HOSPADM

## 2019-01-01 RX ORDER — OXYCODONE HYDROCHLORIDE 5 MG/1
5-10 TABLET ORAL EVERY 4 HOURS PRN
Status: DISCONTINUED | OUTPATIENT
Start: 2019-01-01 | End: 2019-01-01

## 2019-01-01 RX ORDER — DIPHENHYDRAMINE HYDROCHLORIDE 50 MG/ML
25 INJECTION INTRAMUSCULAR; INTRAVENOUS ONCE
Status: COMPLETED | OUTPATIENT
Start: 2019-01-01 | End: 2019-01-01

## 2019-01-01 RX ORDER — METRONIDAZOLE 500 MG/1
500 TABLET ORAL EVERY 12 HOURS
Refills: 0
Start: 2019-01-01 | End: 2019-01-01

## 2019-01-01 RX ORDER — LISINOPRIL 5 MG/1
5 TABLET ORAL DAILY
Qty: 90 TAB | Refills: 0 | Status: SHIPPED | OUTPATIENT
Start: 2019-01-01 | End: 2019-01-01 | Stop reason: SDUPTHER

## 2019-01-01 RX ORDER — LISINOPRIL 5 MG/1
5 TABLET ORAL DAILY
Status: CANCELLED | OUTPATIENT
Start: 2019-01-01

## 2019-01-01 RX ORDER — PHENYLEPHRINE HYDROCHLORIDE 10 MG/ML
INJECTION, SOLUTION INTRAMUSCULAR; INTRAVENOUS; SUBCUTANEOUS PRN
Status: DISCONTINUED | OUTPATIENT
Start: 2019-01-01 | End: 2019-01-01 | Stop reason: SURG

## 2019-01-01 RX ORDER — SODIUM CHLORIDE, SODIUM LACTATE, POTASSIUM CHLORIDE, CALCIUM CHLORIDE 600; 310; 30; 20 MG/100ML; MG/100ML; MG/100ML; MG/100ML
INJECTION, SOLUTION INTRAVENOUS
Status: DISCONTINUED | OUTPATIENT
Start: 2019-01-01 | End: 2019-01-01 | Stop reason: SURG

## 2019-01-01 RX ORDER — FLUCONAZOLE 200 MG/1
200 TABLET ORAL DAILY
Status: CANCELLED | OUTPATIENT
Start: 2019-01-01

## 2019-01-01 RX ORDER — FLUCONAZOLE 200 MG/1
200 TABLET ORAL DAILY
Status: DISCONTINUED | OUTPATIENT
Start: 2019-01-01 | End: 2019-01-01 | Stop reason: HOSPADM

## 2019-01-01 RX ORDER — SULFAMETHOXAZOLE AND TRIMETHOPRIM 800; 160 MG/1; MG/1
1 TABLET ORAL 2 TIMES DAILY
Qty: 14 TAB | Refills: 0 | Status: SHIPPED | OUTPATIENT
Start: 2019-01-01 | End: 2019-01-01

## 2019-01-01 RX ORDER — DIPHENHYDRAMINE HYDROCHLORIDE 50 MG/ML
12.5 INJECTION INTRAMUSCULAR; INTRAVENOUS
Status: DISCONTINUED | OUTPATIENT
Start: 2019-01-01 | End: 2019-01-01 | Stop reason: HOSPADM

## 2019-01-01 RX ORDER — IBUPROFEN 200 MG
400 TABLET ORAL ONCE
Status: COMPLETED | OUTPATIENT
Start: 2019-01-01 | End: 2019-01-01

## 2019-01-01 RX ORDER — MIRTAZAPINE 15 MG/1
15 TABLET, FILM COATED ORAL
Qty: 30 TAB | Refills: 11 | Status: SHIPPED | OUTPATIENT
Start: 2019-01-01 | End: 2019-01-01

## 2019-01-01 RX ORDER — MORPHINE SULFATE 4 MG/ML
1 INJECTION, SOLUTION INTRAMUSCULAR; INTRAVENOUS
Status: DISCONTINUED | OUTPATIENT
Start: 2019-01-01 | End: 2019-01-01 | Stop reason: HOSPADM

## 2019-01-01 RX ORDER — OXYCODONE HCL 5 MG/5 ML
10 SOLUTION, ORAL ORAL
Status: DISCONTINUED | OUTPATIENT
Start: 2019-01-01 | End: 2019-01-01 | Stop reason: HOSPADM

## 2019-01-01 RX ORDER — MORPHINE SULFATE 4 MG/ML
1-2 INJECTION, SOLUTION INTRAMUSCULAR; INTRAVENOUS
Status: CANCELLED | OUTPATIENT
Start: 2019-01-01

## 2019-01-01 RX ORDER — DIPHENHYDRAMINE HYDROCHLORIDE 50 MG/ML
50 INJECTION INTRAMUSCULAR; INTRAVENOUS ONCE
Status: COMPLETED | OUTPATIENT
Start: 2019-01-01 | End: 2019-01-01

## 2019-01-01 RX ORDER — FLUCONAZOLE 200 MG/1
200 TABLET ORAL DAILY
Status: DISCONTINUED | OUTPATIENT
Start: 2019-01-01 | End: 2019-01-01

## 2019-01-01 RX ORDER — SODIUM CHLORIDE 9 MG/ML
500 INJECTION, SOLUTION INTRAVENOUS
Status: ACTIVE | OUTPATIENT
Start: 2019-01-01 | End: 2019-01-01

## 2019-01-01 RX ORDER — CIPROFLOXACIN 2 MG/ML
INJECTION, SOLUTION INTRAVENOUS PRN
Status: DISCONTINUED | OUTPATIENT
Start: 2019-01-01 | End: 2019-01-01 | Stop reason: SURG

## 2019-01-01 RX ORDER — ONDANSETRON 2 MG/ML
4 INJECTION INTRAMUSCULAR; INTRAVENOUS EVERY 4 HOURS PRN
Status: DISCONTINUED | OUTPATIENT
Start: 2019-01-01 | End: 2019-01-01 | Stop reason: HOSPADM

## 2019-01-01 RX ORDER — OXYCODONE HYDROCHLORIDE 5 MG/1
5-10 TABLET ORAL EVERY 4 HOURS PRN
Status: DISCONTINUED | OUTPATIENT
Start: 2019-01-01 | End: 2019-01-01 | Stop reason: HOSPADM

## 2019-01-01 RX ORDER — LEVOFLOXACIN 500 MG/1
500 TABLET, FILM COATED ORAL DAILY
Status: DISCONTINUED | OUTPATIENT
Start: 2019-01-01 | End: 2019-01-01

## 2019-01-01 RX ORDER — MORPHINE SULFATE 4 MG/ML
1-2 INJECTION, SOLUTION INTRAMUSCULAR; INTRAVENOUS
Status: DISCONTINUED | OUTPATIENT
Start: 2019-01-01 | End: 2019-01-01

## 2019-01-01 RX ORDER — AMOXICILLIN 250 MG
2 CAPSULE ORAL 2 TIMES DAILY
Status: DISCONTINUED | OUTPATIENT
Start: 2019-01-01 | End: 2019-01-01 | Stop reason: HOSPADM

## 2019-01-01 RX ORDER — AMOXICILLIN 250 MG
2 CAPSULE ORAL 2 TIMES DAILY
Qty: 30 TAB | Refills: 0
Start: 2019-01-01 | End: 2019-01-01

## 2019-01-01 RX ORDER — LEVOFLOXACIN 500 MG/1
500 TABLET, FILM COATED ORAL DAILY
Refills: 0
Start: 2019-01-01 | End: 2019-01-01

## 2019-01-01 RX ORDER — OXYCODONE HYDROCHLORIDE 5 MG/1
5-10 TABLET ORAL EVERY 4 HOURS PRN
Status: CANCELLED | OUTPATIENT
Start: 2019-01-01

## 2019-01-01 RX ORDER — LISINOPRIL 5 MG/1
5 TABLET ORAL DAILY
Qty: 100 TAB | Refills: 0 | Status: SHIPPED | OUTPATIENT
Start: 2019-01-01 | End: 2019-01-01 | Stop reason: SDUPTHER

## 2019-01-01 RX ORDER — ONDANSETRON 2 MG/ML
INJECTION INTRAMUSCULAR; INTRAVENOUS PRN
Status: DISCONTINUED | OUTPATIENT
Start: 2019-01-01 | End: 2019-01-01 | Stop reason: SURG

## 2019-01-01 RX ORDER — HYDRALAZINE HYDROCHLORIDE 20 MG/ML
5 INJECTION INTRAMUSCULAR; INTRAVENOUS
Status: DISCONTINUED | OUTPATIENT
Start: 2019-01-01 | End: 2019-01-01 | Stop reason: HOSPADM

## 2019-01-01 RX ORDER — METOPROLOL SUCCINATE 25 MG/1
25 TABLET, EXTENDED RELEASE ORAL DAILY
Qty: 90 TAB | Refills: 0 | Status: SHIPPED | OUTPATIENT
Start: 2019-01-01 | End: 2019-01-01

## 2019-01-01 RX ORDER — AMOXICILLIN 250 MG
2 CAPSULE ORAL 2 TIMES DAILY
Status: CANCELLED | OUTPATIENT
Start: 2019-01-01

## 2019-01-01 RX ORDER — CEPHALEXIN 250 MG/1
250 CAPSULE ORAL 4 TIMES DAILY
Qty: 28 CAP | Refills: 0 | Status: SHIPPED | OUTPATIENT
Start: 2019-01-01 | End: 2019-01-01

## 2019-01-01 RX ORDER — DEXAMETHASONE SODIUM PHOSPHATE 4 MG/ML
INJECTION, SOLUTION INTRA-ARTICULAR; INTRALESIONAL; INTRAMUSCULAR; INTRAVENOUS; SOFT TISSUE
Status: COMPLETED
Start: 2019-01-01 | End: 2019-01-01

## 2019-01-01 RX ORDER — MEPERIDINE HYDROCHLORIDE 25 MG/ML
6.25 INJECTION INTRAMUSCULAR; INTRAVENOUS; SUBCUTANEOUS
Status: DISCONTINUED | OUTPATIENT
Start: 2019-01-01 | End: 2019-01-01 | Stop reason: HOSPADM

## 2019-01-01 RX ORDER — LEVOFLOXACIN 500 MG/1
500 TABLET, FILM COATED ORAL EVERY 24 HOURS
Status: CANCELLED | OUTPATIENT
Start: 2019-01-01

## 2019-01-01 RX ORDER — LISINOPRIL 5 MG/1
5 TABLET ORAL DAILY
Qty: 100 TAB | Refills: 0 | Status: SHIPPED | OUTPATIENT
Start: 2019-01-01 | End: 2019-01-01

## 2019-01-01 RX ORDER — MORPHINE SULFATE 10 MG/ML
5 INJECTION, SOLUTION INTRAMUSCULAR; INTRAVENOUS
Status: DISCONTINUED | OUTPATIENT
Start: 2019-01-01 | End: 2019-01-01 | Stop reason: HOSPADM

## 2019-01-01 RX ORDER — SODIUM CHLORIDE, SODIUM LACTATE, POTASSIUM CHLORIDE, CALCIUM CHLORIDE 600; 310; 30; 20 MG/100ML; MG/100ML; MG/100ML; MG/100ML
INJECTION, SOLUTION INTRAVENOUS CONTINUOUS
Status: DISCONTINUED | OUTPATIENT
Start: 2019-01-01 | End: 2019-01-01 | Stop reason: HOSPADM

## 2019-01-01 RX ORDER — LISINOPRIL 5 MG/1
5 TABLET ORAL DAILY
Status: DISCONTINUED | OUTPATIENT
Start: 2019-01-01 | End: 2019-01-01 | Stop reason: HOSPADM

## 2019-01-01 RX ORDER — ENALAPRILAT 1.25 MG/ML
1.25 INJECTION INTRAVENOUS EVERY 6 HOURS PRN
Status: DISCONTINUED | OUTPATIENT
Start: 2019-01-01 | End: 2019-01-01 | Stop reason: HOSPADM

## 2019-01-01 RX ORDER — BISACODYL 10 MG
10 SUPPOSITORY, RECTAL RECTAL
Status: CANCELLED | OUTPATIENT
Start: 2019-01-01

## 2019-01-01 RX ORDER — DEXAMETHASONE SODIUM PHOSPHATE 4 MG/ML
8 INJECTION, SOLUTION INTRA-ARTICULAR; INTRALESIONAL; INTRAMUSCULAR; INTRAVENOUS; SOFT TISSUE ONCE
Status: COMPLETED | OUTPATIENT
Start: 2019-01-01 | End: 2019-01-01

## 2019-01-01 RX ORDER — SULFAMETHOXAZOLE AND TRIMETHOPRIM 800; 160 MG/1; MG/1
1 TABLET ORAL 2 TIMES DAILY
Qty: 14 TAB | Refills: 0 | Status: SHIPPED | OUTPATIENT
Start: 2019-01-01 | End: 2019-01-01 | Stop reason: SDUPTHER

## 2019-01-01 RX ORDER — MORPHINE SULFATE 4 MG/ML
1-2 INJECTION, SOLUTION INTRAMUSCULAR; INTRAVENOUS
Status: DISCONTINUED | OUTPATIENT
Start: 2019-01-01 | End: 2019-01-01 | Stop reason: HOSPADM

## 2019-01-01 RX ORDER — INDOMETHACIN 50 MG/1
100 SUPPOSITORY RECTAL ONCE
Status: COMPLETED | OUTPATIENT
Start: 2019-01-01 | End: 2019-01-01

## 2019-01-01 RX ORDER — GADOBUTROL 604.72 MG/ML
5 INJECTION INTRAVENOUS ONCE
Status: COMPLETED | OUTPATIENT
Start: 2019-01-01 | End: 2019-01-01

## 2019-01-01 RX ORDER — ONDANSETRON 2 MG/ML
4 INJECTION INTRAMUSCULAR; INTRAVENOUS PRN
Status: ACTIVE | OUTPATIENT
Start: 2019-01-01 | End: 2019-01-01

## 2019-01-01 RX ORDER — ACETAMINOPHEN 325 MG/1
650 TABLET ORAL ONCE
Status: COMPLETED | OUTPATIENT
Start: 2019-01-01 | End: 2019-01-01

## 2019-01-01 RX ORDER — METOPROLOL TARTRATE 1 MG/ML
1 INJECTION, SOLUTION INTRAVENOUS
Status: DISCONTINUED | OUTPATIENT
Start: 2019-01-01 | End: 2019-01-01 | Stop reason: HOSPADM

## 2019-01-01 RX ORDER — CEPHALEXIN 500 MG/1
500 CAPSULE ORAL ONCE
Status: COMPLETED | OUTPATIENT
Start: 2019-01-01 | End: 2019-01-01

## 2019-01-01 RX ORDER — FLUCONAZOLE 200 MG/1
200 TABLET ORAL DAILY
Refills: 0
Start: 2019-01-01 | End: 2019-01-01

## 2019-01-01 RX ORDER — HYDRALAZINE HYDROCHLORIDE 20 MG/ML
10 INJECTION INTRAMUSCULAR; INTRAVENOUS EVERY 4 HOURS PRN
Status: DISCONTINUED | OUTPATIENT
Start: 2019-01-01 | End: 2019-01-01 | Stop reason: HOSPADM

## 2019-01-01 RX ADMIN — SODIUM CHLORIDE, POTASSIUM CHLORIDE, SODIUM LACTATE AND CALCIUM CHLORIDE: 600; 310; 30; 20 INJECTION, SOLUTION INTRAVENOUS at 14:30

## 2019-01-01 RX ADMIN — GADOBUTROL 5 ML: 604.72 INJECTION INTRAVENOUS at 14:29

## 2019-01-01 RX ADMIN — SUGAMMADEX 200 MG: 100 INJECTION, SOLUTION INTRAVENOUS at 10:40

## 2019-01-01 RX ADMIN — FENTANYL CITRATE 25 MCG: 50 INJECTION, SOLUTION INTRAMUSCULAR; INTRAVENOUS at 11:54

## 2019-01-01 RX ADMIN — METRONIDAZOLE 500 MG: 500 TABLET, FILM COATED ORAL at 05:44

## 2019-01-01 RX ADMIN — METRONIDAZOLE 500 MG: 500 TABLET, FILM COATED ORAL at 16:58

## 2019-01-01 RX ADMIN — EPHEDRINE SULFATE 10 MG: 50 INJECTION INTRAMUSCULAR; INTRAVENOUS; SUBCUTANEOUS at 14:57

## 2019-01-01 RX ADMIN — PROPOFOL 100 MG: 10 INJECTION, EMULSION INTRAVENOUS at 14:37

## 2019-01-01 RX ADMIN — LISINOPRIL 5 MG: 5 TABLET ORAL at 04:02

## 2019-01-01 RX ADMIN — OXYCODONE HYDROCHLORIDE 10 MG: 5 TABLET ORAL at 15:56

## 2019-01-01 RX ADMIN — SUGAMMADEX 200 MG: 100 INJECTION, SOLUTION INTRAVENOUS at 15:06

## 2019-01-01 RX ADMIN — METRONIDAZOLE 500 MG: 500 TABLET, FILM COATED ORAL at 05:26

## 2019-01-01 RX ADMIN — OXYCODONE HYDROCHLORIDE 10 MG: 5 TABLET ORAL at 16:03

## 2019-01-01 RX ADMIN — INDOMETHACIN 100 MG: 50 SUPPOSITORY RECTAL at 16:22

## 2019-01-01 RX ADMIN — FLUCONAZOLE 200 MG: 200 TABLET ORAL at 06:56

## 2019-01-01 RX ADMIN — HYDRALAZINE HYDROCHLORIDE 10 MG: 20 INJECTION INTRAMUSCULAR; INTRAVENOUS at 17:54

## 2019-01-01 RX ADMIN — DEXAMETHASONE SODIUM PHOSPHATE 8 MG: 4 INJECTION, SOLUTION INTRA-ARTICULAR; INTRALESIONAL; INTRAMUSCULAR; INTRAVENOUS; SOFT TISSUE at 11:52

## 2019-01-01 RX ADMIN — LISINOPRIL 5 MG: 5 TABLET ORAL at 06:57

## 2019-01-01 RX ADMIN — FLUCONAZOLE 200 MG: 200 TABLET ORAL at 05:44

## 2019-01-01 RX ADMIN — FENTANYL CITRATE 25 MCG: 50 INJECTION, SOLUTION INTRAMUSCULAR; INTRAVENOUS at 12:29

## 2019-01-01 RX ADMIN — SENNOSIDES, DOCUSATE SODIUM 2 TABLET: 50; 8.6 TABLET, FILM COATED ORAL at 17:08

## 2019-01-01 RX ADMIN — DIPHENHYDRAMINE HYDROCHLORIDE 25 MG: 50 INJECTION INTRAMUSCULAR; INTRAVENOUS at 15:43

## 2019-01-01 RX ADMIN — OXYCODONE HYDROCHLORIDE 5 MG: 5 TABLET ORAL at 05:33

## 2019-01-01 RX ADMIN — ROCURONIUM BROMIDE 30 MG: 10 INJECTION INTRAVENOUS at 08:42

## 2019-01-01 RX ADMIN — SODIUM CHLORIDE, POTASSIUM CHLORIDE, SODIUM LACTATE AND CALCIUM CHLORIDE: 600; 310; 30; 20 INJECTION, SOLUTION INTRAVENOUS at 08:29

## 2019-01-01 RX ADMIN — IBUPROFEN 400 MG: 200 TABLET, FILM COATED ORAL at 10:30

## 2019-01-01 RX ADMIN — SENNOSIDES, DOCUSATE SODIUM 1 TABLET: 50; 8.6 TABLET, FILM COATED ORAL at 04:02

## 2019-01-01 RX ADMIN — LISINOPRIL 5 MG: 5 TABLET ORAL at 04:58

## 2019-01-01 RX ADMIN — LEVOFLOXACIN 500 MG: 500 TABLET, FILM COATED ORAL at 04:58

## 2019-01-01 RX ADMIN — MIDAZOLAM 1 MG: 1 INJECTION INTRAMUSCULAR; INTRAVENOUS at 12:29

## 2019-01-01 RX ADMIN — LISINOPRIL 5 MG: 5 TABLET ORAL at 05:39

## 2019-01-01 RX ADMIN — DEXAMETHASONE SODIUM PHOSPHATE 4 MG: 4 INJECTION, SOLUTION INTRA-ARTICULAR; INTRALESIONAL; INTRAMUSCULAR; INTRAVENOUS; SOFT TISSUE at 15:08

## 2019-01-01 RX ADMIN — IOHEXOL 40 ML: 300 INJECTION, SOLUTION INTRAVENOUS at 12:30

## 2019-01-01 RX ADMIN — MIDAZOLAM HYDROCHLORIDE 1 MG: 1 INJECTION, SOLUTION INTRAMUSCULAR; INTRAVENOUS at 11:46

## 2019-01-01 RX ADMIN — LISINOPRIL 5 MG: 5 TABLET ORAL at 04:33

## 2019-01-01 RX ADMIN — SENNOSIDES, DOCUSATE SODIUM 2 TABLET: 50; 8.6 TABLET, FILM COATED ORAL at 17:20

## 2019-01-01 RX ADMIN — MIDAZOLAM HYDROCHLORIDE 1 MG: 1 INJECTION, SOLUTION INTRAMUSCULAR; INTRAVENOUS at 11:54

## 2019-01-01 RX ADMIN — FENTANYL CITRATE 50 MCG: 50 INJECTION, SOLUTION INTRAMUSCULAR; INTRAVENOUS at 09:02

## 2019-01-01 RX ADMIN — DIPHENHYDRAMINE HYDROCHLORIDE 50 MG: 50 INJECTION INTRAMUSCULAR; INTRAVENOUS at 11:54

## 2019-01-01 RX ADMIN — PROPOFOL 200 MG: 10 INJECTION, EMULSION INTRAVENOUS at 14:30

## 2019-01-01 RX ADMIN — OXYCODONE HYDROCHLORIDE 10 MG: 5 TABLET ORAL at 21:56

## 2019-01-01 RX ADMIN — EPHEDRINE SULFATE 10 MG: 50 INJECTION INTRAMUSCULAR; INTRAVENOUS; SUBCUTANEOUS at 08:59

## 2019-01-01 RX ADMIN — FLUCONAZOLE 200 MG: 200 TABLET ORAL at 15:32

## 2019-01-01 RX ADMIN — MIDAZOLAM 1 MG: 1 INJECTION INTRAMUSCULAR; INTRAVENOUS at 11:52

## 2019-01-01 RX ADMIN — LEVOFLOXACIN 500 MG: 500 TABLET, FILM COATED ORAL at 05:26

## 2019-01-01 RX ADMIN — LEVOFLOXACIN 500 MG: 500 TABLET, FILM COATED ORAL at 15:32

## 2019-01-01 RX ADMIN — FENTANYL CITRATE 25 MCG: 50 INJECTION, SOLUTION INTRAMUSCULAR; INTRAVENOUS at 14:37

## 2019-01-01 RX ADMIN — FLUCONAZOLE 200 MG: 200 TABLET ORAL at 04:58

## 2019-01-01 RX ADMIN — POLYETHYLENE GLYCOL 3350 1 PACKET: 17 POWDER, FOR SOLUTION ORAL at 09:33

## 2019-01-01 RX ADMIN — CIPROFLOXACIN 400 MG: 2 INJECTION, SOLUTION INTRAVENOUS at 14:33

## 2019-01-01 RX ADMIN — ONDANSETRON 4 MG: 2 INJECTION INTRAMUSCULAR; INTRAVENOUS at 10:34

## 2019-01-01 RX ADMIN — ENOXAPARIN SODIUM 30 MG: 100 INJECTION SUBCUTANEOUS at 16:58

## 2019-01-01 RX ADMIN — MORPHINE SULFATE 1 MG: 4 INJECTION INTRAVENOUS at 02:13

## 2019-01-01 RX ADMIN — FENTANYL CITRATE 25 MCG: 50 INJECTION, SOLUTION INTRAMUSCULAR; INTRAVENOUS at 11:52

## 2019-01-01 RX ADMIN — IOHEXOL 100 ML: 350 INJECTION, SOLUTION INTRAVENOUS at 15:38

## 2019-01-01 RX ADMIN — DEXAMETHASONE SODIUM PHOSPHATE 8 MG: 4 INJECTION, SOLUTION INTRAMUSCULAR; INTRAVENOUS at 08:42

## 2019-01-01 RX ADMIN — LEVOFLOXACIN 500 MG: 500 TABLET, FILM COATED ORAL at 06:56

## 2019-01-01 RX ADMIN — SENNOSIDES, DOCUSATE SODIUM 2 TABLET: 50; 8.6 TABLET, FILM COATED ORAL at 17:17

## 2019-01-01 RX ADMIN — LEVOFLOXACIN 500 MG: 500 TABLET, FILM COATED ORAL at 05:39

## 2019-01-01 RX ADMIN — LISINOPRIL 5 MG: 5 TABLET ORAL at 05:20

## 2019-01-01 RX ADMIN — OXYCODONE HYDROCHLORIDE 10 MG: 5 TABLET ORAL at 14:15

## 2019-01-01 RX ADMIN — MIDAZOLAM 1 MG: 1 INJECTION INTRAMUSCULAR; INTRAVENOUS at 12:17

## 2019-01-01 RX ADMIN — ACETAMINOPHEN 650 MG: 325 TABLET, FILM COATED ORAL at 10:30

## 2019-01-01 RX ADMIN — DEXAMETHASONE SODIUM PHOSPHATE 8 MG: 4 INJECTION, SOLUTION INTRA-ARTICULAR; INTRALESIONAL; INTRAMUSCULAR; INTRAVENOUS; SOFT TISSUE at 14:30

## 2019-01-01 RX ADMIN — OXYCODONE HYDROCHLORIDE 10 MG: 5 TABLET ORAL at 18:44

## 2019-01-01 RX ADMIN — FENTANYL CITRATE 50 MCG: 50 INJECTION, SOLUTION INTRAMUSCULAR; INTRAVENOUS at 08:56

## 2019-01-01 RX ADMIN — MIDAZOLAM 1 MG: 1 INJECTION INTRAMUSCULAR; INTRAVENOUS at 11:54

## 2019-01-01 RX ADMIN — OXYCODONE HYDROCHLORIDE 10 MG: 5 TABLET ORAL at 13:55

## 2019-01-01 RX ADMIN — FENTANYL CITRATE 25 MCG: 50 INJECTION, SOLUTION INTRAMUSCULAR; INTRAVENOUS at 12:17

## 2019-01-01 RX ADMIN — IOHEXOL 75 ML: 350 INJECTION, SOLUTION INTRAVENOUS at 14:29

## 2019-01-01 RX ADMIN — ONDANSETRON 4 MG: 2 INJECTION INTRAMUSCULAR; INTRAVENOUS at 15:08

## 2019-01-01 RX ADMIN — FENTANYL CITRATE 50 MCG: 50 INJECTION, SOLUTION INTRAMUSCULAR; INTRAVENOUS at 12:01

## 2019-01-01 RX ADMIN — PROPOFOL 100 MG: 10 INJECTION, EMULSION INTRAVENOUS at 08:42

## 2019-01-01 RX ADMIN — IOHEXOL 100 ML: 350 INJECTION, SOLUTION INTRAVENOUS at 16:51

## 2019-01-01 RX ADMIN — LISINOPRIL 5 MG: 5 TABLET ORAL at 06:30

## 2019-01-01 RX ADMIN — METRONIDAZOLE 500 MG: 500 TABLET, FILM COATED ORAL at 15:32

## 2019-01-01 RX ADMIN — ENOXAPARIN SODIUM 30 MG: 100 INJECTION SUBCUTANEOUS at 18:12

## 2019-01-01 RX ADMIN — ONDANSETRON 4 MG: 2 INJECTION INTRAMUSCULAR; INTRAVENOUS at 19:01

## 2019-01-01 RX ADMIN — MIDAZOLAM 1 MG: 1 INJECTION INTRAMUSCULAR; INTRAVENOUS at 12:01

## 2019-01-01 RX ADMIN — METRONIDAZOLE 500 MG: 500 TABLET, FILM COATED ORAL at 17:30

## 2019-01-01 RX ADMIN — METRONIDAZOLE 500 MG: 500 TABLET, FILM COATED ORAL at 18:12

## 2019-01-01 RX ADMIN — METRONIDAZOLE 500 MG: 500 INJECTION, SOLUTION INTRAVENOUS at 13:09

## 2019-01-01 RX ADMIN — ENOXAPARIN SODIUM 30 MG: 100 INJECTION SUBCUTANEOUS at 17:36

## 2019-01-01 RX ADMIN — IOHEXOL 25 ML: 300 INJECTION, SOLUTION INTRAVENOUS at 12:22

## 2019-01-01 RX ADMIN — FENTANYL CITRATE 25 MCG: 50 INJECTION INTRAMUSCULAR; INTRAVENOUS at 11:46

## 2019-01-01 RX ADMIN — LISINOPRIL 5 MG: 5 TABLET ORAL at 05:16

## 2019-01-01 RX ADMIN — LISINOPRIL 5 MG: 5 TABLET ORAL at 05:24

## 2019-01-01 RX ADMIN — FENTANYL CITRATE 250 MCG: 50 INJECTION, SOLUTION INTRAMUSCULAR; INTRAVENOUS at 14:30

## 2019-01-01 RX ADMIN — FLUCONAZOLE 200 MG: 200 TABLET ORAL at 05:26

## 2019-01-01 RX ADMIN — OXYCODONE HYDROCHLORIDE 10 MG: 5 TABLET ORAL at 14:23

## 2019-01-01 RX ADMIN — ONDANSETRON 8 MG: 2 INJECTION INTRAMUSCULAR; INTRAVENOUS at 14:30

## 2019-01-01 RX ADMIN — DIPHENHYDRAMINE HYDROCHLORIDE 50 MG: 50 INJECTION INTRAMUSCULAR; INTRAVENOUS at 11:44

## 2019-01-01 RX ADMIN — ROCURONIUM BROMIDE 30 MG: 10 INJECTION, SOLUTION INTRAVENOUS at 14:30

## 2019-01-01 RX ADMIN — LEVOFLOXACIN 500 MG: 5 INJECTION, SOLUTION INTRAVENOUS at 12:07

## 2019-01-01 RX ADMIN — SENNOSIDES, DOCUSATE SODIUM 2 TABLET: 50; 8.6 TABLET, FILM COATED ORAL at 05:25

## 2019-01-01 RX ADMIN — MIDAZOLAM 1 MG: 1 INJECTION INTRAMUSCULAR; INTRAVENOUS at 11:46

## 2019-01-01 RX ADMIN — LEVOFLOXACIN 500 MG: 500 TABLET, FILM COATED ORAL at 08:00

## 2019-01-01 RX ADMIN — ENOXAPARIN SODIUM 30 MG: 100 INJECTION SUBCUTANEOUS at 17:30

## 2019-01-01 RX ADMIN — OXYCODONE HYDROCHLORIDE 10 MG: 5 TABLET ORAL at 02:07

## 2019-01-01 RX ADMIN — HYDROCORTISONE SODIUM SUCCINATE 200 MG: 100 INJECTION, POWDER, FOR SOLUTION INTRAMUSCULAR; INTRAVENOUS at 15:43

## 2019-01-01 RX ADMIN — MAGNESIUM CITRATE 296 ML: 1.75 LIQUID ORAL at 12:19

## 2019-01-01 RX ADMIN — OXYCODONE HYDROCHLORIDE 10 MG: 5 TABLET ORAL at 21:34

## 2019-01-01 RX ADMIN — SODIUM CHLORIDE, POTASSIUM CHLORIDE, SODIUM LACTATE AND CALCIUM CHLORIDE: 600; 310; 30; 20 INJECTION, SOLUTION INTRAVENOUS at 14:33

## 2019-01-01 RX ADMIN — FENTANYL CITRATE 100 MCG: 50 INJECTION, SOLUTION INTRAMUSCULAR; INTRAVENOUS at 15:00

## 2019-01-01 RX ADMIN — LISINOPRIL 5 MG: 5 TABLET ORAL at 05:44

## 2019-01-01 RX ADMIN — OXYCODONE HYDROCHLORIDE 10 MG: 5 TABLET ORAL at 02:47

## 2019-01-01 RX ADMIN — FENTANYL CITRATE 25 MCG: 50 INJECTION, SOLUTION INTRAMUSCULAR; INTRAVENOUS at 11:46

## 2019-01-01 RX ADMIN — PHENYLEPHRINE HYDROCHLORIDE 100 MCG: 10 INJECTION INTRAVENOUS at 08:59

## 2019-01-01 RX ADMIN — ROCURONIUM BROMIDE 40 MG: 10 INJECTION, SOLUTION INTRAVENOUS at 14:37

## 2019-01-01 RX ADMIN — METRONIDAZOLE 500 MG: 500 TABLET, FILM COATED ORAL at 04:57

## 2019-01-01 RX ADMIN — SUGAMMADEX 200 MG: 100 INJECTION, SOLUTION INTRAVENOUS at 15:54

## 2019-01-01 RX ADMIN — METOCLOPRAMIDE 10 MG: 5 INJECTION, SOLUTION INTRAMUSCULAR; INTRAVENOUS at 08:46

## 2019-01-01 RX ADMIN — SODIUM CHLORIDE, POTASSIUM CHLORIDE, SODIUM LACTATE AND CALCIUM CHLORIDE: 600; 310; 30; 20 INJECTION, SOLUTION INTRAVENOUS at 15:35

## 2019-01-01 RX ADMIN — FLUCONAZOLE 200 MG: 200 TABLET ORAL at 05:39

## 2019-01-01 RX ADMIN — IPRATROPIUM BROMIDE 0.5 MG: 0.5 SOLUTION RESPIRATORY (INHALATION) at 17:21

## 2019-01-01 RX ADMIN — MAGNESIUM HYDROXIDE 30 ML: 400 SUSPENSION ORAL at 08:56

## 2019-01-01 RX ADMIN — METRONIDAZOLE 500 MG: 500 TABLET, FILM COATED ORAL at 05:39

## 2019-01-01 RX ADMIN — MORPHINE SULFATE 2 MG: 4 INJECTION INTRAVENOUS at 22:55

## 2019-01-01 RX ADMIN — MORPHINE SULFATE 2 MG: 4 INJECTION INTRAVENOUS at 04:03

## 2019-01-01 RX ADMIN — LIDOCAINE HYDROCHLORIDE 50 MG: 20 INJECTION, SOLUTION INTRAVENOUS at 14:30

## 2019-01-01 RX ADMIN — METRONIDAZOLE 500 MG: 500 TABLET, FILM COATED ORAL at 17:36

## 2019-01-01 RX ADMIN — FENTANYL CITRATE 75 MCG: 50 INJECTION, SOLUTION INTRAMUSCULAR; INTRAVENOUS at 14:51

## 2019-01-01 RX ADMIN — LISINOPRIL 5 MG: 5 TABLET ORAL at 05:27

## 2019-01-01 RX ADMIN — CEPHALEXIN 500 MG: 500 CAPSULE ORAL at 19:08

## 2019-01-01 RX ADMIN — ALBUTEROL SULFATE 2.5 MG: 2.5 SOLUTION RESPIRATORY (INHALATION) at 17:21

## 2019-01-01 RX ADMIN — FENTANYL CITRATE 100 MCG: 50 INJECTION, SOLUTION INTRAMUSCULAR; INTRAVENOUS at 09:30

## 2019-01-01 RX ADMIN — METRONIDAZOLE 500 MG: 500 TABLET, FILM COATED ORAL at 06:57

## 2019-01-01 SDOH — ECONOMIC STABILITY: GENERAL

## 2019-01-01 SDOH — ECONOMIC STABILITY: HOUSING INSECURITY

## 2019-01-01 SDOH — ECONOMIC STABILITY: HOUSING INSECURITY
HOME SAFETY: LIVES ALONE AND PENDING PLACEMENT AS PATIENT IS GETTING WEAKER AND MORE ILL NEEDS ASSIST WITH CARE. MSW FOUND A PLACE FOR PLACEMENT EITHER TUES OR WEDNESDAY OF THIS WEEK.

## 2019-01-01 SDOH — ECONOMIC STABILITY: HOUSING INSECURITY: HOME SAFETY: NO SAFETY CONCERNS AT THIS TIME AS PATIENT IS INDEPENDENT THUS FAR AND HAS ASSIST WHEN NEEDED.

## 2019-01-01 ASSESSMENT — ENCOUNTER SYMPTOMS
BLOOD IN STOOL: 0
WHEEZING: 0
HEADACHES: 0
SLEEP QUALITY: ADEQUATE
MYALGIAS: 0
WEIGHT LOSS: 1
PALPITATIONS: 0
CARDIOVASCULAR NEGATIVE: 1
WHEEZING: 0
STOOL FREQUENCY: TWICE DAILY
DIZZINESS: 0
WEAKNESS: 1
DIZZINESS: 0
NERVOUS/ANXIOUS: 0
EYE PAIN: 0
DIZZINESS: 0
COUGH: 0
SPEECH CHANGE: 0
FEVER: 0
COUGH: 0
FEVER: 0
WEAKNESS: 1
PALPITATIONS: 0
DEPRESSION: 0
SHORTNESS OF BREATH: 0
NECK PAIN: 0
ABDOMINAL PAIN: 1
VOMITING: 0
PND: 0
ABDOMINAL PAIN: 0
SLEEP QUALITY: FAIR
FATIGUE: 1
WEIGHT LOSS: 1
FALLS: 0
NAUSEA: 1
DIARRHEA: 0
NECK PAIN: 0
DIARRHEA: 0
CHILLS: 0
SLEEP QUALITY: ADEQUATE
NAUSEA: 0
DEPRESSION: 0
WEAKNESS: 1
COUGH: 1
FATIGUE: 1
LAST BOWEL MOVEMENT: 65351
CHILLS: 0
FATIGUES EASILY: 1
HYPERTENSION: 1
FATIGUE: 1
FATIGUE: 1
LAST BOWEL MOVEMENT: 65328
DIARRHEA: 0
BLURRED VISION: 0
FLATUS: 1
BLOOD IN STOOL: 0
FALLS: 0
FALLS: 0
FATIGUE: 1
TINGLING: 0
NECK PAIN: 0
HEMOPTYSIS: 0
LAST BOWEL MOVEMENT: 65301
CLAUDICATION: 0
FLATUS: 1
SLEEP QUALITY: FAIR
DIZZINESS: 0
NECK PAIN: 0
CONSTIPATION: 0
DIZZINESS: 0
FATIGUES EASILY: 1
VOMITING: 0
FATIGUES EASILY: 1
SEIZURES: 0
FOCAL WEAKNESS: 0
NAUSEA: 1
LOSS OF CONSCIOUSNESS: 0
SLEEP QUALITY: FAIR
LAST BOWEL MOVEMENT: 65334
STOOL FREQUENCY: DAILY
HEADACHES: 0
FLATUS: 1
SENSORY CHANGE: 0
FEVER: 0
WEAKNESS: 1
FALLS: 0
SLEEP QUALITY: FAIR
SENSORY CHANGE: 0
STOOL FREQUENCY: LESS THAN DAILY
FATIGUES EASILY: 1
COUGH: 0
DRY SKIN: 1
SPUTUM PRODUCTION: 0
SLEEP QUALITY: FAIR
ABDOMINAL PAIN: 0
DEPRESSION: 0
LAST BOWEL MOVEMENT: 65282
CONSTIPATION: 0
DIARRHEA: 0
SLEEP QUALITY: POOR
NAUSEA: 1
SPUTUM PRODUCTION: 0
FATIGUES EASILY: 1
DEPRESSION: 0
BACK PAIN: 0
PALPITATIONS: 0
DEPRESSED MOOD: 1
PND: 0
LAST BOWEL MOVEMENT: 65362
FLATUS: 1
STOOL FREQUENCY: LESS THAN DAILY
ABDOMINAL PAIN: 1
WEAKNESS: 1
NAUSEA: 1
COUGH: 0
FEVER: 0
PALPITATIONS: 0
BLURRED VISION: 0
BLOOD IN STOOL: 0
NAUSEA: 0
PALPITATIONS: 0
SLEEP QUALITY: FAIR
SLEEP QUALITY: FAIR
SPEECH CHANGE: 0
PND: 0
CHILLS: 0
WHEEZING: 0
FLATUS: 1
VOMITING: 0
SORE THROAT: 0
BLURRED VISION: 0
TREMORS: 0
BLURRED VISION: 0
VOMITING: 0
PALPITATIONS: 1
STOOL FREQUENCY: DAILY
ABDOMINAL PAIN: 0
HEARTBURN: 0
SPUTUM PRODUCTION: 0
NAUSEA: 1
ABDOMINAL PAIN: 1
FLATUS: 1
FLATUS: 1
STOOL FREQUENCY: DAILY
ORTHOPNEA: 0
AGITATION: 1
ABDOMINAL PAIN: 0
BOWEL PATTERN NORMAL: 1
DIARRHEA: 0
WEIGHT LOSS: 1
SEIZURES: 0
SHORTNESS OF BREATH: 0
PND: 0
COUGH: 0
EYE PAIN: 0
ABDOMINAL PAIN: 0
FATIGUES EASILY: 1
ADDITIONAL INFORMATION: PAIN
FEVER: 0
CHILLS: 0
SLEEP QUALITY: FAIR
RESPIRATORY NEGATIVE: 1
EYE PAIN: 0
FEVER: 0
BOWEL PATTERN NORMAL: 1
WHEEZING: 0
STOOL FREQUENCY: DAILY
CONSTIPATION: 1
BACK PAIN: 0
WHEEZING: 0
HEARTBURN: 0
SPEECH CHANGE: 0
TREMORS: 0
DIARRHEA: 0
SLEEP QUALITY: FAIR
BACK PAIN: 0
FEVER: 0
ORTHOPNEA: 0
SPUTUM PRODUCTION: 0
DIZZINESS: 0
LAST BOWEL MOVEMENT: 65353
ABDOMINAL PAIN: 1
SLEEP QUALITY: ADEQUATE
NAUSEA: 1
FATIGUE: 1
DEPRESSED MOOD: 1
ABDOMINAL PAIN: 1
FLATUS: 1
SLEEP QUALITY: ADEQUATE
FEVER: 0
SLEEP QUALITY: FAIR
SLEEP QUALITY: ADEQUATE
DEPRESSION: 0
SHORTNESS OF BREATH: 0
FALLS: 0
STOOL FREQUENCY: LESS THAN DAILY
VOMITING: 0
CONSTIPATION: 1
HALLUCINATIONS: 0
ABDOMINAL PAIN: 1
FOCAL WEAKNESS: 0
NAUSEA: 0
VOMITING: 0
CONSTIPATION: 0
FLATUS: 1
FLATUS: 1
COUGH: 1
FATIGUE: 1
DRY SKIN: 1
ABDOMINAL PAIN: 1
WEAKNESS: 1
VOMITING: 0
FALLS: 0
SHORTNESS OF BREATH: 0
NECK PAIN: 0
EYE PAIN: 0
AGITATION: 1
NAUSEA: DENIES
HEADACHES: 0
NAUSEA: 0
HEMOPTYSIS: 0
VOMITING: 0
ABDOMINAL PAIN: 1
BACK PAIN: 0
DEPRESSION: 0
FALLS: 0
FATIGUE: 1
TREMORS: 0
STOOL FREQUENCY: LESS THAN DAILY
STOOL FREQUENCY: LESS THAN DAILY
HEARTBURN: 0
CHILLS: 0
DIZZINESS: 0
CLAUDICATION: 0
PALPITATIONS: 0
LAST BOWEL MOVEMENT: 65322
WEIGHT LOSS: 1
BLOOD IN STOOL: 0
LAST BOWEL MOVEMENT: 65344
DYSPNEA ON EXERTION: 1
FATIGUES EASILY: 1
BACK PAIN: 0
STOOL FREQUENCY: LESS THAN DAILY
NAUSEA: 0
LAST BOWEL MOVEMENT: 65356
ABDOMINAL PAIN: 1
BACK PAIN: 0
PALPITATIONS: 0
EYE PAIN: 0
CHILLS: 0
FATIGUES EASILY: 1
DIARRHEA: 0
CONSTIPATION: 0
BOWEL PATTERN NORMAL: 1
SLEEP QUALITY: ADEQUATE
HEARTBURN: 0
SLEEP QUALITY: ADEQUATE
SLEEP QUALITY: FAIR
NAUSEA: 0
LAST BOWEL MOVEMENT: 65315
CONSTIPATION: 1
SHORTNESS OF BREATH: 0
DOUBLE VISION: 0
BACK PAIN: 0
LAST BOWEL MOVEMENT: 65355
SEIZURES: 0
MYALGIAS: 0
BLURRED VISION: 0
PALPITATIONS: 0
CONSTIPATION: 1
NAUSEA: 0
HEADACHES: 0
DEPRESSION: 0
ABDOMINAL PAIN: 1
FLATUS: 1
NAUSEA: 0
SLEEP QUALITY: ADEQUATE
EYE PAIN: 0
MYALGIAS: 0
SLEEP QUALITY: FAIR
SLEEP QUALITY: FAIR
LAST BOWEL MOVEMENT: 65354
SHORTNESS OF BREATH: 0
FATIGUE: 1
VOMITING: 0
FATIGUE: 1
SLEEP QUALITY: ADEQUATE
TREMORS: 0
CONSTIPATION: 0
FATIGUES EASILY: 1
HEADACHES: 0
FLATUS: 1
DEPRESSED MOOD: 1
WEIGHT LOSS: 1
ABDOMINAL PAIN: 0
CONSTIPATION: 0
SHORTNESS OF BREATH: T
DEPRESSED MOOD: 1
CHILLS: 0
HEADACHES: 0
SENSORY CHANGE: 0
DOUBLE VISION: 0
BOWEL PATTERN NORMAL: 1
HYPERTENSION: 1
ABDOMINAL PAIN: 0
NECK PAIN: 0
FEVER: 0
ONSET QUALITY: INTERMITTENT
COUGH: 0
SLEEP QUALITY: ADEQUATE
WEAKNESS: 0
HEADACHES: 0
CHILLS: 0
COUGH: 1
DRY SKIN: 1
BLURRED VISION: 0
SLEEP QUALITY: FAIR
ORTHOPNEA: 0
STOOL FREQUENCY: DAILY
DEPRESSED MOOD: 1
HEADACHES: 0
ABDOMINAL PAIN: 1
VOMITING: DENIES
DIARRHEA: 0
PND: 0
COUGH: 0
FATIGUES EASILY: 1
VOMITING: DENIES EMESIS
HYPERTENSION: 1
STOOL FREQUENCY: DAILY
FLATUS: 1
DOUBLE VISION: 0
WEIGHT LOSS: 0
WHEEZING: 0
DRY SKIN: 1
NAUSEA: 0
EYE PAIN: 0
DOUBLE VISION: 0
CONSTIPATION: 1
WHEEZING: 0
SPUTUM PRODUCTION: 0
FEVER: 0
STOOL FREQUENCY: DAILY
SHORTNESS OF BREATH: 0
WEIGHT LOSS: 1
DIZZINESS: 0
LAST BOWEL MOVEMENT: 65308
FALLS: 0
SHORTNESS OF BREATH: 0
HEARTBURN: 0
LAST BOWEL MOVEMENT: 65280
VOMITING: 0
WEAKNESS: 1
NERVOUS/ANXIOUS: 1
FATIGUES EASILY: 1
SLEEP QUALITY: ADEQUATE
DEPRESSED MOOD: 1
CHILLS: 0
FATIGUES EASILY: 1
SLEEP QUALITY: ADEQUATE
ABDOMINAL PAIN: 1
MYALGIAS: 0
SPUTUM PRODUCTION: 0
SLEEP QUALITY: ADEQUATE
STOOL FREQUENCY: LESS THAN DAILY
CONSTIPATION: 1
HEARTBURN: 0
FATIGUE: 1
BACK PAIN: 0
AGITATION: 1
SPUTUM PRODUCTION: 0
DEPRESSED MOOD: 1

## 2019-01-01 ASSESSMENT — COGNITIVE AND FUNCTIONAL STATUS - GENERAL
DAILY ACTIVITIY SCORE: 24
STANDING UP FROM CHAIR USING ARMS: A LITTLE
MOVING TO AND FROM BED TO CHAIR: A LITTLE
STANDING UP FROM CHAIR USING ARMS: A LITTLE
CLIMB 3 TO 5 STEPS WITH RAILING: A LITTLE
MOBILITY SCORE: 21
SUGGESTED CMS G CODE MODIFIER DAILY ACTIVITY: CH
DAILY ACTIVITIY SCORE: 24
SUGGESTED CMS G CODE MODIFIER MOBILITY: CK
WALKING IN HOSPITAL ROOM: A LITTLE
MOVING FROM LYING ON BACK TO SITTING ON SIDE OF FLAT BED: A LITTLE
TURNING FROM BACK TO SIDE WHILE IN FLAT BAD: A LITTLE
SUGGESTED CMS G CODE MODIFIER MOBILITY: CJ
SUGGESTED CMS G CODE MODIFIER DAILY ACTIVITY: CH
DAILY ACTIVITIY SCORE: 24
MOBILITY SCORE: 20
SUGGESTED CMS G CODE MODIFIER MOBILITY: CJ
MOVING TO AND FROM BED TO CHAIR: A LITTLE
CLIMB 3 TO 5 STEPS WITH RAILING: A LITTLE
MOVING FROM LYING ON BACK TO SITTING ON SIDE OF FLAT BED: A LITTLE
SUGGESTED CMS G CODE MODIFIER DAILY ACTIVITY: CH
WALKING IN HOSPITAL ROOM: A LITTLE
SUGGESTED CMS G CODE MODIFIER MOBILITY: CH
STANDING UP FROM CHAIR USING ARMS: A LITTLE
MOBILITY SCORE: 24
MOBILITY SCORE: 18
CLIMB 3 TO 5 STEPS WITH RAILING: A LITTLE

## 2019-01-01 ASSESSMENT — ACTIVITIES OF DAILY LIVING (ADL)
BATHING_REQUIRES_ASSISTANCE: 1
MONEY MANAGEMENT (EXPENSES/BILLS): INDEPENDENT
HOME_HEALTH_OASIS: 01
OASIS_M1830: 03
MONEY MANAGEMENT (EXPENSES/BILLS): NEEDS ASSISTANCE
MONEY MANAGEMENT (EXPENSES/BILLS): INDEPENDENT
MONEY MANAGEMENT (EXPENSES/BILLS): INDEPENDENT
TOILETING: INDEPENDENT
MONEY MANAGEMENT (EXPENSES/BILLS): INDEPENDENT
OASIS_M1830: 03
MONEY MANAGEMENT (EXPENSES/BILLS): INDEPENDENT

## 2019-01-01 ASSESSMENT — SOCIAL DETERMINANTS OF HEALTH (SDOH)
ACTIVE STRESSOR - EXPRESSED EMOTIONAL NEED: 1
ACTIVE STRESSOR - EXPRESSED EMOTIONAL NEED: 1
ACTIVE STRESSOR - NO STRESS FACTORS: 1
EXPRESSED_FINANCIAL_NEED: 1
ACTIVE STRESSOR - HEALTH CHANGES: 1
NEEDS HELP WITH INCOME CONCERNS: Y
EXPRESSED_FINANCIAL_NEED: 1
ACTIVE STRESSOR - LOSS OF CONTROL: 1
FINANCIAL_CONCERNS: 1
EXPRESSED_FINANCIAL_NEED: 1
EXPRESSED_FINANCIAL_NEED: 1
ACTIVE STRESSOR - NO STRESS FACTORS: 1
ACTIVE STRESSOR - LOSS OF CONTROL: 1
ACTIVE STRESSOR - EXPRESSED EMOTIONAL NEED: 1
FINANCIAL_CONCERNS: 1
HAS ADEQUATE INCOME: N
EXPRESSED_FINANCIAL_NEED: 1
ACTIVE STRESSOR - HEALTH CHANGES: 1
EXPRESSED_FINANCIAL_NEED: 1
FINANCIAL_CONCERNS: 1
ACTIVE STRESSOR - HEALTH CHANGES: 1
IS CONCERNED ABOUT INCOME: Y
FINANCIAL_CONCERNS: 1
ACTIVE STRESSOR - HEALTH CHANGES: 1
EXPRESSED_FINANCIAL_NEED: 1
ACTIVE STRESSOR - HEALTH CHANGES: 1
FINANCIAL_CONCERNS: 1
FINANCIAL_CONCERNS: 1
ACTIVE STRESSOR - HEALTH CHANGES: 1
FINANCIAL_CONCERNS: 1
EXPRESSED_FINANCIAL_NEED: 1
ACTIVE STRESSOR - HEALTH CHANGES: 1
FINANCIAL_CONCERNS: 1
EXPRESSED_FINANCIAL_NEED: 1
ACTIVE STRESSOR - HEALTH CHANGES: 1
FINANCIAL_CONCERNS: 1
FINANCIAL_CONCERNS: 1
EXPRESSED_FINANCIAL_NEED: 1
ACTIVE STRESSOR - EXPRESSED EMOTIONAL NEED: 1
FINANCIAL_CONCERNS: 1
ACTIVE STRESSOR - HEALTH CHANGES: 1
EXPRESSED_FINANCIAL_NEED: 1
ACTIVE STRESSOR - HEALTH CHANGES: 1

## 2019-01-01 ASSESSMENT — PATIENT HEALTH QUESTIONNAIRE - PHQ9
SUM OF ALL RESPONSES TO PHQ9 QUESTIONS 1 AND 2: 0
1. LITTLE INTEREST OR PLEASURE IN DOING THINGS: NOT AT ALL
2. FEELING DOWN, DEPRESSED, IRRITABLE, OR HOPELESS: NOT AT ALL
1. LITTLE INTEREST OR PLEASURE IN DOING THINGS: NOT AT ALL
CLINICAL INTERPRETATION OF PHQ2 SCORE: 0
2. FEELING DOWN, DEPRESSED, IRRITABLE, OR HOPELESS: NOT AT ALL
1. LITTLE INTEREST OR PLEASURE IN DOING THINGS: 00
2. FEELING DOWN, DEPRESSED, IRRITABLE, OR HOPELESS: 00
SUM OF ALL RESPONSES TO PHQ9 QUESTIONS 1 AND 2: 0
CLINICAL INTERPRETATION OF PHQ2 SCORE: 0

## 2019-01-01 ASSESSMENT — GAIT ASSESSMENTS
DISTANCE (FEET): 30
GAIT LEVEL OF ASSIST: SUPERVISED
DEVIATION: BRADYKINETIC;ANTALGIC
DEVIATION: BRADYKINETIC
DISTANCE (FEET): 125
ASSISTIVE DEVICE: FRONT WHEEL WALKER
GAIT LEVEL OF ASSIST: MINIMAL ASSIST
ASSISTIVE DEVICE: FRONT WHEEL WALKER

## 2019-01-01 ASSESSMENT — LIFESTYLE VARIABLES
SUBSTANCE_ABUSE: 0
SUBSTANCE_ABUSE: 0
EVER_SMOKED: YES
EVER_SMOKED: YES
SUBSTANCE_ABUSE: 0
DO YOU DRINK ALCOHOL: NO
SUBSTANCE_ABUSE: 0
SUBSTANCE_ABUSE: 0
DO YOU DRINK ALCOHOL: NO

## 2019-01-01 ASSESSMENT — PAIN SCALES - GENERAL
PAIN_LEVEL: 2
PAINLEVEL: 4=SLIGHT-MODERATE PAIN
PAIN_LEVEL: 0
PAINLEVEL: NO PAIN

## 2019-02-06 NOTE — ED NOTES
Patient given d/c instructions, placed in knee immobilizer. Verbalized understanding. D/c'd home with son. VSS.

## 2019-02-06 NOTE — ED PROVIDER NOTES
ED Provider Note    Scribed for Ramy Boss M.D. by David eMtzger. 2/6/2019  9:58 AM    Primary care provider: Monica Rodriguez M.D.  Means of arrival: EMS  History obtained from: Patient   History limited by: None     CHIEF COMPLAINT  Chief Complaint   Patient presents with   • GLF       HPI  Katelyn Hill is a 83 y.o. female who presents to the Emergency Department for fall just prior to arrival. The patient states she was getting into a car and lost her balance landing on her right knee. She has a history of chronic pain in her right hip and states this is unchanged from her baseline however is now having right knee pain as well from the fall. She has not been able to bear weight on the right extremity since the fall this morning. Denies any head injury, loss of consciousness, headache, nausea, vomiting, numbness, tingling.     REVIEW OF SYSTEMS  Pertinent positives include fall, right knee pain, chronic right hip pain. Pertinent negatives include no head injury, loss of consciousness, headache, nausea, vomiting, numbness, tingling. .  All other systems reviewed and negative.    PAST MEDICAL HISTORY   has a past medical history of Dyslipidemia; Hypertension; Kidney calculi; Lactose intolerance; Lung nodules; MEDICAL HOME; OA (osteoarthritis); Raynaud's disease /phenomenon; and Torn rotator cuff (2008).    SURGICAL HISTORY   has a past surgical history that includes shoulder arthroscopy; appendectomy (1943); tonsillectomy; and knee arthroscopy.    SOCIAL HISTORY  Social History   Substance Use Topics   • Smoking status: Former Smoker     Packs/day: 1.00     Years: 30.00     Types: Cigarettes     Quit date: 1/1/1985   • Smokeless tobacco: Never Used      Comment: 1 ppd for 30 years, [id number]   • Alcohol use No      History   Drug Use No       FAMILY HISTORY  Family History   Problem Relation Age of Onset   • Heart Disease Mother    • Heart Disease Father         enlarged heart   • Diabetes Son     • Cancer Maternal Grandmother         suicide    • Cancer Maternal Grandfather    • Cancer Paternal Grandmother         Lung cancer        CURRENT MEDICATIONS  No current facility-administered medications on file prior to encounter.      Current Outpatient Prescriptions on File Prior to Encounter   Medication Sig Dispense Refill   • NON SPECIFIED Please provide Lactose free Protein shakes ( Ensure or Boost ) 20 Each 2   • lisinopril (PRINIVIL) 5 MG Tab Take 1 Tab by mouth every day. 90 Tab 1   • Naproxen Sodium (ALEVE) 220 MG Cap Take 1 Cap by mouth 1 time daily as needed. 30 Cap 0   • metoprolol SR (TOPROL XL) 25 MG TABLET SR 24 HR Take 1 Tab by mouth every day. 90 Tab 3     ALLERGIES  Allergies   Allergen Reactions   • Iodine Anaphylaxis and Swelling   • Aspirin    • Clindamycin    • Penicillins    • Robitussin [Guiatuss] Diarrhea   • Shellfish Allergy    • Tetanus Toxoid    • Zithromax [Azithromycin Dihydrate]        PHYSICAL EXAM  VITAL SIGNS: BP (!) 172/85   Pulse 78   Temp 36.2 °C (97.2 °F) (Temporal)   Resp 18   Wt 50 kg (110 lb 3.7 oz)   LMP 05/01/1985   SpO2 96%   BMI 18.34 kg/m²     Constitutional: Well developed, Well nourished, Mild distress, Non-toxic appearance.   HENT: Normocephalic, Atraumatic, Bilateral external ears normal, Oropharynx moist, No oral exudates.   Eyes: PERRLA, EOMI, Conjunctiva normal, No discharge.   Neck: No tenderness, Supple, No stridor.   Lymphatic: No lymphadenopathy noted.   Cardiovascular: Normal heart rate, Normal rhythm.   Thorax & Lungs: Clear to auscultation bilaterally, No respiratory distress, No wheezing, No crackles.   Abdomen: Soft, No tenderness, No masses, No pulsatile masses.   Skin: Warm, Dry, No erythema, No rash.   Extremities:, No edema No cyanosis.   Musculoskeletal: Tenderness with palpation of the right hip and groin. Additionally tenderness to the right knee with range of motion.  Intact distal pulses  Neurologic: Awake, alert. Moves all  extremities spontaneously.  Psychiatric: Affect normal, Judgment normal, Mood normal.     RADIOLOGY  DX-HIP-UNILATERAL-WITH PELVIS-1 VIEW RIGHT   Final Result      1.  No acute fracture is identified.      2.  Severe osteoarthropathy of the right hip joint.      3.  Mild to moderate osteoarthropathy of the left hip joint.      DX-KNEE COMPLETE 4+ RIGHT   Final Result      1.  No acute fracture identified.      2.  Osteopenia.        The radiologist's interpretation of all radiological studies have been reviewed by me.      COURSE & MEDICAL DECISION MAKING  Pertinent Labs & Imaging studies reviewed. (See chart for details)    9:58 AM - Patient seen and examined at bedside. Patient will be treated with Motrin 400 mg and Tylenol 650 mg. Ordered Right hip and pelvis x-ray and right knee x-ray to evaluate her symptoms. The patient was aware and agreeable with her plan of care.     11:48 AM - Patient informed that her x-rays were negative for any fracture but showed severe arthritis. I offered a brace for the time being to help with her knee and she was agreeable. Knee immobilizer will be provided and the patient will be ambulated. Patient additionally notes intermittent episodes of urinary incontinence however this is not a new symptom and is followed by her primary care.     1:39 - She was able to successfully ambulate with the assistance of her knee immobilizer. She was advised to use her Aleve as well as Tylenol and Motrin for pain as needed. She was agreeable with her care today and discharge home.     Decision Making:  Status post fall, x-rays are negative, will put the patient in a knee immobilizer, urinalysis was negative, the patient will be discharged home, have the patient return with any other concerns.    The patient will return for new or worsening symptoms and is stable at the time of discharge.    The patient is referred to a primary physician for blood pressure management, diabetic screening, and for all  other preventative health concerns.    DISPOSITION:  Patient will be discharged home in stable condition.    FOLLOW UP:  [facility], Emergency Dept  [address] [facility] [address] [phone]    If symptoms worsen      OUTPATIENT MEDICATIONS:  Discharge Medication List as of 2/6/2019  1:01 PM          FINAL IMPRESSION  1. Fall, initial encounter    2. Contusion of right knee, initial encounter    3. Hip arthritis          IDavid (Scribe), am scribing for, and in the presence of, Ramy Boss M.D..    Electronically signed by: David Metzger (Scribe), 2/6/2019    IRamy M.D. personally performed the services described in this documentation, as scribed by David Metzger in my presence, and it is both accurate and complete. E.     The note accurately reflects work and decisions made by me.  Ramy Boss  2/6/2019  4:26 PM

## 2019-02-06 NOTE — ED TRIAGE NOTES
Chief Complaint   Patient presents with   • GLF     Patient states she was getting into a car and lost her balance landing on right knee. Patient has knee and right groin pain. Denies hitting her head. NADIYA. AAOx4. ERP to fanny.

## 2019-03-19 NOTE — TELEPHONE ENCOUNTER
Was the patient seen in the last year in this department? Yes    Does patient have an active prescription for medications requested? No     Received Request Via: Pharmacy      Pt met protocol?: Yes    OV 11/18     BP Readings from Last 1 Encounters:   02/06/19 (!) 172/85

## 2019-04-08 NOTE — TELEPHONE ENCOUNTER
*PT NEEDS TO GET LABS DONE*  *PER NEW [employer] PROTOCOL, NEEDS TO BE DONE FOR 100DAYS SUPPLY*    Was the patient seen in the last year in this department? Yes    Does patient have an active prescription for medications requested? No     Received Request Via: Pharmacy      Pt met protocol?: No    LAST OV 11/20/2018    BP Readings from Last 1 Encounters:   02/06/19 (!) 172/85     Lab Results   Component Value Date/Time    CHOLSTRLTOT 132 04/28/2015 10:05 PM    LDL 70 04/28/2015 10:05 PM    HDL 54 04/28/2015 10:05 PM    TRIGLYCERIDE 38 04/28/2015 10:05 PM       Lab Results   Component Value Date/Time    SODIUM 140 11/02/2017 10:17 AM    POTASSIUM 4.9 11/02/2017 10:17 AM    CHLORIDE 102 11/02/2017 10:17 AM    CO2 30 11/02/2017 10:17 AM    GLUCOSE 92 11/02/2017 10:17 AM    BUN 16 11/02/2017 10:17 AM    CREATININE 0.52 11/02/2017 10:17 AM    CREATININE 0.71 01/19/2011 10:30 AM    BUNCREATRAT 27 (H) 01/19/2011 10:30 AM    GLOMRATE >59 01/19/2011 10:30 AM     Lab Results   Component Value Date/Time    ALKPHOSPHAT 67 11/02/2017 10:17 AM    ASTSGOT 20 11/02/2017 10:17 AM    ALTSGPT 10 11/02/2017 10:17 AM    TBILIRUBIN 0.7 11/02/2017 10:17 AM

## 2019-04-10 NOTE — TELEPHONE ENCOUNTER
*PT NEEDS TO GET LABS UPDATED*  *PER NEW [employer] PROTOCOL, NEEDS TO BE DONE FOR 100DAYS SUPPLY*    as the patient seen in the last year in this department? Yes    Does patient have an active prescription for medications requested? No     Received Request Via: Pharmacy      Pt met protocol?: No   LAST OV 11/20/2018    BP Readings from Last 1 Encounters:   02/06/19 (!) 172/85     Lab Results   Component Value Date/Time    CHOLSTRLTOT 132 04/28/2015 10:05 PM    LDL 70 04/28/2015 10:05 PM    HDL 54 04/28/2015 10:05 PM    TRIGLYCERIDE 38 04/28/2015 10:05 PM       Lab Results   Component Value Date/Time    SODIUM 140 11/02/2017 10:17 AM    POTASSIUM 4.9 11/02/2017 10:17 AM    CHLORIDE 102 11/02/2017 10:17 AM    CO2 30 11/02/2017 10:17 AM    GLUCOSE 92 11/02/2017 10:17 AM    BUN 16 11/02/2017 10:17 AM    CREATININE 0.52 11/02/2017 10:17 AM    CREATININE 0.71 01/19/2011 10:30 AM    BUNCREATRAT 27 (H) 01/19/2011 10:30 AM    GLOMRATE >59 01/19/2011 10:30 AM     Lab Results   Component Value Date/Time    ALKPHOSPHAT 67 11/02/2017 10:17 AM    ASTSGOT 20 11/02/2017 10:17 AM    ALTSGPT 10 11/02/2017 10:17 AM    TBILIRUBIN 0.7 11/02/2017 10:17 AM

## 2019-05-08 NOTE — ED TRIAGE NOTES
Pt to triage in WC  Chief Complaint   Patient presents with   • Sore Throat   • Cough     +phlegm   • Dry Mouth     Symptoms x4d. Since starting Metoprolol. Pt goes on to c/o epigastric pain and a burning sensation.  EKG called to triage.

## 2019-05-08 NOTE — ED PROVIDER NOTES
ED Provider Note    Scribed for Thomas Johansen M.D. by Yuliana Adams. 5/8/2019  4:18 PM    Primary care provider: Monica Rodriguez M.D.  Means of arrival: wheel chair  History obtained from: patient  History limited by: none    CHIEF COMPLAINT  Chief Complaint   Patient presents with   • Sore Throat   • Cough     +phlegm   • Dry Mouth       HPI  Katelyn Hill is a 84 y.o. female who presents to the Emergency Department for evaluation of upper respiratory symptoms onset 5 days ago including productive cough with green sputum production, congestion, and sore throat. No complaints of fever, chest pain, or shortness of breath with this current illness. Patient denies any diagnosed respiratory problems such as asthma or COPD, however, she is a previous smoker, 30 year history. She denies any history of blood clot such as PE or DVT, no history of diabetes. Patient does note a history of a nodule in her left lung that was discovered several years ago. She has had this evaluated, with no concerns at that time, and undergoes repeat chest xrays with no changes. No history of CHF or other cardiac problems. Patient is up to date with vaccinations including influenza and pneumonia. Patient denies any recent sick contacts. No complaints of bloody stools, urinary symptoms, nausea, vomiting, diarrhea, abdominal pain. No blood thinners on board.    REVIEW OF SYSTEMS  Pertinent positives include: cough, sputum production, congestion, sore throat.  Pertinent negatives include: fever, chest pain, shortness of breath, bloody stools, urinary symptoms, nausea, vomiting, diarrhea, abdominal pain.  10+ systems reviewed and negative.      PAST MEDICAL HISTORY  Past Medical History:   Diagnosis Date   • Dyslipidemia    • Hypertension    • Kidney calculi    • Lactose intolerance    • Lung nodules     ashley Correa, recommend yearly chest xray   • MEDICAL HOME    • OA (osteoarthritis)     right knee, shoulder   • Raynaud's  "disease /phenomenon    • Torn rotator cuff 2008    left, Dr. Montalvo       FAMILY HISTORY  Family History   Problem Relation Age of Onset   • Heart Disease Mother    • Heart Disease Father         enlarged heart   • Diabetes Son    • Cancer Maternal Grandmother         suicide    • Cancer Maternal Grandfather    • Cancer Paternal Grandmother         Lung cancer        SOCIAL HISTORY  Social History   Substance Use Topics   • Smoking status: Former Smoker     Packs/day: 1.00     Years: 30.00     Types: Cigarettes     Quit date: 1/1/1985   • Smokeless tobacco: Never Used      Comment: 1 ppd for 30 years, [id number]   • Alcohol use No     History   Drug Use No       SURGICAL HISTORY  Past Surgical History:   Procedure Laterality Date   • APPENDECTOMY  1943   • KNEE ARTHROSCOPY      right   • SHOULDER ARTHROSCOPY      right   • TONSILLECTOMY         CURRENT MEDICATIONS  •  lisinopril (PRINIVIL) 5 MG Tab, Take 1 Tab by mouth every day., Disp: 100 Tab, Rfl: 0  •  metoprolol SR (TOPROL XL) 25 MG TABLET SR 24 HR, Take 1 Tab by mouth every day., Disp: 90 Tab, Rfl: 0  •  NON SPECIFIED, Please provide Lactose free Protein shakes ( Ensure or Boost ), Disp: 20 Each, Rfl: 2  •  Naproxen Sodium (ALEVE) 220 MG Cap, Take 1 Cap by mouth 1 time daily as needed., Disp: 30 Cap, Rfl: 0    ALLERGIES  Allergies   Allergen Reactions   • Iodine Anaphylaxis and Swelling   • Aspirin    • Clindamycin    • Penicillins    • Robitussin [Guiatuss] Diarrhea   • Shellfish Allergy    • Tetanus Toxoid    • Zithromax [Azithromycin Dihydrate]        PHYSICAL EXAM  VITAL SIGNS: /74   Pulse 72   Temp 36.3 °C (97.3 °F) (Temporal)   Resp 16   Ht 1.6 m (5' 3\")   Wt 57 kg (125 lb 10.6 oz)   LMP 05/01/1985   SpO2 95%   BMI 22.26 kg/m²   Reviewed and normal including afebrile and no hypoxia on room air    Constitutional: Well developed, Well nourished,elderly woman sitting comfortable up in bed. Emphysematous body build  HENT: Normocephalic, " atraumatic, bilateral external ears normal, oropharynx dry, No exudates or erythema.   Eyes: PERRLA, conjunctiva pink, no scleral icterus.   Cardiovascular: Regular rate and rhythm. No murmurs, rubs or gallops.   Respiratory:quiet breath sounds similar to emphysematous exam. No wheezes, rales, or rhonchi.   Abdominal:  Abdomen soft, non-tender, non distended. No rebound, or guarding.    Skin: No erythema, no rash.   Genitourinary: No costovertebral angle tenderness.   Musculoskeletal: no LE edema.   Neurologic: Alert & oriented x 3, cranial nerves 2-12 intact by passive exam.  No focal deficit noted.  Psychiatric: Affect normal, Judgment normal, Mood normal.     DIFFERENTIAL DIAGNOSIS:  COPD exacerbation, influenza, pneumonia, viral syndrome.    EKG Interpretation:  Interpreted by me   1st EKG  Rhythm:  Normal sinus rhythm   Rate: 80  Axis: normal  Ectopy: none  Conduction: LBBB  ST Segments: no acute change  T Waves: no acute change  Q Waves: none  Clinical Impression: NSR with LBBB new compared to 2015    2nd EKG  Rhythm:  Normal sinus rhythm  Rate: 65  Axis: normal  Ectopy: none  Conduction: LBBB  ST Segments: no acute change  T Waves: no acute change  Q Waves: none  Clinical Impression: NSR with LBBB new compared to 2015    RADIOLOGY/PROCEDURES  DX-CHEST-PORTABLE (1 VIEW)   Final Result      Hyperinflation. No focal consolidation or pleural effusions.        Radiologist interpretation have been reviewed by me.     LABORATORY:  Results for orders placed or performed during the hospital encounter of 05/08/19   CBC with Differential   Result Value Ref Range    WBC 4.9 4.8 - 10.8 K/uL    RBC 4.49 4.20 - 5.40 M/uL    Hemoglobin 13.7 12.0 - 16.0 g/dL    Hematocrit 44.1 37.0 - 47.0 %    MCV 98.2 (H) 81.4 - 97.8 fL    MCH 30.5 27.0 - 33.0 pg    MCHC 31.1 (L) 33.6 - 35.0 g/dL    RDW 47.0 35.9 - 50.0 fL    Platelet Count 198 164 - 446 K/uL    MPV 9.6 9.0 - 12.9 fL    Neutrophils-Polys 60.50 44.00 - 72.00 %    Lymphocytes  24.50 22.00 - 41.00 %    Monocytes 11.80 0.00 - 13.40 %    Eosinophils 2.40 0.00 - 6.90 %    Basophils 0.60 0.00 - 1.80 %    Immature Granulocytes 0.20 0.00 - 0.90 %    Nucleated RBC 0.00 /100 WBC    Neutrophils (Absolute) 2.98 2.00 - 7.15 K/uL    Lymphs (Absolute) 1.21 1.00 - 4.80 K/uL    Monos (Absolute) 0.58 0.00 - 0.85 K/uL    Eos (Absolute) 0.12 0.00 - 0.51 K/uL    Baso (Absolute) 0.03 0.00 - 0.12 K/uL    Immature Granulocytes (abs) 0.01 0.00 - 0.11 K/uL    NRBC (Absolute) 0.00 K/uL   Complete Metabolic Panel (CMP)   Result Value Ref Range    Sodium 139 135 - 145 mmol/L    Potassium 4.3 3.6 - 5.5 mmol/L    Chloride 101 96 - 112 mmol/L    Co2 32 20 - 33 mmol/L    Anion Gap 6.0 0.0 - 11.9    Glucose 90 65 - 99 mg/dL    Bun 20 8 - 22 mg/dL    Creatinine 0.55 0.50 - 1.40 mg/dL    Calcium 10.2 8.5 - 10.5 mg/dL    AST(SGOT) 19 12 - 45 U/L    ALT(SGPT) 12 2 - 50 U/L    Alkaline Phosphatase 73 30 - 99 U/L    Total Bilirubin 0.6 0.1 - 1.5 mg/dL    Albumin 4.4 3.2 - 4.9 g/dL    Total Protein 7.1 6.0 - 8.2 g/dL    Globulin 2.7 1.9 - 3.5 g/dL    A-G Ratio 1.6 g/dL   Troponin   Result Value Ref Range    Troponin I <0.01 0.00 - 0.04 ng/mL   Influenza A/B By PCR (Adult - Flu Only)   Result Value Ref Range    Influenza virus A RNA Negative Negative    Influenza virus B, PCR Negative Negative       Lab results reviewed by me.     INTERVENTIONS:  Medications   albuterol (PROVENTIL) 2.5mg/0.5ml nebulizer solution 2.5 mg (2.5 mg Nebulization Given 5/8/19 6536)   ipratropium (ATROVENT) 0.02 % nebulizer solution 0.5 mg (0.5 mg Nebulization Given 5/8/19 5314)     Response: improvement in cough and mild improvement in airflow and Tatian.    ED COURSE:  Nursing notes, VS, PMSFHx reviewed in chart.     4:18 PM - Patient seen and examined at bedside. Patient will be treated with nebulizer Proventil and Atrovent for her symptoms. Ordered chest xray, influenza by PCR, CBC, CMP, troponin and EKG to evaluate. I informed the patient that I  would evaluate for acute process with lab work and imaging, treating her with breathing treatments to see if this helps manage her symptoms. She understands and agrees with treatment plan.    6:12 PM I re-evaluated patient at bedside and updated her on her work up results. She reports feeling improved following the breathing treatments. I explained that she would be placed on an antibiotic to cover for bronchitis as well as on an albuterol inhaler to continue to manage the cough until her illness resolves. Patient would like to forgo the antibiotics and just try the inhaler to see if her illness will resolve on its own. I am comfortable with this and patient will be discharged with strict return precautions, should she develop any worsening symptoms, shortness of breath or fever. Patient understands and is agreeable with discharge at this time.    MEDICAL DECISION MAKING:  Well-appearing patient presents with a viral respiratory infection and possible emphysema.  There is no evidence of pneumonia.  PE would be unlikely.  There is no hypoxia.  Patient refuses antibiotics.    PLAN:  New Prescriptions    IPRATROPIUM-ALBUTEROL (COMBIVENT RESPIMAT)  MCG/ACT AERO SOLN    Inhale 1 Puff by mouth 4 times a day.     MDI spacer training  Emphysema handout given  Return for worsening cough, shortness of breath or fever    Monica Rodriguez M.D.  [facility] [address] [id number] [phone]    Schedule an appointment as soon as possible for a visit   As needed if not better Friday or Monday      CONDITION: fair.     FINAL IMPRESSION  1. Other emphysema (HCC)    2. Viral syndrome          I, Yuliana Adams ([place]), am scribing for, and in the presence of, Thomas Johansen M.D..    Electronically signed by: Yuliana Adams (Scribe), 5/8/2019    IThomas M.D. personally performed the services described in this documentation, as scribed by Yuliana Adams in my presence, and it is both accurate and  complete.    The note accurately reflects work and decisions made by me.  Thomas Johansen  5/8/2019  10:21 PM

## 2019-05-08 NOTE — ED NOTES
Rounded on pt sitting in senior lounge. Pt talking on phone to son.  Vitals reassessed. Apologized for wait time.  ED Tech confirms he attempted but was unable to obtain blood.

## 2019-05-09 NOTE — ED NOTES
Pt aware of plan of care. PIV initiated, blood drawn and sent to lab. Pt requested food. Educated on importance of waiting until results return.

## 2019-05-09 NOTE — ED NOTES
Reviewed discharge instructions, pt verbalized understanding of instructions and medication. States she will schedule follow-up appointment. Pt has ride home. Provided lunch box and assisted to lobby via w/c in stable condition.

## 2019-05-09 NOTE — FLOWSHEET NOTE
05/08/19 1723   Interdisciplinary Plan of Care-Goals (Indications)   Bronchodilator Indications Strong Subjective / Objective Improvement   Interdisciplinary Plan of Care-Outcomes    Bronchodilator Outcome Patient at Stable Baseline   Education   Education Yes - Pt. / Family has been Instructed in use of Respiratory Medications and Adverse Reactions   RT Assessment of Delivered Medications   Evaluation of Medication Delivery Daily Yes-- Pt /Family has been Instructed in use of Respiratory Medications and Adverse Reactions   SVN Group   #SVN Performed Yes   Given By: Mouthpiece   Date SVN Last Changed 05/08/19   Date SVN Next Change Due (Q 7 Days) 05/15/19   Respiratory WDL   Respiratory (WDL) X   Chest Exam   Respiration (!) 22   Pulse 71   Heart Rate (Monitored) (!) 132   Breath Sounds   Pre/Post Intervention Pre Intervention Assessment   RUL Breath Sounds Clear   RML Breath Sounds Clear   RLL Breath Sounds Diminished   LEVAR Breath Sounds Clear   LLL Breath Sounds Diminished   Oximetry   Continuous Oximetry Yes   O2 Alarms Set & Reviewed Yes   Oxygen   Pulse Oximetry 94 %   O2 (LPM) 0   FiO2% 21 %   O2 Daily Delivery Respiratory  Room Air with O2 Available

## 2019-05-09 NOTE — DISCHARGE INSTRUCTIONS
Chronic Obstructive Pulmonary Disease  (COPD)    Use Combivent 4 times a day for cough and sputum production.  Return for ill appearance or worsening shortness of breath.  See your doctor if not better in Friday or Monday.    You had an elevated or high normal blood pressure reading today.  This does not necessarily mean you have hypertension.  Please followup with your/a primary physician for comprehensive blood pressure evaluation.  BP Readings from Last 3 Encounters:   05/08/19 112/74   02/06/19 (!) 172/85   11/20/18 116/72   .    Your physician has diagnosed you as having chronic obstructive pulmonary disease (COPD). This is a process in which the air flow is restricted from leaving the lungs. It is also an end stage process of previous damage with the most common cause being smoking. It is essentially irreversible and treatment is mainly supportive. The lungs can never return to normal, but there are measures you can take which will improve them and make you feel better.    HOME CARE INSTRUCTIONS  If you smoke, STOP SMOKING. The carbon monoxide build-up in the blood robs you of your already short oxygen supply.  If antibiotics were prescribed, take as directed.  Avoid antihistamines and cough syrups. (They dry your system up and slow down the elimination of secretions. This decreases respiratory capacity and may lead to infections.)  Drink 8 large glasses of fluids per day. (This loosens secretions.)  Use humidifiers in home and at bedside if they do not make breathing difficult.  Receive all protective vaccines your caregiver suggests, especially pneumococcal and influenza.  Use home oxygen as suggested once started.    SEEK MEDICAL ATTENTION IF:  Sputum becomes purulent (infected looking).  An oral temperature that lasts 2 or more days above 102° F (38.9° C) or as your caregiver suggests, and is not controlled by medication.  Breathing is more labored or exercise tolerance is decreasing.  You are running out  of medicine you take for your breathing.    SEEK IMMEDIATE MEDICAL CARE IF:  You have rapid heart rate, agitation, confusion or stupor (family members may need to observe this), or tremors.  Any time it becomes difficult to breathe.  You develop chest pain.    Document Released: 09/27/2006  Document Re-Released: 10/04/2007  "[employer]® Patient Information ©2008 "[employer].

## 2019-05-13 NOTE — TELEPHONE ENCOUNTER
Future Appointments       Provider Department Center    5/14/2019 10:50 AM Nery Bowden D.O. [facility]        ESTABLISHED PATIENT PRE-VISIT PLANNING     Patient was contacted to complete PVP.     Note: Patient will not be contacted if there is no indication to call.     1.  Reviewed notes from the last few office visits within the medical group: Yes    2.  If any orders were placed at last visit or intended to be done for this visit (i.e. 6 mos follow-up), do we have Results/Consult Notes?        •  Labs - Labs ordered, NOT completed. Patient advised to complete prior to next appointment.       •  Imaging - Imaging ordered, completed and results are in chart.       •  Referrals - No referrals were ordered at last office visit.    3. Is this appointment scheduled as a Hospital Follow-Up? No    4.  Immunizations were updated in [facility] using WebIZ?: Yes       •  Web Iz Recommendations: TDAP, VARICELLA (Chicken Pox)  and SHINGRIX (Shingles)    5.  Patient is due for the following Health Maintenance Topics:   Health Maintenance Due   Topic Date Due   • IMM ZOSTER VACCINES (1 of 2) 04/06/1985   • Annual Wellness Visit  05/20/2015   • COLON CANCER SCREENING ANNUAL FIT  04/11/2019     6. Orders for overdue Health Maintenance topics pended in Pre-Charting? YES    7.  AHA (MDX) form printed for Provider? YES    8.  Patient was informed to arrive 15 min prior to their scheduled appointment and bring in their medication bottles.

## 2019-05-14 NOTE — PROGRESS NOTES
"CC: Hypertension    HISTORY OF THE PRESENT ILLNESS: Patient is a 84 y.o. female. This pleasant patient is here today to discuss the following health issues.    Hypertension: Patient is here for refills on lisinopril and metoprolol today.  She recently had lab work done in the emergency department as below showing a CMP which was appropriate.  She has been checking her blood pressures at home and states her top number is never above 119.  She is concerned about metoprolol causing \"phlegm\".  She feels as if ever since she got on the metoprolol she has had excess phlegm production, and this is been going on for many years.  There is some concern for possible emphysema as below.    Cough, shortness of breath: Patient recently seen in the emergency department on May 8, 2018 for cough.  She was thought to have viral bronchitis.  She was given a Combivent inhaler which she is been using 3 times a day.  The inhaler is working very well for her and she feels 100% at this time.  She was told in the emergency department that she has emphysema.  There is some hyperinflation on her lung x-rays.  She does have a 30-pack-year smoking history remotely.  Aside from from significant phlegm production as above, she denies chronic shortness of breath or cough.  She is never had any pulmonary function tests.  She is wondering if she needs to continue the Combivent indefinitely.    Allergies: Iodine; Aspirin; Clindamycin; Penicillins; Robitussin [guiatuss]; Shellfish allergy; Tetanus toxoid; and Zithromax [azithromycin dihydrate]    Current Outpatient Prescriptions Ordered in [facility]   Medication Sig Dispense Refill   • lisinopril (PRINIVIL) 5 MG Tab Take 1 Tab by mouth every day. 100 Tab 0   • ipratropium-albuterol (COMBIVENT RESPIMAT)  MCG/ACT Aero Soln Inhale 1 Puff by mouth 4 times a day. 1 Inhaler 0   • NON SPECIFIED Please provide Lactose free Protein shakes ( Ensure or Boost ) 20 Each 2   • Naproxen Sodium (ALEVE) 220 MG Cap " Take 1 Cap by mouth 1 time daily as needed. 30 Cap 0     No current Epic-ordered facility-administered medications on file.        Past Medical History:   Diagnosis Date   • Dyslipidemia    • Hypertension    • Kidney calculi    • Lactose intolerance    • Lung nodules     ashley Correa, recommend yearly chest xray   • MEDICAL HOME    • OA (osteoarthritis)     right knee, shoulder   • Raynaud's disease /phenomenon    • Torn rotator cuff 2008    left, Dr. Montalvo       Past Surgical History:   Procedure Laterality Date   • APPENDECTOMY  1943   • KNEE ARTHROSCOPY      right   • SHOULDER ARTHROSCOPY      right   • TONSILLECTOMY         Social History   Substance Use Topics   • Smoking status: Former Smoker     Packs/day: 1.00     Years: 30.00     Types: Cigarettes     Quit date: 1/1/1985   • Smokeless tobacco: Never Used      Comment: 1 ppd for 30 years, [id number]   • Alcohol use No       Social History     Social History Narrative   • No narrative on file       Family History   Problem Relation Age of Onset   • Heart Disease Mother    • Heart Disease Father         enlarged heart   • Diabetes Son    • Cancer Maternal Grandmother         suicide    • Cancer Maternal Grandfather    • Cancer Paternal Grandmother         Lung cancer        ROS:     - Constitutional: Negative for fever, chills, unexpected weight change, and fatigue/generalized weakness.     - HEENT positive for runny nose due to allergies.: Negative for headaches, vision changes, hearing changes, ear pain, ear discharge,  sinus congestion, sore throat, and neck pain.      - Respiratory: See HPI.  Negative for dyspnea, wheezing, and crackles.      - Cardiovascular: Negative for chest pain, palpitations, orthopnea, PND, and bilateral lower extremity edema.       Labs: Labs reviewed from May 8, 2019 and questions answered with patient.    Exam: /56 (BP Location: Right arm, Patient Position: Sitting, BP Cuff Size: Adult)   Pulse 68   Temp 36.4 °C  "(97.6 °F) (Temporal)   Ht 1.6 m (5' 3\")   Wt 57.2 kg (126 lb)   SpO2 95%  Body mass index is 22.32 kg/m².    General: Thin, NAD  Pulmonary: Mildly diminished throughout but otherwise clear to auscultation.  No tachypnea or increased work of breathing.  Cardiovascular: Regular rate and rhythm without murmur, rubs or gallop.  No lower extremity edema.  Skin: Warm and dry.  No obvious lesions.  Musculoskeletal:  No extremity cyanosis, clubbing, or edema.  Psych: Normal mood and affect. Alert and oriented. Judgment and insight is normal.    Please note that this dictation was created using voice recognition software. I have made every reasonable attempt to correct obvious errors, but I expect that there are errors of grammar and possibly content that I did not discover before finalizing the note.      Assessment/Plan  Katelyn was seen today for hypertension.    Diagnoses and all orders for this visit:    Screening for colon cancer  -     OCCULT BLOOD FECES IMMUNOASSAY; Future    Essential hypertension  Chronic well-controlled problem for the patient.  In fact, I believe she is running too low for her age.  We will go ahead and stop metoprolol as beta blockers can worsen COPD.  We will continue lisinopril only at this point.  She is planning to check her blood pressure at home and if she begins to run high she will let us know.  She will come back in in 7 to 10 days for blood pressure recheck with medical assistant.  -     lisinopril (PRINIVIL) 5 MG Tab; Take 1 Tab by mouth every day.    Dyspnea, unspecified type  Personal history of smoking  Bronchitis  Patient with recent history of bronchitis which is a new problem for her.  She did show some hyperinflation on her chest x-ray.  She is wondering if she should continue the Combivent.  Would like to go ahead and obtain pulmonary function test to confirm diagnosis of obstructive lung disease which she agrees to.  She can continue the Combivent until then while she is " recovering from her bronchitis.  -     PULMONARY FUNCTION TESTS -Test requested: Complete Pulmonary Function Test; Future    Follow up in 7-10 days for MA visit for BP.     Nery Bowden DO  [facility]

## 2019-05-21 NOTE — TELEPHONE ENCOUNTER
----- Message from Nery Bowden D.O. sent at 5/20/2019  2:23 PM PDT -----  Please call patient let her know that her stool test was negative for blood.  This can be repeated yearly to screen for colon cancer.

## 2019-05-21 NOTE — LETTER
May 22, 2019        Katelyn Hill  [address]  [place] [place] [address]        Dear Katelyn    Here are the results of your test(s):   Fecal occult blood test: negative    Comments:  Repeat a test in 1 year.        Sincerely,        Rehana Campos  Signed Electronically

## 2019-05-23 NOTE — PROGRESS NOTES
Katelyn Hill is a 84 y.o. female here for a non-provider visit for BP Check     Vitals:    05/23/19 1103   BP: 130/80   BP Location: Right arm   Patient Position: Sitting   BP Cuff Size: Adult     If abnormal, was an in office provider notified today? Yes  Routed to PCP? Yes

## 2019-07-02 PROBLEM — R79.89 ELEVATED LFTS: Status: ACTIVE | Noted: 2019-01-01

## 2019-07-02 PROBLEM — K80.50 CHOLEDOCHOLITHIASIS: Status: ACTIVE | Noted: 2019-01-01

## 2019-07-02 NOTE — ED PROVIDER NOTES
ED Provider Note    Scribed for Chris Stauffer M.D. by Shakir Joseph. 7/2/2019, 2:24 PM.    Primary care provider: Monica Rodriguez M.D.  Means of arrival: walk in  History obtained from: patient  History limited by: none    CHIEF COMPLAINT  Chief Complaint   Patient presents with   • Blood in Urine     x 4 days   • Abdominal Pain     patient c/o mid-lower abd pain x 3 days   • Nausea     x 4 days   • Diarrhea     x 4 days       HPI  Katelyn iHll is a 84 y.o. female who presents to the Emergency Department complaining of persistent hematuria for the last 4 days. She describes her urine as brown. Patient reports associated nausea, diarrhea, suprapubic abdominal pain, jaundice. She states the she developed blood in her urine 4 days ago that has not improved. Patient presents today for evaluation of her persistent symptoms. She reports a history of hypertension, arthritis for which she takes Aleve. Patient denies itching, fever, vomiting, alcohol use, tylenol use.      REVIEW OF SYSTEMS  Pertinent positives include hematuria, nausea, diarrhea, abdominal pain, jaundice. Pertinent negatives include no itching, fever, vomiting. As above, all other systems reviewed and are negative.   See HPI for further details.     PAST MEDICAL HISTORY   has a past medical history of Dyslipidemia; Hypertension; Kidney calculi; Lactose intolerance; Lung nodules; MEDICAL HOME; OA (osteoarthritis); Raynaud's disease /phenomenon; and Torn rotator cuff (2008).    SURGICAL HISTORY   has a past surgical history that includes shoulder arthroscopy; appendectomy (1943); tonsillectomy; and knee arthroscopy.    SOCIAL HISTORY  Social History   Substance Use Topics   • Smoking status: Former Smoker     Packs/day: 1.00     Years: 30.00     Types: Cigarettes     Quit date: 1/1/1985   • Smokeless tobacco: Never Used      Comment: 1 ppd for 30 years, [id number]   • Alcohol use No      History   Drug Use No       FAMILY HISTORY  Family  "History   Problem Relation Age of Onset   • Heart Disease Mother    • Heart Disease Father         enlarged heart   • Diabetes Son    • Cancer Maternal Grandmother         suicide    • Cancer Maternal Grandfather    • Cancer Paternal Grandmother         Lung cancer        CURRENT MEDICATIONS  Home Medications     Reviewed by Efrain Plata (Pharmacy Tech) on 07/02/19 at 1507  Med List Status: Complete   Medication Last Dose Status   lisinopril (PRINIVIL) 5 MG Tab 7/2/2019 Active                ALLERGIES  Allergies   Allergen Reactions   • Iodine Anaphylaxis and Swelling   • Aspirin    • Clindamycin    • Penicillins    • Robitussin [Guiatuss] Diarrhea   • Shellfish Allergy    • Tetanus Toxoid    • Zithromax [Azithromycin Dihydrate]        PHYSICAL EXAM  VITAL SIGNS: /87   Pulse 94   Temp 36.7 °C (98.1 °F) (Temporal)   Resp 18   Ht 1.651 m (5' 5\")   Wt 54.9 kg (121 lb 0.5 oz)   LMP 05/01/1985   SpO2 96%   BMI 20.14 kg/m²   Vitals reviewed.  Constitutional: Alert.  HENT: No signs of trauma, Bilateral external ears normal, Nose normal.   Eyes: Pupils are equal and reactive, Conjunctiva normal, Icteric sclera  Neck: Normal range of motion, No tenderness, Supple, No stridor.   Lymphatic: No lymphadenopathy noted.   Cardiovascular: Regular rate and rhythm, no murmurs.   Thorax & Lungs: Normal breath sounds, No respiratory distress, No wheezing, No chest tenderness.   Abdomen: Bowel sounds normal, Soft, Right upper quadrant tenderness, No peritoneal signs, No masses, No pulsatile masses.   Skin: Warm, Dry, No erythema, No rash. Jaundiced.   Back: No bony tenderness, No CVA tenderness.   Extremities: Intact distal pulses, No edema, No tenderness, No cyanosis  Musculoskeletal: Good range of motion in all major joints. No tenderness to palpation or major deformities noted.   Neurologic: Alert , Normal motor function, Normal sensory function, No focal deficits noted.   Psychiatric: Affect normal, Judgment " normal, Mood normal.     DIAGNOSTIC STUDIES / PROCEDURES    LABS  Labs Reviewed   CBC WITH DIFFERENTIAL - Abnormal; Notable for the following:        Result Value    MCHC 31.8 (*)     RDW 53.3 (*)     Neutrophils-Polys 75.90 (*)     Lymphocytes 14.30 (*)     Lymphs (Absolute) 0.75 (*)     All other components within normal limits   COMP METABOLIC PANEL - Abnormal; Notable for the following:     Glucose 110 (*)     Calcium 10.9 (*)     AST(SGOT) 237 (*)     ALT(SGPT) 283 (*)     Alkaline Phosphatase 505 (*)     Total Bilirubin 14.5 (*)     All other components within normal limits   URINALYSIS,CULTURE IF INDICATED - Abnormal; Notable for the following:     Ketones Trace (*)     Bilirubin Large (*)     Nitrite Positive (*)     Leukocyte Esterase Small (*)     Occult Blood Small (*)     All other components within normal limits   URINE MICROSCOPIC (W/UA) - Abnormal; Notable for the following:     RBC 2-5 (*)     Hyaline Cast 3-5 (*)     All other components within normal limits   LIPASE   ESTIMATED GFR   All labs reviewed by me.    RADIOLOGY  US-RUQ   Final Result      1.  Intrahepatic biliary dilatation with mildly dilated CBD. This may be due to CBD stone, stricture or mass.   2.  Gallbladder sludge with borderline thickened gallbladder wall. No gallstones identified. No pericholecystic fluid to suggest cholecystitis.   3.  Simple right renal cyst.      DX-CHEST-PORTABLE (1 VIEW)   Final Result      No acute cardiopulmonary process is seen.      Atherosclerotic plaque.      The radiologist's interpretation of all radiological studies have been reviewed by me.    COURSE & MEDICAL DECISION MAKING  Nursing notes, VS, PMSFHx reviewed in chart.  Differential diagnoses include but not limited to: pancreatic cancer, gall stones     2:24 PM - Patient seen and examined at bedside. Discussed her evaluation in the ED. Patient verbalizes understanding and agreement to this plan of care. Ordered for DX chest, US RUQ, CBC with  differential, CMP, Lipase, Urinalysis to evaluate.      4:04 PM - General surgery paged.    4:36 PM -I spoke with Dr. Jones on-call for general surgery, she would like the patient to be admitted to medicine with GI consult.    4:38 PM -Dr. Molina hospitalist paged, GI paged.    4:49 PM -  I spoke with Dr. Andrews [facility] who would like an MRCP to be performed.    4:55 PM -I spoke with Dr. Molina the hospitalist who will order the MRCP, she will admit the patient in fair condition.      FINAL IMPRESSION  1. Jaundice          CHELSEY, Shakir Joseph (Scribe), am scribing for, and in the presence of, Chris Stauffer M.D..    Electronically signed by: Shakir Joseph (Scribe), 7/2/2019    IChris M.D. personally performed the services described in this documentation, as scribed by Shakir Joseph in my presence, and it is both accurate and complete. C    The note accurately reflects work and decisions made by me.  Chris Stauffer  7/2/2019  5:05 PM

## 2019-07-02 NOTE — ED NOTES
Med Rec completed per patient and medication bottle (returned)   Allergies reviewed  No ORAL antibiotics in last 14 days

## 2019-07-02 NOTE — ED TRIAGE NOTES
"[facility]  .  Chief Complaint   Patient presents with   • Blood in Urine     x 4 days   • Abdominal Pain     patient c/o mid-lower abd pain x 3 days   • Nausea     x 4 days   • Diarrhea     x 4 days     Patient to triage with above complaint. Patient appears jaundice and pale. .Blood Pressure : 126/87, Pulse: 94, Respiration: 18, Temperature: 36.7 °C (98.1 °F), Height: 165.1 cm (5' 5\"), Weight: 54.9 kg (121 lb 0.5 oz), Pulse Oximetry: 96 %    Patient to lobby and instructed to inform staff of any needs.  Charge RN aware of patient.   "

## 2019-07-03 PROBLEM — B18.1 CHRONIC HEPATITIS B (HCC): Status: ACTIVE | Noted: 2019-01-01

## 2019-07-03 PROBLEM — K83.1 OBSTRUCTIVE JAUNDICE: Status: ACTIVE | Noted: 2019-01-01

## 2019-07-03 PROBLEM — E43 SEVERE PROTEIN-CALORIE MALNUTRITION (HCC): Status: ACTIVE | Noted: 2018-09-20

## 2019-07-03 NOTE — PROGRESS NOTES
Received report from NOC RN, patient awake, alert and oriented x 4. 20g IV in SANJIV, flushed and SL. Patient is up standby to the bathroom with cane. Patient denies pain at this time. Patient's skin and sclera are yellow. Call light and belongings within reach.

## 2019-07-03 NOTE — ANESTHESIA POSTPROCEDURE EVALUATION
Patient: Katelyn Hill    Procedure Summary     Date:  07/03/19 Room / Location:  [place] [address] / SURGERY [facility]    Anesthesia Start:  1433 Anesthesia Stop:  1609    Procedure:  ERCP (ENDOSCOPIC RETROGRADE CHOLANGIOPANCREATOGRAPHY) (N/A Mouth) Diagnosis:  (jaundice)    Surgeon:  Melissa Andrews M.D. Responsible Provider:  Pedro Luis Terry M.D.    Anesthesia Type:  general ASA Status:  2          Final Anesthesia Type: general  Last vitals  BP   Blood Pressure : (!) 163/82, NIBP: 158/68    Temp   36.9 °C (98.5 °F)    Pulse   Pulse: 83, Heart Rate (Monitored): 67   Resp   17    SpO2   94 %      Anesthesia Post Evaluation    Patient location during evaluation: PACU  Patient participation: complete - patient participated  Level of consciousness: awake and alert  Pain score: 0    Airway patency: patent  Anesthetic complications: no  Cardiovascular status: hemodynamically stable  Respiratory status: acceptable  Hydration status: euvolemic    PONV: none           Nurse Pain Score: 0 (NPRS)

## 2019-07-03 NOTE — ASSESSMENT & PLAN NOTE
- Not on home medications. Repeat lipid profile -  HDL 51, and LDL of 116, with total cholesterol 174.   - Reasonable to hold off on statin for now given transaminitis.

## 2019-07-03 NOTE — ANESTHESIA TIME REPORT
Anesthesia Start and Stop Event Times     Date Time Event    7/3/2019 1433 Anesthesia Start     1609 Anesthesia Stop        Responsible Staff  07/03/19    Name Role Begin End    Pedro Luis Terry M.D. Anesth 1433 1609        Preop Diagnosis (Free Text):  Pre-op Diagnosis     Jaundice        Preop Diagnosis (Codes):  Diagnosis Information     Diagnosis Code(s):         Post op Diagnosis  Jaundice      Premium Reason  K. Alert    Comments:

## 2019-07-03 NOTE — PROGRESS NOTES
Surgery consulted for biliary duct obstruction, MRCP suggestive of mass in head of pancreas, no gallstones on imagine.  ERCP pending will follow up on results

## 2019-07-03 NOTE — CARE PLAN
Problem: Pain Management  Goal: Pain level will decrease to patient's comfort goal  Outcome: PROGRESSING AS EXPECTED  Patient denies pain at this time.

## 2019-07-03 NOTE — RESPIRATORY CARE
COPD EDUCATION by COPD CLINICAL EDUCATOR  7/3/2019 at 6:45 AM by Angelina Graves     Patient's chart reviewed by COPD education team. Patient does not have an order for Respiratory Care Protocol, therefore the COPD education team cannot treat.

## 2019-07-03 NOTE — CONSULTS
Gastroenterology Consult Note     Date of Consult: 7/2/2019  Referring Physician: Dr Stauffer     Reason for consult: Jaundice        HPI:   Patient is 84-year-old [place] female who presented to the emergency room with complaints of epigastric abdominal pain, dark urine and pale stools over the past 4 days.  She also noticed herself to be jaundiced over the last couple of days.  She had lost appetite also.  She has a history of 45 pound weight loss over the past 10 years.  Denies any recent unintentional weight loss.  In the ER her labs were significant for elevated transaminases and bilirubin of 14.  Ultrasound of the abdomen revealed dilated intrahepatic bile ducts and mildly dilated common bile duct.  No stones were visualized.     PMHX:  Past Medical History:   Diagnosis Date   • Dyslipidemia    • Hypertension    • Kidney calculi    • Lactose intolerance    • Lung nodules     ashley Correa, recommend yearly chest xray   • MEDICAL HOME    • OA (osteoarthritis)     right knee, shoulder   • Raynaud's disease /phenomenon    • Torn rotator cuff 2008    left, Dr. Montalvo          PSurgHx:   Past Surgical History:   Procedure Laterality Date   • APPENDECTOMY  1943   • KNEE ARTHROSCOPY      right   • SHOULDER ARTHROSCOPY      right   • TONSILLECTOMY          ALLERGIES:Iodine; Aspirin; Clindamycin; Penicillins; Robitussin [guiatuss]; Shellfish allergy; Tetanus toxoid; and Zithromax [azithromycin dihydrate]     SocHx:   Social History     Social History   • Marital status:      Spouse name: N/A   • Number of children: 2S   • Years of education: N/A     Occupational History   • Retired -  Retired     Social History Main Topics   • Smoking status: Former Smoker     Packs/day: 1.00     Years: 30.00     Types: Cigarettes     Quit date: 1/1/1985   • Smokeless tobacco: Never Used      Comment: 1 ppd for 30 years, [id number]   • Alcohol use No   • Drug use: No    • Sexual activity: No     Other Topics Concern   • Not on file     Social History Narrative   • No narrative on file        FAMHx:   Family History   Problem Relation Age of Onset   • Heart Disease Mother    • Heart Disease Father         enlarged heart   • Diabetes Son    • Cancer Maternal Grandmother         suicide    • Cancer Maternal Grandfather    • Cancer Paternal Grandmother         Lung cancer         ROS:  Constitutional: No fevers, chills, no night sweats  HEENT: no vision or hearing changes, no dry mouth, no change in smell  CARDIO: no palpitations, no orthopnea, no chest pain  PULM: no cough, no shortness of breath  NEURO: no Seizures, no memory impairment, no change in sensation  GI: as above  : no dysuria, positive for dark urine  HEME: no anemia, no easy brusing  MUSCULOSKELETAL: no muscle aches, no back pain, no arthritis  PSYCH: no anxiety or depression  SKIN: no rashes     PE:  Vitals:    07/02/19 1530 07/02/19 1600 07/02/19 1630 07/02/19 1800   BP:       Pulse:   66 66   Resp: 19 18 16 16   Temp:       TempSrc:       SpO2:   96% 88%   Weight:       Height:         Gen: AAOx3, NAD, lying in bed  HEENT: PERRL, EOMI, conjunctiva icteric  Neck: supple, no cervical or supraclavicular adenopathy  CVS: regular rhythm, normal rate, no MRG  Pulm: CTAB, no crackles  Abd: soft, Nd, NT, no guarding or rebound  Ext: no edema, normal sensation  NEURO: grossly normal, no weakness  Skin: Jaundiced  Psych: normal Affect, no anxiety     LABS:  Lab Results   Component Value Date/Time    SODIUM 135 07/02/2019 01:40 PM    POTASSIUM 3.9 07/02/2019 01:40 PM    CHLORIDE 100 07/02/2019 01:40 PM    CO2 29 07/02/2019 01:40 PM    GLUCOSE 110 (H) 07/02/2019 01:40 PM    BUN 20 07/02/2019 01:40 PM    CREATININE 0.64 07/02/2019 01:40 PM    CREATININE 0.71 01/19/2011 10:30 AM    BUNCREATRAT 27 (H) 01/19/2011 10:30 AM    GLOMRATE >59 01/19/2011 10:30 AM      Lab Results   Component Value Date/Time    WBC 5.2 07/02/2019 01:40  PM    RBC 4.56 07/02/2019 01:40 PM    HEMOGLOBIN 13.9 07/02/2019 01:40 PM    HEMATOCRIT 43.7 07/02/2019 01:40 PM    MCV 95.8 07/02/2019 01:40 PM    MCH 30.5 07/02/2019 01:40 PM    MCHC 31.8 (L) 07/02/2019 01:40 PM    MPV 10.2 07/02/2019 01:40 PM    NEUTSPOLYS 75.90 (H) 07/02/2019 01:40 PM    LYMPHOCYTES 14.30 (L) 07/02/2019 01:40 PM    MONOCYTES 8.20 07/02/2019 01:40 PM    EOSINOPHILS 0.60 07/02/2019 01:40 PM    BASOPHILS 0.60 07/02/2019 01:40 PM        Lab Results   Component Value Date/Time    PROTHROMBTM 13.6 04/28/2015 03:32 PM    INR 1.05 04/28/2015 03:32 PM      Recent Labs      07/02/19   1340   ASTSGOT  237*   ALTSGPT  283*   TBILIRUBIN  14.5*   GLOBULIN  3.1          Problem List Items Addressed This Visit     None      Visit Diagnoses     Jaundice               ASSESSMENT: 84-year-old female presenting with upper abdominal pain and jaundice.  Her imaging findings concerning for proximal bile duct or hepatic duct obstruction.  Although abdominal pain is suggestive of choledocholithiasis, benign or malignant biliary stricture also should be considered in the differential diagnosis.  For further evaluation we will obtain MRI with MRCP.  She will require ERCP for further evaluation and therapeutic intervention.      PLAN:   1) MRI abdomen  2) ERCP tomorrow      Thank you for this consult.

## 2019-07-03 NOTE — PROGRESS NOTES
2 RN skin check completed with TINA Villalta. Patient skin is jaundice in appearance. There are some dry patches of skin, per pt she thinks that she has cirrhosis. No open wounds.  No other skin issues noted.

## 2019-07-03 NOTE — ANESTHESIA PROCEDURE NOTES
Airway  Date/Time: 7/3/2019 2:37 PM  Performed by: EVE SCHMITT  Authorized by: EVE SCHMITT     Location:  OR  Urgency:  Elective  Indications for Airway Management:  Anesthesia  Spontaneous Ventilation: absent    Sedation Level:  Deep  Preoxygenated: Yes    Patient Position:  Sniffing  Final Airway Type:  Endotracheal airway  Final Endotracheal Airway:  ETT  Cuffed: Yes    Technique Used for Successful ETT Placement:  Direct laryngoscopy  Insertion Site:  Oral  Blade Type:  Linda  Laryngoscope Blade/Videolaryngoscope Blade Size:  3  ETT Size (mm):  6.5  Measured from:  Teeth  Placement Verified by: auscultation and capnometry    Number of Attempts at Approach:  1

## 2019-07-03 NOTE — CARE PLAN
Problem: Safety  Goal: Will remain free from falls  Outcome: PROGRESSING AS EXPECTED  Bed is locked and in lowest position, call light and bedside table are within reach, treaded slipper socks are on, and hourly rounding is in place.      Problem: Skin Integrity  Goal: Risk for impaired skin integrity will decrease  Outcome: PROGRESSING AS EXPECTED  Pt demonstrated to RN how she moves around frequently in bed, and states that will make sure to not stay in one position too long.

## 2019-07-03 NOTE — ANESTHESIA PREPROCEDURE EVALUATION
Relevant Problems   (+) Chronic hepatitis B (HCC)   (+) HTN (hypertension)       Physical Exam    Airway   Mallampati: II  TM distance: >3 FB  Neck ROM: full       Cardiovascular - normal exam  Rhythm: regular  Rate: normal  (-) murmur     Dental - normal exam      Very poor dentition   Pulmonary - normal exam  Breath sounds clear to auscultation     Abdominal    Neurological - normal exam               Anesthesia Plan    ASA 2       Plan - general       Airway plan will be ETT                  Informed Consent:

## 2019-07-03 NOTE — PROGRESS NOTES
Immediate postoperative note :    Procedure performed:ERCP    Endoscopist:Dr. Juliana LARSEN    Anesthesia:GETA    Complications: None    Estimated blood loss:less than 5 cc    Preoperative diagnosis:Obstructive jaundice    Post operative diagnosis:Pancreatic divisum    Findings:  1.Normal major ampulla with some biliary drainage  2. Unable to achieve cannulation of bile duct  3. Ventral duct only filled from the major ampulla, consistent with pancreas divisum. Prominent minor ampulla.      Recommendations:  1. Follow clinical course. Will retry ercp by another endoscopis, if unsuccessful, refer for percutaneous intervention by IR.

## 2019-07-03 NOTE — PROGRESS NOTES
Hospital Medicine Daily Progress Note    Date of Service  7/3/2019    Chief Complaint  Abdominal pain, jaundice    Hospital Course    84 y.o. female With hyperlipidemia, hypertension, admitted 7/2/2019 with right upper quadrant and epigastric abdominal pain with progressive jaundice over the last 4 days, along with dark urine and pale bowel movements.  Initial work-up showed no leukocytosis, with significant transaminitis with ALT of 283, AST of 237, total bilirubin of 14.5, and alkaline phosphatase of 505.  Lipase was normal.  Right upper quadrant ultrasound showed intrahepatic biliary dilation with mildly dilated CBD, with gallbladder sludge with borderline thickened gallbladder wall, with no pericholecystic fluid to suggest cholecystitis.  MRCP was ordered, and GI was consulted         Interval Problem Update  7/3/2019 - I reviewed the patient's chart today. Uneventful night. VSS. Afebrile. Saturating well on RA.  LFTs have trended down, with AST of 188, ALT of 227, and alkaline phosphatase of 390.  Total bilirubin remains elevated at 14.  Viral hepatitis panel showed reactive hepatitis B surface antigen, with negative hepatitis B core antibody.  Pending MRCP. Plan for ERCP today.    > I have personally seen and examined the patient today. She continues to have pain on the right upper quadrant, rated 10 out of 10.  She felt nauseated this morning, but did not vomit.  Denies any fevers or chills, chest pain, or shortness of breath.      Consultants/Specialty  GI    Code Status  Full    Disposition  Monitor on the floors    Review of Systems  ROS     Pertinent positives/negatives as mentioned above.     A complete review of systems was personally done by me. All other systems were negative.       Physical Exam  Temp:  [36.7 °C (98.1 °F)-37.2 °C (99 °F)] 37.2 °C (99 °F)  Pulse:  [66-94] 85  Resp:  [16-20] 16  BP: (121-142)/(60-87) 142/80  SpO2:  [88 %-96 %] 94 %    Physical Exam   Constitutional: She is oriented to  person, place, and time. She appears well-developed. No distress.   Cachectic. Body mass index is 20.14 kg/m².   HENT:   Head: Normocephalic and atraumatic.   Mouth/Throat: No oropharyngeal exudate.   Eyes: Pupils are equal, round, and reactive to light. Conjunctivae are normal. Scleral icterus is present.   Neck: Normal range of motion. Neck supple.   Cardiovascular: Normal rate and regular rhythm.  Exam reveals no gallop and no friction rub.    No murmur heard.  Pulmonary/Chest: Effort normal and breath sounds normal. No respiratory distress. She has no wheezes. She has no rales. She exhibits no tenderness.   Abdominal: Soft. Bowel sounds are normal. She exhibits no distension. There is tenderness ( (+) RUQ tenderness.  No guarding or rebound.  No peritoneal signs). There is no rebound and no guarding.   Musculoskeletal: Normal range of motion. She exhibits no edema or tenderness.   Lymphadenopathy:     She has no cervical adenopathy.   Neurological: She is alert and oriented to person, place, and time. No cranial nerve deficit.   Skin: Skin is warm and dry. No rash noted. She is not diaphoretic. No erythema. No pallor.   Significantly jaundiced skin   Psychiatric: She has a normal mood and affect. Her behavior is normal. Judgment and thought content normal.   Nursing note and vitals reviewed.         Fluids    Intake/Output Summary (Last 24 hours) at 07/03/19 0842  Last data filed at 07/02/19 2015   Gross per 24 hour   Intake              120 ml   Output                0 ml   Net              120 ml       Laboratory  Recent Labs      07/02/19   1340  07/03/19   0232   WBC  5.2  5.7   RBC  4.56  3.99*   HEMOGLOBIN  13.9  12.7   HEMATOCRIT  43.7  37.1   MCV  95.8  93.0   MCH  30.5  31.8   MCHC  31.8*  34.2   RDW  53.3*  52.2*   PLATELETCT  221  189   MPV  10.2  10.2     Recent Labs      07/02/19   1340  07/03/19   0232   SODIUM  135  141   POTASSIUM  3.9  4.0   CHLORIDE  100  103   CO2  29  28   GLUCOSE  110*  92    BUN  20  18   CREATININE  0.64  0.62   CALCIUM  10.9*  9.7                   Imaging  US-RUQ   Final Result      1.  Intrahepatic biliary dilatation with mildly dilated CBD. This may be due to CBD stone, stricture or mass.   2.  Gallbladder sludge with borderline thickened gallbladder wall. No gallstones identified. No pericholecystic fluid to suggest cholecystitis.   3.  Simple right renal cyst.      DX-CHEST-PORTABLE (1 VIEW)   Final Result      No acute cardiopulmonary process is seen.      Atherosclerotic plaque.      [id number]    (Results Pending)        Assessment/Plan  * Obstructive jaundice- (present on admission)   Assessment & Plan    - with right upper quadrant pain and jaundice.  Ultrasound showed mildly dilated common bile duct and intrahepatic biliary dilation.  Stone versus stricture from malignancy?  No evidence of acute cholecystitis on ultrasound. No signs of cholangitis.   -Await MRCP.  GI consulted, who will also do an ERCP to evaluate for biliary obstruction.  -Continue to trend liver function test after intervention.  -Continue symptom control, start IV morphine every 3 hours as needed, and continue antiemetics.  -Continue to hold off on antibiotics as no signs of infection.      Chronic hepatitis B (HCC)- (present on admission)   Assessment & Plan    -Needs continued outpatient follow-up with GI.     Elevated LFTs- (present on admission)   Assessment & Plan    - Secondary to biliary obstruction of unknown etiology. Further work-up as above.     Severe protein-calorie malnutrition (HCC)- (present on admission)   Assessment & Plan    - RD consult.      HTN (hypertension)- (present on admission)   Assessment & Plan    -Continue home dose lisinopril.  Monitor blood pressure trend closely, and adjust antihypertensives if blood pressure remains elevated.     Dyslipidemia- (present on admission)   Assessment & Plan    - Not on home medications. Last lipid panel was in 2015.  -Check lipid  profile in the morning.  Reasonable to hold off on statin for now given transaminitis.          VTE prophylaxis: SCD

## 2019-07-03 NOTE — ASSESSMENT & PLAN NOTE
- with right upper quadrant pain and jaundice.  Ultrasound showed mildly dilated common bile duct and intrahepatic biliary dilation.  MRCP showed intrahepatic and extrahepatic biliary dilation with abrupt narrowing of the common bile duct at the pancreatic head with findings concerning for pancreatic head mass, with distended gallbladder, with no stone demonstrated in the gallbladder or biliary tree. No evidence of acute cholecystitis on ultrasound. No signs of cholangitis. 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  No discrete pancreatic mass seen on MRI.  -failed attempts at ERCP x2. Now has percutaneous biliary drainage.   -GI planning for EUS during this hospitalization.  Awaiting arrangements to transfer to [facility] for EUS.   -Will need outpatient referral/follow-up with hepatobiliary surgery (Dr. Pat).  -continue to trend LFTs.  -Continue symptom control with IV morphine PRN, and PRN oxycodone. Continue antiemetics.  -Continue to hold off on antibiotics as no signs of infection. Follow biliary culture.

## 2019-07-03 NOTE — H&P
Hospital Medicine History & Physical Note    Date of Service  7/2/2019    Primary Care Physician  Monica Rodriguez M.D.    Consultants  GI     Code Status  Full code    Chief Complaint  Jaundice and abdominal pain    History of Presenting Illness  84 y.o. female who presented 7/2/2019 with right upper quadrant and epigastric abdominal pain with progressive jaundice over the last 4 days.  She first noted dark urine and then clean, pale BMs.  She was concerned that she had hepatitis so she came in for further evaluation.  This was associated with nausea and chills but no fever or vomiting.    Review of Systems  Review of Systems   Constitutional: Positive for malaise/fatigue. Negative for chills and fever.        No appetite   HENT: Negative for congestion and sore throat.    Respiratory: Negative for cough and shortness of breath.    Cardiovascular: Negative for chest pain, palpitations and leg swelling.   Gastrointestinal: Positive for abdominal pain. Negative for blood in stool, nausea and vomiting.        Pale, lasha colored stools   Genitourinary: Negative for dysuria.        Dark urine   Musculoskeletal: Negative for back pain and falls.   Neurological: Positive for weakness. Negative for dizziness, focal weakness, loss of consciousness and headaches.   Psychiatric/Behavioral: The patient is nervous/anxious.    All other systems reviewed and are negative.      Past Medical History   has a past medical history of Dyslipidemia; Hypertension; Kidney calculi; Lactose intolerance; Lung nodules; MEDICAL HOME; OA (osteoarthritis); Raynaud's disease /phenomenon; and Torn rotator cuff (2008). She also has no past medical history of Breast cancer (HCC) or Hypercholesteremia.    Surgical History   has a past surgical history that includes shoulder arthroscopy; appendectomy (1943); tonsillectomy; and knee arthroscopy.     Family History  family history includes Cancer in her maternal grandfather, maternal grandmother, and  paternal grandmother; Diabetes in her son; Heart Disease in her father and mother.   Family history of coronary artery disease and rheumatoid arthritis.  Father with cancer (unknown type).    Social History   reports that she quit smoking about 34 years ago. Her smoking use included Cigarettes. She has a 30.00 pack-year smoking history. She has never used smokeless tobacco. She reports that she does not drink alcohol or use drugs.  History of tobacco abuse, quit when she was roughly 50 years old.  Denies alcohol or illicit drug use.    Allergies  Allergies   Allergen Reactions   • Iodine Anaphylaxis and Swelling   • Aspirin    • Clindamycin    • Penicillins    • Robitussin [Guiatuss] Diarrhea   • Shellfish Allergy    • Tetanus Toxoid    • Zithromax [Azithromycin Dihydrate]        Medications  Prior to Admission Medications   Prescriptions Last Dose Informant Patient Reported? Taking?   lisinopril (PRINIVIL) 5 MG Tab 7/2/2019 at 0830 Rx Bottle (For Med Information) No No   Sig: Take 1 Tab by mouth every day.      Facility-Administered Medications: None       Physical Exam  Temp:  [36.7 °C (98.1 °F)] 36.7 °C (98.1 °F)  Pulse:  [66-94] 66  Resp:  [16-20] 16  BP: (126)/(87) 126/87  SpO2:  [96 %] 96 %    Physical Exam   Constitutional: She is oriented to person, place, and time. She appears well-developed. No distress.   Jaundiced, thin   HENT:   Head: Normocephalic.   Mouth/Throat: Oropharynx is clear and moist.   Eyes: Pupils are equal, round, and reactive to light. EOM are normal. Right eye exhibits no discharge. Left eye exhibits no discharge. Scleral icterus is present.   Neck: Normal range of motion. Neck supple. No tracheal deviation present.   Cardiovascular: Normal rate, regular rhythm and intact distal pulses.    Pulmonary/Chest: Effort normal and breath sounds normal. No stridor. No respiratory distress. She has no rales.   Abdominal: Soft. Bowel sounds are normal. She exhibits no distension and no mass. There  is tenderness (Right upper quadrant). There is no guarding.   Musculoskeletal: She exhibits no edema.   Neurological: She is alert and oriented to person, place, and time.   Skin: Skin is warm and dry. She is not diaphoretic.   Psychiatric: Her speech is normal and behavior is normal. Her mood appears anxious. Cognition and memory are normal.   Vitals reviewed.      Laboratory:  Recent Labs      07/02/19   1340   WBC  5.2   RBC  4.56   HEMOGLOBIN  13.9   HEMATOCRIT  43.7   MCV  95.8   MCH  30.5   MCHC  31.8*   RDW  53.3*   PLATELETCT  221   MPV  10.2     Recent Labs      07/02/19   1340   SODIUM  135   POTASSIUM  3.9   CHLORIDE  100   CO2  29   GLUCOSE  110*   BUN  20   CREATININE  0.64   CALCIUM  10.9*     Recent Labs      07/02/19   1340   ALTSGPT  283*   ASTSGOT  237*   ALKPHOSPHAT  505*   TBILIRUBIN  14.5*   LIPASE  20   GLUCOSE  110*                 No results for input(s): TROPONINI in the last 72 hours.    Urinalysis:    Recent Labs      07/02/19   1428   SPECGRAVITY  1.021   GLUCOSEUR  Negative   KETONES  Trace*   NITRITE  Positive*   LEUKESTERAS  Small*   WBCURINE  0-2   RBCURINE  2-5*   BACTERIA  Negative   EPITHELCELL  Negative        Imaging:  US-RUQ   Final Result      1.  Intrahepatic biliary dilatation with mildly dilated CBD. This may be due to CBD stone, stricture or mass.   2.  Gallbladder sludge with borderline thickened gallbladder wall. No gallstones identified. No pericholecystic fluid to suggest cholecystitis.   3.  Simple right renal cyst.      DX-CHEST-PORTABLE (1 VIEW)   Final Result      No acute cardiopulmonary process is seen.      Atherosclerotic plaque.      [id number]/O    (Results Pending)         Assessment/Plan:  I anticipate this patient will require at least two midnights for appropriate medical management, necessitating inpatient admission.    * Choledocholithiasis- (present on admission)   Assessment & Plan    Patient with right upper quadrant pain and jaundice.  Ultrasound  showed mildly dilated common bile duct and intrahepatic biliary dilation.  Stone versus stricture from malignancy?  No evidence of acute cholecystitis on ultrasound, vital signs stable and no leukocytosis.  Holding off on antibiotics at this time  -MRCP pending  -GI consulted, plan for ERCP in the morning, n.p.o. at midnight  -Pain control  -Repeat LFTs tomorrow     Elevated LFTs- (present on admission)   Assessment & Plan    Secondary to biliary obstruction of unknown etiology  -Repeat LFTs pending  -Hepatitis panel pending     Cachexia (HCC)- (present on admission)   Assessment & Plan    With severe protein calorie malnutrition  -Dietary consult     HTN (hypertension)- (present on admission)   Assessment & Plan    Has been borderline hypertensive  -Resume home lisinopril     Dyslipidemia- (present on admission)   Assessment & Plan    Per chart review.  Last lipid panel in 2015, currently on any medication         VTE prophylaxis:SCDs

## 2019-07-03 NOTE — PROGRESS NOTES
Patient refused SCDs, educated regarding importance.  Education reinforced by TINA Mallory.  Patient continues to refuse.

## 2019-07-03 NOTE — PROGRESS NOTES
Pt arrived to the floor at change of shift. Admit profile and assessment completed.  A&Ox4 and very pleasant.  Juandice colored skin. Pt reports pain to right hand and leg, that pt says is her arthritis pain.  Declines any intervention for pain.  Pt now resting in bed with all needs met.     Bed is locked and in lowest position, call light and bedside table are within reach, treaded slipper socks are on, and hourly rounding is in place.

## 2019-07-03 NOTE — CARE PLAN
Problem: Knowledge Deficit  Goal: Knowledge of disease process/condition, treatment plan, diagnostic tests, and medications will improve  Outcome: PROGRESSING AS EXPECTED  PAtient is NPO for procedure. Patient asked several time si f she could eat or drink. Education provided, water and snacks removed from room.

## 2019-07-03 NOTE — ASSESSMENT & PLAN NOTE
-Continue home dose lisinopril.  Monitor blood pressure trend closely, and adjust antihypertensives if blood pressure remains elevated.

## 2019-07-03 NOTE — PROGRESS NOTES
Patient NPO since 0000 7/3. Patient has 2 sons at bedside, pre-op checklist completed, SCD sleeves with patient, jewelery removed and sent with family.

## 2019-07-04 NOTE — DIETARY
"Nutrition services: Day 2 of admit.  Katelyn Hill is a 84 y.o. female with admitting DX of acute cholangitis.  Consult received for poor oral intake and supplements.     Assessment:  Height: 165.1 cm (5' 5\")  Weight: 54.9 kg (121 lb 0.5 oz)  Body mass index is 20.14 kg/m²., BMI classification: WNL  Diet/Intake: NPO x 2 days    Evaluation:   1. Pt has been NPO past 2 days for tests/procedures - will follow up to complete consult upon diet advancement  2. Recent weight hx obtained from chart review:   · 2/6/19 = 50 kg (110#) per bed scale  · 5/8/19 = 57 kg (126#) per stand up scale  · 7/2/19 = 54.9 kg (121#) per stand up scale  · Weight trend variable over past 5 months  · 2.1 kg (4%) weight loss over past 2 months - not significant  3. Labs: LFTs and bilirubin elevated    Malnutrition Risk: PO intake hx/nutrition focused physical exam not obtained at this time - will reassess upon follow up.    Recommendations/Plan:  1. Diet advance past NPO/clears liquids as medically feasible  2. RD to follow up once diet > clear liquids to obtain recent diet hx   3. Encourage intake of meals  4. Document intake of all meals as % taken in ADL's to provide interdisciplinary communication across all shifts.   5. Monitor weight.  6. Nutrition rep will continue to see patient for ongoing meal and snack preferences.   7. RD to monitor for diet advancement, PO intake, wt trends, and nutrition labs/meds    RD to follow               "

## 2019-07-04 NOTE — CARE PLAN
Problem: Communication  Goal: The ability to communicate needs accurately and effectively will improve  Outcome: PROGRESSING AS EXPECTED  Pt encouraged to voice needs and concerns, pt verbalized understanding.     Problem: Pain Management  Goal: Pain level will decrease to patient's comfort goal  Outcome: PROGRESSING AS EXPECTED  Pain assessed Q2h and prn. Pt encouraged to voice pain to RN.

## 2019-07-04 NOTE — PROGRESS NOTES
Gastroenterology Progress Note     Author: Melissa Andrews   Date & Time Created: 7/4/2019 2:45 PM    Chief Complaint:  Obstructive jaundice    Interval History:  Underwent ERCP yesterday, biliary cannulation could not be achieved.  Patient doing well post procedure.  Denies any abdominal pain.    Review of Systems:  Review of Systems   Constitutional: Negative for chills and fever.   Cardiovascular: Negative for chest pain.   Gastrointestinal: Negative for abdominal pain, nausea and vomiting.       Physical Exam:  Physical Exam   Constitutional: She is oriented to person, place, and time. No distress.   Eyes: Scleral icterus is present.   Cardiovascular: Regular rhythm.    No murmur heard.  Pulmonary/Chest: Breath sounds normal. No respiratory distress.   Abdominal: Soft. Bowel sounds are normal. There is no tenderness.   Neurological: She is alert and oriented to person, place, and time.   Skin:   jaundiced       Labs:          Recent Labs      07/02/19   1340  07/03/19   0232  07/04/19   0203   SODIUM  135  141  136   POTASSIUM  3.9  4.0  3.6   CHLORIDE  100  103  100   CO2  29  28  26   BUN  20  18  16   CREATININE  0.64  0.62  0.68   CALCIUM  10.9*  9.7  9.7     Recent Labs      07/02/19   1340  07/03/19   0232  07/04/19   0203   ALTSGPT  283*  227*  201*   ASTSGOT  237*  188*  153*   ALKPHOSPHAT  505*  390*  384*   TBILIRUBIN  14.5*  14.0*  14.7*   LIPASE  20   --    --    GLUCOSE  110*  92  92     Recent Labs      07/02/19   1340  07/03/19   0232   RBC  4.56  3.99*   HEMOGLOBIN  13.9  12.7   HEMATOCRIT  43.7  37.1   PLATELETCT  221  189     Recent Labs      07/02/19   1340  07/03/19   0232  07/04/19   0203   WBC  5.2  5.7   --    NEUTSPOLYS  75.90*   --    --    LYMPHOCYTES  14.30*   --    --    MONOCYTES  8.20   --    --    EOSINOPHILS  0.60   --    --    BASOPHILS  0.60   --    --    ASTSGOT  237*  188*  153*   ALTSGPT  283*  227*  201*   ALKPHOSPHAT  505*  390*  384*   TBILIRUBIN  14.5*  14.0*  14.7*      Hemodynamics:  Temp (24hrs), Av.9 °C (98.4 °F), Min:36.4 °C (97.6 °F), Max:37.6 °C (99.7 °F)  Temperature: 37.6 °C (99.7 °F)  Pulse  Av.6  Min: 66  Max: 94Heart Rate (Monitored): (!) 126  Blood Pressure : 138/74, NIBP: (!) 164/73     Respiratory:    Respiration: 16, Pulse Oximetry: 98 %           Fluids:    Intake/Output Summary (Last 24 hours) at 19 1445  Last data filed at 19 1609   Gross per 24 hour   Intake             1100 ml   Output                5 ml   Net             1095 ml        GI/Nutrition:  Orders Placed This Encounter   Procedures   • Diet Order Clear Liquid     Standing Status:   Standing     Number of Occurrences:   1     Order Specific Question:   Diet:     Answer:   Clear Liquid [10]   • Diet NPO     Standing Status:   Standing     Number of Occurrences:   1     Order Specific Question:   Restrict to:     Answer:   Sips with Medications [3]     Medical Decision Making, by Problem:  Active Hospital Problems    Diagnosis   • *Obstructive jaundice [K83.8]   • Chronic hepatitis B (HCC) [B18.1]   • Elevated LFTs [R94.5]   • Severe protein-calorie malnutrition (HCC) [E43]   • HTN (hypertension) [I10]   • Dyslipidemia [E78.5]   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  Will retry ERCP tomorrow.  Check CA 19-9 level.  She will eventually need endoscopy ultrasound for histological confirmation and staging.  She has evidence of chronic hepatitis B, viral DNA pending.  We will start treatment if DNA positive.    Plan:  Advance diet  Check CA 19-9 level  ERCP tomorrow    Quality-Core Measures

## 2019-07-04 NOTE — CARE PLAN
Problem: Nutritional:  Goal: Achieve adequate nutritional intake  Diet will advance beyond NPO/clear liquids and patient will consume ~50% of meals  Outcome: NOT MET

## 2019-07-04 NOTE — PROGRESS NOTES
A&Ox4, reporting discomfort to throat from being intubated earlier for procedure.  Pt appears jaundice and fatigued.  Chicken broth provided. All needs met at this time.    Bed is locked and in lowest position, call light and bedside table are within reach, treaded slipper socks are on, and hourly rounding is in place.

## 2019-07-04 NOTE — PROCEDURES
DATE OF SERVICE:  07/03/2019    ATTENDING PHYSICIAN:  Gregg Bravo MD    PROCEDURE PERFORMED:  Endoscopic retrograde cholangiopancreatography.    ENDOSCOPIST:  Melissa Andrews MD    ANESTHESIOLOGIST:  Pedro Luis Terry MD    ANESTHESIA:  General endotracheal tube anesthesia.    COMPLICATIONS:  None.    ESTIMATED BLOOD LOSS:  Less than 5 mL    PREOPERATIVE DIAGNOSIS:  Obstructive jaundice secondary to pancreatic head   mass.    POSTOPERATIVE DIAGNOSES:  1.  Pancreatic divisum.  2.  Failed cannulation of the bile duct.    FINDINGS:  1.  Normal major ampulla noted with some biliary drainage.  2.  The ventral duct of the pancreas filled by a single injection from the   major ampulla.  This finding is consistent with pancreatic divisum.  3.  Prominent minor ampulla noted as expected in pancreatic divisum.  4.  Unable to achieve cannulation of bile duct.    INDICATIONS FOR PROCEDURE:  The patient is an 84-year-old female who presented   with history of progressive jaundice and abdominal pain.  An MRI abdomen and   MRCP revealed evidence of distal biliary obstruction due to a pancreatic head   mass.    DESCRIPTION OF PROCEDURE:  After discussion of indications, risks, benefits   including the risk of pancreatitis, perforation, bleeding and infection any of   which can lead to death in rare cases, an informed consent was signed by the   patient.  The patient was examined immediately prior to the procedure and was   noted to be medically fit to undergo the examination.  The patient received   400 mg of ciprofloxacin immediately prior to procedure due to obstructed bile   duct.    The patient was brought to the operative suite where after appropriate   time-out was performed, she was placed under general endotracheal tube   anesthesia.  Please see anesthesia record for other pertinent details.  The   patient was placed in the prone position and the Olympus video side-viewing   duodenoscope was placed over the tongue  of esophagus was intubated under   direct visualization.  Scope was then advanced through the lumen of the   esophagus along the greater curvature of stomach through to the second portion   of the duodenum without detailed examination of the upper GI tract.  The   major ampulla was noted to be long and bulging and the scope could be placed   in an en fast view only in the long position.  Because of the orientation of   the major ampulla, the bile duct could not be cannulated using wire guided   technique in spite of multiple changes in position including placing the   patient on the left lateral position.  The ventral pancreatic duct was briefly   cannulated and visualized after single injection of contrast.  This appeared   slightly dilated; otherwise, normal.  The dorsal duct did not opacify, which   is consistent with a complete pancreatic divisum.  After manipulation of the   area of biliary sphincter, there was some drainage of dark bile, but again   this did not allow deep cannulation of the bile duct.  Complete drainage of   dye from the pancreatic duct was fluoroscopically confirmed.  After deflating   the small bowel and stomach, the scope was pulled out and the procedure was   terminated.  The patient received 100 mg of indomethacin rectally immediately   after procedure to minimize risk of pancreatitis.  The patient tolerated the   procedure well without any immediate post-procedure complications.    PLAN:  1.  We will continue to monitor her clinical course.  2.  We will retry ERCP by a different provider on Friday.  If this is   unsuccessful, she will require percutaneous intervention.  3.  Patient will require endoscopic ultrasound for confirmation of tissue   diagnosis and vascular involvement based on which surgical intervention could   be considered.       ____________________________________     Sumodh MD RUPERT Chaudhary / HELADIO    DD:  07/03/2019 16:53:19  DT:  07/03/2019 17:43:09    D#:   [id number]  Job#:  [id number]

## 2019-07-04 NOTE — PROGRESS NOTES
Assumed care of pt this am. Pt is A&O x4. Pt denies pain at this time. Ambulates independently gait is steady. Pt showered this am. Educated on NPO for MRI. Received verbal order from Dr. Bravo to put pt on clear liquid diet after MRI. Pt encouraged to ambulate and call for help when needed. Pt verbalized understanding. Hourly rounding in place.

## 2019-07-04 NOTE — PROGRESS NOTES
Hospital Medicine Daily Progress Note    Date of Service  7/4/2019    Chief Complaint  Abdominal pain, jaundice    Hospital Course    84 y.o. female With hyperlipidemia, hypertension, admitted 7/2/2019 with right upper quadrant and epigastric abdominal pain with progressive jaundice over the last 4 days, along with dark urine and pale bowel movements.  Initial work-up showed no leukocytosis, with significant transaminitis with ALT of 283, AST of 237, total bilirubin of 14.5, and alkaline phosphatase of 505.  Lipase was normal.  Right upper quadrant ultrasound showed intrahepatic biliary dilation with mildly dilated CBD, with gallbladder sludge with borderline thickened gallbladder wall, with no pericholecystic fluid to suggest cholecystitis.  Viral hepatitis panel showed reactive hepatitis B surface antigen, with negative hepatitis B core antibody.   MRCP was ordered, and GI was consulted.         Interval Problem Update  7/4/2019 - I reviewed the patient's chart. There were no significant overnight events. Remains hemodynamically stable and afebrile. Stable on RA.  Transaminases trended down, with AST of 153, ALT of 201.  Alkaline phosphatase remains elevated at 384.  Total bilirubin remains high at 14.7.  HDL 51, and LDL of 116, with total cholesterol 174.  MRCP showed intrahepatic and extrahepatic biliary dilation with abrupt narrowing of the common bile duct at the pancreatic head with findings concerning for pancreatic head mass, with distended gallbladder, with no stone demonstrated in the gallbladder or biliary tree.  She had an unsuccessful ERCP yesterday, plan for repeat one by a different endoscopist.  Surgery was consulted.    > I have personally seen and examined the patient today. She continues to have right upper quadrant pain, but well controlled with medications.  She denies any nausea or vomiting.  No chest pain or shortness of breath.  She is very pleasant, coherent, answers questions coherently.   In good spirits.        Consultants/Specialty  GI    Code Status  Full    Disposition  Monitor on the floors    Review of Systems  ROS     Pertinent positives/negatives as mentioned above.     A complete review of systems was personally done by me. All other systems were negative.       Physical Exam  Temp:  [36.4 °C (97.6 °F)-37.6 °C (99.7 °F)] 37.6 °C (99.7 °F)  Pulse:  [69-90] 72  Resp:  [14-18] 16  BP: (133-176)/(55-88) 138/74  SpO2:  [92 %-100 %] 98 %    Physical Exam   Constitutional: She is oriented to person, place, and time. She appears well-developed. No distress.   Cachectic. Body mass index is 20.14 kg/m².   HENT:   Head: Normocephalic and atraumatic.   Mouth/Throat: No oropharyngeal exudate.   Eyes: Pupils are equal, round, and reactive to light. Conjunctivae are normal.   Neck: Normal range of motion. Neck supple.   Cardiovascular: Normal rate and regular rhythm.  Exam reveals no gallop and no friction rub.    No murmur heard.  Pulmonary/Chest: Effort normal and breath sounds normal. No respiratory distress. She has no wheezes. She has no rales. She exhibits no tenderness.   Abdominal: Soft. Bowel sounds are normal. She exhibits no distension. There is tenderness ( (+) RUQ tenderness.  No guarding or rebound.  No peritoneal signs). There is no rebound and no guarding.   Musculoskeletal: Normal range of motion. She exhibits no edema or tenderness.   Lymphadenopathy:     She has no cervical adenopathy.   Neurological: She is alert and oriented to person, place, and time. No cranial nerve deficit.   Skin: Skin is warm and dry. No rash noted. She is not diaphoretic. No erythema. No pallor.   Significantly jaundiced skin   Psychiatric: She has a normal mood and affect. Her behavior is normal. Judgment and thought content normal.   Nursing note and vitals reviewed.     I have performed the physical examination today 7/4/2019.  In review of yesterday's note, there are no new changes except as documented  above.        Fluids    Intake/Output Summary (Last 24 hours) at 07/04/19 1050  Last data filed at 07/03/19 1609   Gross per 24 hour   Intake             1100 ml   Output                5 ml   Net             1095 ml       Laboratory  Recent Labs      07/02/19   1340  07/03/19   0232   WBC  5.2  5.7   RBC  4.56  3.99*   HEMOGLOBIN  13.9  12.7   HEMATOCRIT  43.7  37.1   MCV  95.8  93.0   MCH  30.5  31.8   MCHC  31.8*  34.2   RDW  53.3*  52.2*   PLATELETCT  221  189   MPV  10.2  10.2     Recent Labs      07/02/19   1340  07/03/19   0232  07/04/19   0203   SODIUM  135  141  136   POTASSIUM  3.9  4.0  3.6   CHLORIDE  100  103  100   CO2  29  28  26   GLUCOSE  110*  92  92   BUN  20  18  16   CREATININE  0.64  0.62  0.68   CALCIUM  10.9*  9.7  9.7             Recent Labs      07/04/19   0203   TRIGLYCERIDE  36   HDL  51   LDL  116*       Imaging  DX-PORTABLE FLUORO > 1 HOUR   Final Result      Fluoroscopic image(s) obtained during ERCP. Please see the patient's chart for full procedural details.      Fluoroscopy time 2.6 minutes.         [id number]   Final Result      1.  Intrahepatic and extrahepatic biliary dilation with abrupt narrowing of the common bile duct at the pancreatic head with findings concerning for pancreatic head mass.   2.  Distended gallbladder.   3.  No stone demonstrated in the gallbladder or biliary tree.   4.  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      US-RUQ   Final Result      1.  Intrahepatic biliary dilatation with mildly dilated CBD. This may be due to CBD stone, stricture or mass.   2.  Gallbladder sludge with borderline thickened gallbladder wall. No gallstones identified. No pericholecystic fluid to suggest cholecystitis.   3.  Simple right renal cyst.      DX-CHEST-PORTABLE (1 VIEW)   Final Result      No acute cardiopulmonary process is seen.      Atherosclerotic plaque.      MR-ABDOMEN-WITH & W/O    (Results Pending)        Assessment/Plan  * Obstructive jaundice- (present on admission)    Assessment & Plan    - with right upper quadrant pain and jaundice.  Ultrasound showed mildly dilated common bile duct and intrahepatic biliary dilation.  MRCP showed intrahepatic and extrahepatic biliary dilation with abrupt narrowing of the common bile duct at the pancreatic head with findings concerning for pancreatic head mass, with distended gallbladder, with no stone demonstrated in the gallbladder or biliary tree. No evidence of acute cholecystitis on ultrasound. No signs of cholangitis.   - She had an unsuccessful ERCP on 7/3/19. GI to try again tomorrow.   - will get an MRI of the pancreas to further evaluate for mass. Surgery is on-board.  -Continue symptom control with IV morphine PRN. Start PRN oxycodone. Continue antiemetics.  -Continue to hold off on antibiotics as no signs of infection.      Chronic hepatitis B (HCC)- (present on admission)   Assessment & Plan    -Needs continued outpatient follow-up with GI.     Elevated LFTs- (present on admission)   Assessment & Plan    - Secondary to biliary obstruction of unknown etiology. Further work-up as above.     Severe protein-calorie malnutrition (HCC)- (present on admission)   Assessment & Plan    - RD consult.      HTN (hypertension)- (present on admission)   Assessment & Plan    -Continue home dose lisinopril.  Monitor blood pressure trend closely, and adjust antihypertensives if blood pressure remains elevated.     Dyslipidemia- (present on admission)   Assessment & Plan    - Not on home medications. Repeat lipid profile -  HDL 51, and LDL of 116, with total cholesterol 174.   - Reasonable to hold off on statin for now given transaminitis.          VTE prophylaxis: SCD

## 2019-07-04 NOTE — PROGRESS NOTES
Patient back on unit from PACU, /88. Dr. Bravo notiifed new orders placed, patient medicated per MAR. BP re-take at 1830.

## 2019-07-04 NOTE — PROGRESS NOTES
A&Ox4, reporting some mild pain to throat from being intubated early for procedure.  Chicken broth provided for pt.  Pt jaundice in color.  All needs met at this time.    Bed is locked and in lowest position, call light and bedside table are within reach, treaded slipper socks are on, and hourly rounding is in place.

## 2019-07-05 NOTE — ANESTHESIA PREPROCEDURE EVALUATION
Relevant Problems   (+) Chronic hepatitis B (HCC)   (+) HTN (hypertension)       Physical Exam    Airway   Mallampati: II  TM distance: >3 FB  Neck ROM: full       Cardiovascular - normal exam  Rhythm: regular  Rate: normal  (-) murmur     Dental - normal exam         Pulmonary - normal exam  Breath sounds clear to auscultation     Abdominal    Neurological - normal exam         Other findings: jaundice            Anesthesia Plan    ASA 3       Plan - general       Airway plan will be ETT        Induction: intravenous    Postoperative Plan: Postoperative administration of opioids is intended.    Pertinent diagnostic labs and testing reviewed    Informed Consent:    Anesthetic plan and risks discussed with patient.    Use of blood products discussed with: patient whom consented to blood products.

## 2019-07-05 NOTE — PROGRESS NOTES
Hospital Medicine Daily Progress Note    Date of Service  7/5/2019    Chief Complaint  Abdominal pain, jaundice    Hospital Course    84 y.o. female With hyperlipidemia, hypertension, admitted 7/2/2019 with right upper quadrant and epigastric abdominal pain with progressive jaundice over the last 4 days, along with dark urine and pale bowel movements.  Initial work-up showed no leukocytosis, with significant transaminitis with ALT of 283, AST of 237, total bilirubin of 14.5, and alkaline phosphatase of 505.  Lipase was normal.  Right upper quadrant ultrasound showed intrahepatic biliary dilation with mildly dilated CBD, with gallbladder sludge with borderline thickened gallbladder wall, with no pericholecystic fluid to suggest cholecystitis.  Viral hepatitis panel showed reactive hepatitis B surface antigen, with negative hepatitis B core antibody.   MRCP was ordered, and GI was consulted. MRCP showed intrahepatic and extrahepatic biliary dilation with abrupt narrowing of the common bile duct at the pancreatic head with findings concerning for pancreatic head mass, with distended gallbladder, with no stone demonstrated in the gallbladder or biliary tree.  She had an unsuccessful ERCP, with plan for repeat one by a different endoscopist.  Surgery was consulted.         Interval Problem Update  7/5/2019 - I reviewed the patient's chart today. Uneventful night. VSS. Afebrile. Saturating well on RA.  CA 1 9-9 is high at 849.3.  Viral hepatitis B DNA pending.  MRI of the abdomen showed several irregular cystic structures in the pancreatic head without any discrete infiltrative necrotic head mass identified, with moderately dilated main pancreatic duct with dilated side branches.  Scheduled for repeat ERCP today.    > I have personally seen and examined the patient today.  She is comfortable.  She only has minimal right upper quadrant pain, which she rated 4 out of 10 in severity.  She is not nauseated.  She continues to  have acholic stools.  No fevers or chills.  No chest pain or shortness of breath.      Consultants/Specialty  GI    Code Status  Full    Disposition  Monitor on the floors    Review of Systems  ROS     Pertinent positives/negatives as mentioned above.     A complete review of systems was personally done by me. All other systems were negative.       Physical Exam  Temp:  [36.7 °C (98 °F)-37.6 °C (99.7 °F)] 36.7 °C (98 °F)  Pulse:  [72-82] 79  Resp:  [16-19] 17  BP: (115-159)/(67-79) 115/72  SpO2:  [93 %-98 %] 93 %    Physical Exam   Constitutional: She is oriented to person, place, and time. She appears well-developed. No distress.   Cachectic. Body mass index is 20.14 kg/m².   HENT:   Head: Normocephalic and atraumatic.   Mouth/Throat: No oropharyngeal exudate.   Eyes: Pupils are equal, round, and reactive to light. Conjunctivae are normal.   Neck: Normal range of motion. Neck supple.   Cardiovascular: Normal rate and regular rhythm.  Exam reveals no gallop and no friction rub.    No murmur heard.  Pulmonary/Chest: Effort normal and breath sounds normal. No respiratory distress. She has no wheezes. She has no rales. She exhibits no tenderness.   Abdominal: Soft. Bowel sounds are normal. She exhibits no distension. There is tenderness (Minimal tenderness in the right upper quadrant.  No peritoneal signs.). There is no rebound and no guarding.   Musculoskeletal: Normal range of motion. She exhibits no edema or tenderness.   Lymphadenopathy:     She has no cervical adenopathy.   Neurological: She is alert and oriented to person, place, and time. No cranial nerve deficit.   Skin: Skin is warm and dry. No rash noted. She is not diaphoretic. No erythema. No pallor.   Significantly jaundiced skin   Psychiatric: She has a normal mood and affect. Her behavior is normal. Judgment and thought content normal.   Nursing note and vitals reviewed.           Fluids    Intake/Output Summary (Last 24 hours) at 07/05/19 9231  Last  data filed at 07/04/19 1903   Gross per 24 hour   Intake              120 ml   Output                0 ml   Net              120 ml       Laboratory  Recent Labs      07/02/19   1340  07/03/19   0232   WBC  5.2  5.7   RBC  4.56  3.99*   HEMOGLOBIN  13.9  12.7   HEMATOCRIT  43.7  37.1   MCV  95.8  93.0   MCH  30.5  31.8   MCHC  31.8*  34.2   RDW  53.3*  52.2*   PLATELETCT  221  189   MPV  10.2  10.2     Recent Labs      07/02/19   1340  07/03/19   0232  07/04/19   0203   SODIUM  135  141  136   POTASSIUM  3.9  4.0  3.6   CHLORIDE  100  103  100   CO2  29  28  26   GLUCOSE  110*  92  92   BUN  20  18  16   CREATININE  0.64  0.62  0.68   CALCIUM  10.9*  9.7  9.7             Recent Labs      07/04/19   0203   TRIGLYCERIDE  36   HDL  51   LDL  116*       Imaging  DX-PORTABLE FLUORO > 1 HOUR   Final Result      Fluoroscopic image(s) obtained during ERCP. Please see the patient's chart for full procedural details.      Fluoroscopy time 2.6 minutes.         [id number]   Final Result      1.  Intrahepatic and extrahepatic biliary dilation with abrupt narrowing of the common bile duct at the pancreatic head with findings concerning for pancreatic head mass.   2.  Distended gallbladder.   3.  No stone demonstrated in the gallbladder or biliary tree.   4.  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      US-RUQ   Final Result      1.  Intrahepatic biliary dilatation with mildly dilated CBD. This may be due to CBD stone, stricture or mass.   2.  Gallbladder sludge with borderline thickened gallbladder wall. No gallstones identified. No pericholecystic fluid to suggest cholecystitis.   3.  Simple right renal cyst.      DX-CHEST-PORTABLE (1 VIEW)   Final Result      No acute cardiopulmonary process is seen.      Atherosclerotic plaque.      MR-ABDOMEN-WITH & W/O    (Results Pending)   DX-PORTABLE FLUORO > 1 HOUR    (Results Pending)        Assessment/Plan  * Obstructive jaundice- (present on admission)   Assessment & Plan    - with right upper  quadrant pain and jaundice.  Ultrasound showed mildly dilated common bile duct and intrahepatic biliary dilation.  MRCP showed intrahepatic and extrahepatic biliary dilation with abrupt narrowing of the common bile duct at the pancreatic head with findings concerning for pancreatic head mass, with distended gallbladder, with no stone demonstrated in the gallbladder or biliary tree. No evidence of acute cholecystitis on ultrasound. No signs of cholangitis. Highly suspect pancreatica had malignancy, with significant elevated CA 1 9-9.  No discrete pancreatic mass seen on MRI.  -She will have a repeat ERCP today.  Depending on findings, may need endoscopic ultrasound for tissue diagnosis and staging.  -Continue symptom control with IV morphine PRN, and PRN oxycodone. Continue antiemetics.  -Continue to hold off on antibiotics as no signs of infection.      Chronic hepatitis B (HCC)- (present on admission)   Assessment & Plan    -Pending viral DNA. GI following, considering starting treatment.      Elevated LFTs- (present on admission)   Assessment & Plan    - Secondary to biliary obstruction of unknown etiology. Further work-up as above.     Severe protein-calorie malnutrition (HCC)- (present on admission)   Assessment & Plan    - RD consult.      HTN (hypertension)- (present on admission)   Assessment & Plan    -Continue home dose lisinopril.  Monitor blood pressure trend closely, and adjust antihypertensives if blood pressure remains elevated.     Dyslipidemia- (present on admission)   Assessment & Plan    - Not on home medications. Repeat lipid profile -  HDL 51, and LDL of 116, with total cholesterol 174.   - Reasonable to hold off on statin for now given transaminitis.          VTE prophylaxis: SCD

## 2019-07-05 NOTE — ANESTHESIA POSTPROCEDURE EVALUATION
Patient: Katelyn Hill    Procedure Summary     Date:  07/05/19 Room / Location:  [facility] OR 06 / SURGERY [facility]    Anesthesia Start:  1429 Anesthesia Stop:  1531    Procedure:  ERCP (ENDOSCOPIC RETROGRADE CHOLANGIOPANCREATOGRAPHY) (N/A Esophagus) Diagnosis:  (obstructive jaundice, unable to complete ERCP)    Surgeon:  Sean Nguyen D.O. Responsible Provider:  Albin Jansen M.D.    Anesthesia Type:  general ASA Status:  3          Final Anesthesia Type: general  Last vitals  BP   Blood Pressure : (!) 176/95, NIBP: 160/86    Temp   36.5 °C (97.7 °F)    Pulse   Pulse: 90, Heart Rate (Monitored): (!) 126   Resp   18    SpO2   95 %      Anesthesia Post Evaluation    Patient location during evaluation: PACU  Patient participation: complete - patient participated  Level of consciousness: awake and alert    Airway patency: patent  Anesthetic complications: no  Cardiovascular status: hemodynamically stable  Respiratory status: acceptable  Hydration status: euvolemic    PONV: none           Nurse Pain Score: 1 (NPRS)

## 2019-07-05 NOTE — CARE PLAN
"Problem: Communication  Goal: The ability to communicate needs accurately and effectively will improve  Outcome: PROGRESSING AS EXPECTED  Pt is able to make her needs known with clear speech and ideation.  has not been utilized thus far this shift, this RN has been able to communicate with pt in [place].    Problem: Pain Management  Goal: Pain level will decrease to patient's comfort goal  Outcome: PROGRESSING AS EXPECTED  Pt is very adamant that she does not want opioid pain relievers as they make her constipated. When asked if she was in any pain, pt stated \"a little bit\" in her abdomen and also stated that she is in chronic pain from osteoarthritis.       "

## 2019-07-05 NOTE — PROGRESS NOTES
"Assumed care of pt from Isela Stephen. Pt has complaints of \"a little bit\" of pain secondary to arthritis at this time.     Call light within reach, treaded slipper socks on, bed locked in lowest position. Mobility and fall risk assessed- proper communication signs are in place.   "

## 2019-07-05 NOTE — OR NURSING
1527  RECEIVED PATIENT FROM OR.  REPORT FROM DR. CHACON.  NO AIRWAY IN PLACE.  RESPIRATIONS ARE EVEN AND UNLABORED.  PATIENT DENIES PAIN.  SKIN AND SCLERA ARE JAUNDICED.      1626  REPORT TO GOVIND WILDER.

## 2019-07-05 NOTE — PROCEDURES
DATE OF SERVICE:  07/05/2019    PROCEDURE ATTEMPTED:  Endoscopic retrograde cholangiopancreatography.    PREOPERATIVE DIAGNOSIS:  Biliary obstruction.    POSTOPERATIVE DIAGNOSIS:  Biliary obstruction.    ANESTHESIA:  General anesthesia.    DESCRIPTION OF PROCEDURE:  After informed consent and appropriate sedation,   the patient was placed in the appropriate position.  The duodenoscope was   advanced through the oropharynx into the esophagus.  Numerous positions were   needed to change the angle to allow for the duodenoscope advance into the   esophagus as it was quite difficult.  Ultimately, it was successful and the   scope was advanced through into the stomach through to the second portion of   the duodenum.  The scope was then shortened.  The ampulla was brought into   good visualization and numerous attempts were made at cannulation with a 0.025   guidewire.  However, after numerous attempts, I was unable to pass the   ampulla.  The initial portion of the ampulla was easily identified as well as   cannulated with the tip of the sphincterotome; however, the guidewire kept   hitting heart tissue and was unable to pass.  There was one instance where   bile did come out of the common bile duct through the ampulla.  The scope was   then withdrawn.  There were no immediate postop complications.    RECOMMENDATIONS:  1.  Recommend interventional radiology.  2.  We will consult interventional radiology and see if they can drain the   bile ducts.  3.  Patient will need outpatient EUS.  I will have the office coordinate this   as an outpatient.  This will be done for FNA to obtain biopsies and stage.  4.  Please call with any questions or concerns.  The patient may advance her   diet as tolerated.  For today, just keep the patient n.p.o. after midnight in   anticipation of possible IR procedure.       ____________________________________     DO MALENA Huffman / HELADIO    DD:  07/05/2019 15:10:12  DT:  07/05/2019  16:52:59    D#:  [id number]  Job#:  [id number]

## 2019-07-05 NOTE — ANESTHESIA QCDR
2019 [facility] Clinical Data Registry (for Quality Improvement)     Postoperative nausea/vomiting risk protocol (Adult = 18 yrs and Pediatric 3-17 yrs)- (430 and 463)  General inhalation anesthetic (NOT TIVA) with PONV risk factors: Yes  Provision of anti-emetic therapy with at least 2 different classes of agents: Yes   Patient DID NOT receive anti-emetic therapy and reason is documented in Medical Record:  N/A    Multimodal Pain Management- (AQI59)  Patient undergoing Elective Surgery (i.e. Outpatient, or ASC, or Prescheduled Surgery prior to Hospital Admission): Yes  Use of Multimodal Pain Management, two or more drugs and/or interventions, NOT including systemic opioids: Yes   Exception: Documented allergy to multiple classes of analgesics:  N/A    PACU assessment of acute postoperative pain prior to Anesthesia Care End- Applies to Patients Age = 18- (ABG7)  Initial PACU pain score is which of the following: < 7/10  Patient unable to report pain score: N/A    Post-anesthetic transfer of care checklist/protocol to PACU/ICU- (426 and 427)  Upon conclusion of case, patient transferred to which of the following locations: PACU/Non-ICU  Use of transfer checklist/protocol: Yes  Exclusion: Service Performed in Patient Hospital Room (and thus did not require transfer): N/A    PACU Reintubation- (AQI31)  General anesthesia requiring endotracheal intubation (ETT) along with subsequent extubation in OR or PACU: No  Required reintubation in the PACU: N/A  Extubation was a planned trial documented in the medical record prior to removal of the original airway device: N/A    Unplanned admission to ICU related to anesthesia service up through end of PACU care- (MD51)  Unplanned admission to ICU (not initially anticipated at anesthesia start time): No

## 2019-07-05 NOTE — ANESTHESIA PROCEDURE NOTES
Airway  Date/Time: 7/5/2019 2:31 PM  Performed by: TRACY CHACON  Authorized by: TRACY CHACON     Location:  OR  Urgency:  Elective  Indications for Airway Management:  Anesthesia  Spontaneous Ventilation: absent    Sedation Level:  Deep  Preoxygenated: Yes    Patient Position:  Sniffing  Final Airway Type:  Endotracheal airway  Final Endotracheal Airway:  ETT  Cuffed: Yes    Technique Used for Successful ETT Placement:  Video laryngoscopy  Insertion Site:  Oral  Blade Type:  Linda  Laryngoscope Blade/Videolaryngoscope Blade Size:  3  ETT Size (mm):  6.5  Measured from:  Teeth  ETT to Teeth (cm):  22  Placement Verified by: auscultation and capnometry    Cormack-Lehane Classification:  Grade I - full view of glottis  Number of Attempts at Approach:  1

## 2019-07-05 NOTE — ANESTHESIA TIME REPORT
Anesthesia Start and Stop Event Times     Date Time Event    7/5/2019 1429 Anesthesia Start     1531 Anesthesia Stop        Responsible Staff  07/05/19    Name Role Begin End    Albin Jansen M.D. Anesth 1447 1531        Preop Diagnosis (Free Text):  Pre-op Diagnosis     obstructive jaundice        Preop Diagnosis (Codes):  Diagnosis Information     Diagnosis Code(s):         Post op Diagnosis  Obstructive jaundice      Premium Reason  Non-Premium    Comments:

## 2019-07-06 NOTE — PROGRESS NOTES
IR Procedure Note:    Dr. Lan consented patient prior to procedure; all questions answered.    Site confirmed with imaging guidance by patient, physician, RT, and RN.     Dr. Lan completed a percutanous transhaepatic cholangiogram with internal/ternal drain placement.  The patient tolerated the procedure well; ETCo2 range 27-31, with consistent waveform during the procedure.      Metapore tape and gauze applied to left lateral abdomen CDI and soft.  Patient alert, oriented and verbally appropriate post procedure, patient remained hemodynamically stable during procedure and transport, see flow sheet for vital signs.  Report given to TINA Casas.  RN transported patient to S630 with SaO2 monitor @ 98 % on oxygen via NC on  2  lpm.     Sedation End Time: 1241

## 2019-07-06 NOTE — PROGRESS NOTES
Received call from IR asking if pt is NPO and if pt is on any blood thinners. This RN told IR that the pt has been NPO since 0000 and has not eaten anything since dinnertime yesterday. Confirmed that pt is NOT on any blood thinners. IR also pointed out that the pt's most recent coag panel was in 2015 and asked for a more recent one.   Page sent out to Dr. Bermudez for coag panel order

## 2019-07-06 NOTE — CARE PLAN
"Problem: Knowledge Deficit  Goal: Knowledge of disease process/condition, treatment plan, diagnostic tests, and medications will improve  Outcome: PROGRESSING AS EXPECTED  Pt understands that she must be NPO starting 7/6 for another procedure in the AM. Pt understands the importance of complying with NPO status, stating, \"I have to do what I have to do\"    Problem: SKIN INTEGRITY  Goal: Skin Integrity is maintained or improved  Outcome: PROGRESSING SLOWER THAN EXPECTED  Pt's sacrum is red and excoriated. Pt provided with kirk cream which was applied liberally. Educated pt to tell staff when she has had an episode of incontinence so we can clean her up as to reduce risk for skin breakdown.       "

## 2019-07-06 NOTE — PROGRESS NOTES
Hospital Medicine Daily Progress Note    Date of Service  7/6/2019    Chief Complaint  Abdominal pain, jaundice    Hospital Course    84 y.o. female With hyperlipidemia, hypertension, admitted 7/2/2019 with right upper quadrant and epigastric abdominal pain with progressive jaundice over the last 4 days, along with dark urine and pale bowel movements.  Initial work-up showed no leukocytosis, with significant transaminitis with ALT of 283, AST of 237, total bilirubin of 14.5, and alkaline phosphatase of 505.  Lipase was normal.  Right upper quadrant ultrasound showed intrahepatic biliary dilation with mildly dilated CBD, with gallbladder sludge with borderline thickened gallbladder wall, with no pericholecystic fluid to suggest cholecystitis.  Viral hepatitis panel showed reactive hepatitis B surface antigen, with negative hepatitis B core antibody.   MRCP was ordered, and GI was consulted. MRCP showed intrahepatic and extrahepatic biliary dilation with abrupt narrowing of the common bile duct at the pancreatic head with findings concerning for pancreatic head mass, with distended gallbladder, with no stone demonstrated in the gallbladder or biliary tree.  She had an unsuccessful ERCP, with plan for repeat one by a different endoscopist. MRI of the abdomen showed several irregular cystic structures in the pancreatic head without any discrete infiltrative necrotic head mass identified, with moderately dilated main pancreatic duct with dilated side branches. CA 19-9 is high at 849.3. Surgery was consulted.         Interval Problem Update  7/6/2019 - I reviewed the patient's chart. There were no significant overnight events. Remains hemodynamically stable and afebrile. Stable on RA.  She had another attempt at ERCP, but after numerous attempts was unable to pass the ampulla as the guidewire kept hitting hard tissue and was unable to pass.  GI recommended IR percutaneous biliary drainage. HBV was detected with  quantitative testing by [facility], 153 IU/mL. HBE Ag was negative.    > I have personally seen and examined the patient today.  She remains to be in good spirits.  No complaints except for persistent but unchanged right upper quadrant pain mostly with pressure/palpation.  No nausea or vomiting.  Still with acholic stool.  No chest pain or shortness of breath.  No fevers or chills.      Consultants/Specialty  GI  IR    Code Status  Full    Disposition  Monitor on the floors    Review of Systems  ROS     Pertinent positives/negatives as mentioned above.     A complete review of systems was personally done by me. All other systems were negative.       Physical Exam  Temp:  [36.1 °C (97 °F)-36.8 °C (98.2 °F)] 36.6 °C (97.9 °F)  Pulse:  [70-96] 71  Resp:  [14-25] 16  BP: ()/(59-95) 126/63  SpO2:  [92 %-100 %] 92 %    Physical Exam   Constitutional: She is oriented to person, place, and time. She appears well-developed. No distress.   Cachectic. Body mass index is 20.14 kg/m².   HENT:   Head: Normocephalic and atraumatic.   Mouth/Throat: No oropharyngeal exudate.   Eyes: Pupils are equal, round, and reactive to light. Conjunctivae are normal.   Neck: Normal range of motion. Neck supple.   Cardiovascular: Normal rate and regular rhythm.  Exam reveals no gallop and no friction rub.    No murmur heard.  Pulmonary/Chest: Effort normal and breath sounds normal. No respiratory distress. She has no wheezes. She has no rales. She exhibits no tenderness.   Abdominal: Soft. Bowel sounds are normal. She exhibits no distension. There is tenderness (Minimal tenderness in the right upper quadrant.  No peritoneal signs.). There is no rebound and no guarding.   Musculoskeletal: Normal range of motion. She exhibits no edema or tenderness.   Lymphadenopathy:     She has no cervical adenopathy.   Neurological: She is alert and oriented to person, place, and time. No cranial nerve deficit.   Skin: Skin is warm and dry. No rash noted. She is  not diaphoretic. No erythema. No pallor.   Significantly jaundiced skin   Psychiatric: She has a normal mood and affect. Her behavior is normal. Judgment and thought content normal.   Nursing note and vitals reviewed.       I have performed the physical examination today 7/6/2019.  In review of yesterday's note, there are no new changes except as documented above.      Fluids    Intake/Output Summary (Last 24 hours) at 07/06/19 1020  Last data filed at 07/05/19 1515   Gross per 24 hour   Intake              250 ml   Output                0 ml   Net              250 ml       Laboratory      Recent Labs      07/04/19   0203   SODIUM  136   POTASSIUM  3.6   CHLORIDE  100   CO2  26   GLUCOSE  92   BUN  16   CREATININE  0.68   CALCIUM  9.7     Recent Labs      07/06/19   0745   APTT  25.9   INR  1.04         Recent Labs      07/04/19   0203   TRIGLYCERIDE  36   HDL  51   LDL  116*       Imaging  DX-PORTABLE FLUOROSCOPY < 1 HOUR   Preliminary Result         Portable fluoroscopy utilized for 3 seconds.      MR-ABDOMEN-WITH & W/O   Final Result         1.  Intrahepatic and extrahepatic ductal dilatation, consistent with known biliary obstruction. Gallbladder distention and sludge is likely related to obstruction.      2.  Several irregular cystic structures in the pancreatic head without a discrete infiltrative pancreatic head mass identified. Moderately dilated main pancreatic duct with dilated side branches. No definite vascular encasement identified. If clinically    indicated, pancreatic protocol CT may be helpful for further evaluation.      3.  Atherosclerosis.      4.  Bilateral renal cysts.               DX-PORTABLE FLUORO > 1 HOUR   Final Result      Fluoroscopic image(s) obtained during ERCP. Please see the patient's chart for full procedural details.      Fluoroscopy time 2.6 minutes.         [id number]   Final Result      1.  Intrahepatic and extrahepatic biliary dilation with abrupt narrowing of the common  bile duct at the pancreatic head with findings concerning for pancreatic head mass.   2.  Distended gallbladder.   3.  No stone demonstrated in the gallbladder or biliary tree.   4.  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      US-RUQ   Final Result      1.  Intrahepatic biliary dilatation with mildly dilated CBD. This may be due to CBD stone, stricture or mass.   2.  Gallbladder sludge with borderline thickened gallbladder wall. No gallstones identified. No pericholecystic fluid to suggest cholecystitis.   3.  Simple right renal cyst.      DX-CHEST-PORTABLE (1 VIEW)   Final Result      No acute cardiopulmonary process is seen.      Atherosclerotic plaque.      IR-PERQ BILIARY CATH PLACEMT INT-EXT  (INCLUDES ALL RADIOLOGY)    (Results Pending)        Assessment/Plan  * Obstructive jaundice- (present on admission)   Assessment & Plan    - with right upper quadrant pain and jaundice.  Ultrasound showed mildly dilated common bile duct and intrahepatic biliary dilation.  MRCP showed intrahepatic and extrahepatic biliary dilation with abrupt narrowing of the common bile duct at the pancreatic head with findings concerning for pancreatic head mass, with distended gallbladder, with no stone demonstrated in the gallbladder or biliary tree. No evidence of acute cholecystitis on ultrasound. No signs of cholangitis. 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  No discrete pancreatic mass seen on MRI.  -failed attempts at ERCP. IR to do percutaneous biliary drainage.   -will need endoscopic ultrasound for tissue diagnosis and staging, GI to arrange.  -trend LFTs following perc drainage.  -Continue symptom control with IV morphine PRN, and PRN oxycodone. Continue antiemetics.  -Continue to hold off on antibiotics as no signs of infection.      Chronic hepatitis B (HCC)- (present on admission)   Assessment & Plan    -GI following, considering starting treatment.      Elevated LFTs- (present on admission)    Assessment & Plan    - Secondary to biliary obstruction of unknown etiology. Further work-up as above.     Severe protein-calorie malnutrition (HCC)- (present on admission)   Assessment & Plan    - RD consult.      HTN (hypertension)- (present on admission)   Assessment & Plan    -Continue home dose lisinopril.  Monitor blood pressure trend closely, and adjust antihypertensives if blood pressure remains elevated.     Dyslipidemia- (present on admission)   Assessment & Plan    - Not on home medications. Repeat lipid profile -  HDL 51, and LDL of 116, with total cholesterol 174.   - Reasonable to hold off on statin for now given transaminitis.          VTE prophylaxis: SCD

## 2019-07-06 NOTE — OR SURGEON
Immediate Post- Operative Note        PostOp Diagnosis: BILIARY OBSTRUCTION       Procedure(s): PERCUTANEOUS TRANSHEPATIC CHOLANGIOGRAM, LEFT SIDED 8F LOCKING LOOP INTERNAL-EXTERNAL BILIARY STENT CATHETER DRAINAGE, ULTRASOUND AND FLUOROSCOPIC GUIDANCE    ABRUPT OCCLUSION DISTAL CBD. PATENT CYSTIC DUCT WITH GB FILLING.     MODERATE DILATATION INTRAHEPATIC AND EXTRA-HEPATIC BILE DUCTS.     BILE SPECIMEN SENT FOR C+S, GRAM STAIN, CELL COUNT    Estimated Blood Loss: <3CC        Complications: NONE        7/6/2019     12:46 PM     Martin Lan

## 2019-07-06 NOTE — PROGRESS NOTES
Assumed care of pt from Isela Ramos. Pt has no complaints of pain/discomfort at this time. Pt's son at bedside. Ordered a new dinner for pt as the first meal was unappetizing.   Call light within reach, treaded slipper socks on, bed locked in lowest position. Mobility and fall risk assessed- proper communication signs are in place.

## 2019-07-06 NOTE — CONSULTS
DATE OF SERVICE:  07/06/2019    REASON FOR SURGICAL CONSULTATION:  Biliary obstruction.    HISTORY OF PRESENT ILLNESS:  The patient is an 84-year-old female lives   independently at home who presented to the emergency department with 2-week   history of worsening epigastric pain and early satiety.  The patient also   began experiencing darkened urine and acholic stools approximately 4 days   prior to admission.  She describes the pain as a dull ache worsening with   eating.  She feels like food is getting stuck below her sternum.  She did have   1 episode of vomiting that she stated was bilious.  Denies hematemesis.  The   patient reports gradual 45-pound weight loss in the last few years.    PAST MEDICAL HISTORY:  Hypertension, dyslipidemia, nephrolithiasis, arthritis,   Raynaud's.    HOME MEDICATIONS:  None.    SURGERIES:  Open appendectomy, rotator cuff surgery, tonsillectomy, knee   arthroscopy.    ALLERGIES:  IODINE, ASPIRIN, CLINDAMYCIN, ROBITUSSIN, PENICILLIN, SHELLFISH,   TETANUS, AND AZITHROMYCIN.    SOCIAL HISTORY:  The patient is a former smoker.  She quit approximately 30   years ago, previously 30-pack-year smoking history.  Denies alcohol or illicit   drugs.    FAMILY HISTORY:  Positive for non-GI cancer, lung cancer in maternal   grandmother.  Positive for heart disease in her mother and father.    REVIEW OF SYSTEMS:  Patient denies chest pain or shortness of breath.  No dark   tarry stools.  All other review of systems on a 10-point review except for   that stated in the HPI are negative.    PHYSICAL EXAMINATION:  VITAL SIGNS:  Temperature 36.6, heart rate 70s, blood pressure 120/63, sats   are 95% on room air.  GENERAL:  The patient is alert and oriented x3, in no apparent distress.    Patient is a thin elderly, slightly frail, but nontoxic appearing.  HEENT:  Pupils equal, round and reactive to light.  Mucous membranes are   moist.  There is scleral icterus.  NECK:  No JVD.  Trachea  midline.  CARDIOVASCULAR:  Regular rate and rhythm.  EXTREMITIES:  Warm.  No edema.  PULMONARY:  Normal respiratory effort.  LUNGS:  Clear to auscultation.  No wheezes or stridor.  ABDOMEN:  Soft, flat.  There is some fullness and tenderness in the   epigastrium without rebound or guarding.  No hepatomegaly.  Well-healed right   lower quadrant incision without palpable hernias.  SKIN:  Warm and well perfused.  No petechiae or rashes.  Jaundice present.  NEUROLOGIC:  No asterixis.  Cranial nerves II-XII grossly intact.  Normal   sensation and strength in bilateral upper extremities.  No clubbing or   cyanosis.    IMAGING:  Ultrasound showed liver of 13 cm.  Normal contour.  No   cholelithiasis.  There is gallbladder sludge present with wall of 0.3 cm,   common duct 1 cm, intrahepatic biliary dilatation present.  MRCP performed   showed dilatation of the intrahepatic and extrahepatic biliary duct.    Gallbladder distention and sludge present.  There are cystic structures in the   pancreatic head without discrete infiltrate of the pancreatic mass.    Moderately dilated main pancreatic duct.  Findings concerning for mass in the   head of the pancreas.    LABORATORY DATA:  White count 5.7, hemoglobin 12, hematocrit 37, platelets   189.  Sodium 136, potassium 3.6, chloride 100, bicarbonate 26, BUN 16,   creatinine 0.6, , , alkaline phosphatase 384, total bilirubin of   14.7, albumin 3.3, lipase 20.  PT 13, INR 1.04, PTT 25.9.    ASSESSMENT AND PLAN:  An 84-year-old female with jaundice.  Laboratory imaging   findings consistent with biliary duct obstruction likely secondary to mass in   the head of the pancreas.  Attempted endoscopic retrograde   cholangiopancreatography x2 revealed patient has aberrant pancreatic duct   including pancreatic divisum and cannulation of the main duct was unable to be   performed after 2 attempts.  Plan for at this time is percutaneous   transhepatic cholangiography by  interventional radiology for decompression of   the biliary system.  Also plan for further workup including endoscopic   ultrasound, possible biopsies as an outpatient.  There is no indication for   urgent surgical intervention at this time.  Recommend nutritional   optimization.  The patient is currently tolerating p.o., but monitor for signs   of gastric outlet obstruction.  Supplemental protein shakes b.i.d. as   tolerated.  Pending workup, also recommend referral to hepatobiliary surgery   as an outpatient, Dr. Avalos.  Will sign off.  Please call back   general surgery for any questions or concerns.       ____________________________________     Kathy Jones MD AEP / NTS    DD:  07/06/2019 08:50:05  DT:  07/06/2019 12:54:02    D#:  [id number]  Job#:  [id number]

## 2019-07-06 NOTE — CARE PLAN
Problem: Discharge Barriers/Planning  Goal: Patient's continuum of care needs will be met  Outcome: PROGRESSING AS EXPECTED  Pt NPO since midnight, anticoags held, IR procedure this morning    Problem: Pain Management  Goal: Pain level will decrease to patient's comfort goal  Outcome: PROGRESSING AS EXPECTED  Pt denies pain at rest, some abdominal tenderness however not requiring medications  Rest and repositionng

## 2019-07-07 NOTE — CARE PLAN
Problem: Safety  Goal: Will remain free from falls  Outcome: PROGRESSING AS EXPECTED  Educated patient to call RN if she would like to get out of bed. Patient verbalized understanding. Hourly rounding in place.    Problem: SKIN INTEGRITY  Goal: Skin Integrity is maintained or improved  Outcome: PROGRESSING AS EXPECTED  Educated patient to let RN or CNA know as soon as she goes to the bathroom. Patient verbalized understanding. Barrier cream and wipes in use on patient's sacrum to prevent skin breakdown.

## 2019-07-07 NOTE — PROGRESS NOTES
Assumed care of pt from Isela Martinez. Pt has complaints of 10/10 pain when moving and 5/10 at rest.  Established comfort goal of 2-3/10 at rest and 5-6/10 when ambulating. Normally pt does not like to take pain killers but understands the importance of pain control post surgery. Sons at bedside also verbalize understanding with this plan    Hourly rounding in place. Call light within reach, treaded slipper socks on, bed locked in lowest position. Mobility and fall risk assessed- proper communication signs are in place.

## 2019-07-07 NOTE — PROGRESS NOTES
Hospital Medicine Daily Progress Note    Date of Service  7/7/2019    Chief Complaint  Abdominal pain, jaundice    Hospital Course    84 y.o. female With hyperlipidemia, hypertension, admitted 7/2/2019 with right upper quadrant and epigastric abdominal pain with progressive jaundice over the last 4 days, along with dark urine and pale bowel movements.  Initial work-up showed no leukocytosis, with significant transaminitis with ALT of 283, AST of 237, total bilirubin of 14.5, and alkaline phosphatase of 505.  Lipase was normal.  Right upper quadrant ultrasound showed intrahepatic biliary dilation with mildly dilated CBD, with gallbladder sludge with borderline thickened gallbladder wall, with no pericholecystic fluid to suggest cholecystitis.  Viral hepatitis panel showed reactive hepatitis B surface antigen, with negative hepatitis B core antibody.   MRCP was ordered, and GI was consulted. MRCP showed intrahepatic and extrahepatic biliary dilation with abrupt narrowing of the common bile duct at the pancreatic head with findings concerning for pancreatic head mass, with distended gallbladder, with no stone demonstrated in the gallbladder or biliary tree.  She had an unsuccessful ERCP, with plan for repeat one by a different endoscopist. MRI of the abdomen showed several irregular cystic structures in the pancreatic head without any discrete infiltrative necrotic head mass identified, with moderately dilated main pancreatic duct with dilated side branches. CA 19-9 is high at 849.3. Surgery was consulted. She had another attempt at ERCP, but after numerous attempts was unable to pass the ampulla as the guidewire kept hitting hard tissue and was unable to pass.  GI recommended IR percutaneous biliary drainage. HBV was detected with quantitative testing by [facility], 153 IU/mL. HBE Ag was negative.           Interval Problem Update  7/7/2019 - I reviewed the patient's chart today. Uneventful night. VSS. Afebrile. Saturating  well on RA.  She had a percutaneous transhepatic cholangiogram, with placement of percutaneous drainage.  Biliary fluid Gram stain showed no organisms.  LFTs slightly improved, with AST of 112, ALT of 149, and alkaline phosphatase of 360, and total bilirubin down to 9.1.  Surgery recommended no urgent surgical intervention as needed, and to follow-up with outpatient hepatobiliary surgery.  GI planning to do EUS during this hospitalization.    > I have personally seen and examined the patient today.  She feels hungry.  She does not have any nausea or vomiting.  She states she had terrible pain last night, but much better today.  She has her biliary drain in place.        Consultants/Specialty  GI  IR    Code Status  Full    Disposition  Monitor on the floors    Review of Systems  ROS     Pertinent positives/negatives as mentioned above.     A complete review of systems was personally done by me. All other systems were negative.     Physical Exam  Temp:  [36.4 °C (97.5 °F)-37.4 °C (99.4 °F)] 36.4 °C (97.5 °F)  Pulse:  [61-99] 70  Resp:  [11-19] 17  BP: (110-163)/(45-88) 123/50  SpO2:  [90 %-100 %] 90 %    Physical Exam   Constitutional: She is oriented to person, place, and time. She appears well-developed. No distress.   Cachectic. Body mass index is 20.14 kg/m².   HENT:   Head: Normocephalic and atraumatic.   Mouth/Throat: No oropharyngeal exudate.   Eyes: Pupils are equal, round, and reactive to light. Conjunctivae are normal.   Neck: Normal range of motion. Neck supple.   Cardiovascular: Normal rate and regular rhythm.  Exam reveals no gallop and no friction rub.    No murmur heard.  Pulmonary/Chest: Effort normal and breath sounds normal. No respiratory distress. She has no wheezes. She has no rales. She exhibits no tenderness.   Abdominal: Soft. Bowel sounds are normal. She exhibits no distension. There is tenderness (Minimal tenderness in the right upper quadrant.  No peritoneal signs.). There is no rebound and  no guarding.   Percutaneous biliary drain in place, draining dark-colored bilious fluid.  Minimal tenderness on the right upper quadrant.   Musculoskeletal: Normal range of motion. She exhibits no edema or tenderness.   Lymphadenopathy:     She has no cervical adenopathy.   Neurological: She is alert and oriented to person, place, and time. No cranial nerve deficit.   Skin: Skin is warm and dry. No rash noted. She is not diaphoretic. No erythema. No pallor.   Significantly jaundiced skin   Psychiatric: She has a normal mood and affect. Her behavior is normal. Judgment and thought content normal.   Nursing note and vitals reviewed.             Fluids    Intake/Output Summary (Last 24 hours) at 07/07/19 1043  Last data filed at 07/07/19 0600   Gross per 24 hour   Intake                0 ml   Output              375 ml   Net             -375 ml       Laboratory      Recent Labs      07/07/19   0223   SODIUM  142   POTASSIUM  5.3   CHLORIDE  105   CO2  34*   GLUCOSE  91   BUN  29*   CREATININE  0.78   CALCIUM  9.9     Recent Labs      07/06/19   0745   APTT  25.9   INR  1.04               Imaging  IR-PERQ BILIARY CATH PLACEMT INT-EXT  (INCLUDES ALL RADIOLOGY)   Final Result      1. Ultrasound and fluoroscopic guided percutaneous transhepatic cholangiogram demonstrating distal common bile duct occlusion concordant with ERCP findings. Moderate dilatation of intrahepatic and extrahepatic biliary ducts.      2. Percutaneous biliary drainage with placement of an 8 [employer] locking loop internal-external biliary stent-catheter. The catheter is connected to gravity drainage at this time. When hyperbilirubinemia resolves, the catheter can be capped to    allow antegrade physiologic bile drainage. The distal common duct obstruction may be amenable to metallic self-expanding internal stent placement by antegrade technique or rendezvous endoscopic technique.         DX-PORTABLE FLUOROSCOPY < 1 HOUR   Preliminary Result          Portable fluoroscopy utilized for 3 seconds.      MR-ABDOMEN-WITH & W/O   Final Result         1.  Intrahepatic and extrahepatic ductal dilatation, consistent with known biliary obstruction. Gallbladder distention and sludge is likely related to obstruction.      2.  Several irregular cystic structures in the pancreatic head without a discrete infiltrative pancreatic head mass identified. Moderately dilated main pancreatic duct with dilated side branches. No definite vascular encasement identified. If clinically    indicated, pancreatic protocol CT may be helpful for further evaluation.      3.  Atherosclerosis.      4.  Bilateral renal cysts.               DX-PORTABLE FLUORO > 1 HOUR   Final Result      Fluoroscopic image(s) obtained during ERCP. Please see the patient's chart for full procedural details.      Fluoroscopy time 2.6 minutes.         [id number]   Final Result      1.  Intrahepatic and extrahepatic biliary dilation with abrupt narrowing of the common bile duct at the pancreatic head with findings concerning for pancreatic head mass.   2.  Distended gallbladder.   3.  No stone demonstrated in the gallbladder or biliary tree.   4.  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      US-RUQ   Final Result      1.  Intrahepatic biliary dilatation with mildly dilated CBD. This may be due to CBD stone, stricture or mass.   2.  Gallbladder sludge with borderline thickened gallbladder wall. No gallstones identified. No pericholecystic fluid to suggest cholecystitis.   3.  Simple right renal cyst.      DX-CHEST-PORTABLE (1 VIEW)   Final Result      No acute cardiopulmonary process is seen.      Atherosclerotic plaque.           Assessment/Plan  * Obstructive jaundice- (present on admission)   Assessment & Plan    - with right upper quadrant pain and jaundice.  Ultrasound showed mildly dilated common bile duct and intrahepatic biliary dilation.  MRCP showed intrahepatic and extrahepatic biliary dilation with abrupt narrowing  of the common bile duct at the pancreatic head with findings concerning for pancreatic head mass, with distended gallbladder, with no stone demonstrated in the gallbladder or biliary tree. No evidence of acute cholecystitis on ultrasound. No signs of cholangitis. 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  No discrete pancreatic mass seen on MRI.  -failed attempts at ERCP x2. Now has percutaneous biliary drainage.   -GI planning for EUS during this hospitalization.  GI to arrange.  -Will need outpatient referral/follow-up with hepatobiliary surgery (Dr. Pat).  -continue to trend LFTs.  -Continue symptom control with IV morphine PRN, and PRN oxycodone. Continue antiemetics.  -Continue to hold off on antibiotics as no signs of infection. Follow biliary culture.     Chronic hepatitis B (HCC)- (present on admission)   Assessment & Plan    -GI following, considering starting treatment.      Elevated LFTs- (present on admission)   Assessment & Plan    - Secondary to biliary obstruction of unknown etiology. Further work-up as above.     Severe protein-calorie malnutrition (HCC)- (present on admission)   Assessment & Plan    - RD consult.      HTN (hypertension)- (present on admission)   Assessment & Plan    -Continue home dose lisinopril.  Monitor blood pressure trend closely, and adjust antihypertensives if blood pressure remains elevated.     Dyslipidemia- (present on admission)   Assessment & Plan    - Not on home medications. Repeat lipid profile -  HDL 51, and LDL of 116, with total cholesterol 174.   - Reasonable to hold off on statin for now given transaminitis.          VTE prophylaxis: SCD

## 2019-07-07 NOTE — PROGRESS NOTES
Gastroenterology Progress Note     Author: Sean Nguyen   Date & Time Created: 2019 8:38 AM    Chief Complaint:  Abdominal pain    Interval History:  Underwent IR procedure yesterday - int/ext biliary drain placed. Bilirubin improved. Patient has had 2 failed attempts at ERCP, cholangiogram yesterday showed complete obstruction abruptly near distal CBD.     Review of Systems:  ROS    Physical Exam:  Physical Exam   Constitutional: She is oriented to person, place, and time. She appears well-developed and well-nourished.   HENT:   Head: Normocephalic and atraumatic.   Eyes: Scleral icterus is present.   Neck: Normal range of motion. Neck supple.   Cardiovascular: Normal rate and regular rhythm.    Pulmonary/Chest: Effort normal and breath sounds normal. No respiratory distress. She has no wheezes.   Abdominal: Soft. There is tenderness. There is no rebound and no guarding.   +epigastric TTP   Musculoskeletal: Normal range of motion.   Neurological: She is alert and oriented to person, place, and time.   Skin: Skin is warm and dry.   +jaundiced   Psychiatric: She has a normal mood and affect. Her behavior is normal.       Labs:          Recent Labs      19   0223   SODIUM  142   POTASSIUM  5.3   CHLORIDE  105   CO2  34*   BUN  29*   CREATININE  0.78   CALCIUM  9.9     Recent Labs      19   0223   ALTSGPT  149*   ASTSGOT  112*   ALKPHOSPHAT  360*   TBILIRUBIN  9.1*   GLUCOSE  91     Recent Labs      19   0745   PROTHROMBTM  13.8   APTT  25.9   INR  1.04     Recent Labs      19   0223   ASTSGOT  112*   ALTSGPT  149*   ALKPHOSPHAT  360*   TBILIRUBIN  9.1*     Hemodynamics:  Temp (24hrs), Av.1 °C (98.7 °F), Min:36.4 °C (97.5 °F), Max:37.4 °C (99.4 °F)  Temperature: 36.4 °C (97.5 °F)  Pulse  Av.4  Min: 61  Max: 99Heart Rate (Monitored): 65  Blood Pressure : 123/50, NIBP: (!) 178/69     Respiratory:    Respiration: 17, Pulse Oximetry: 90 %        RUL Breath Sounds: Clear, RML Breath  Sounds: Clear, RLL Breath Sounds: Clear, LEVAR Breath Sounds: Clear, LLL Breath Sounds: Clear  Fluids:    Intake/Output Summary (Last 24 hours) at 07/07/19 0838  Last data filed at 07/07/19 0600   Gross per 24 hour   Intake                0 ml   Output              375 ml   Net             -375 ml        GI/Nutrition:  Orders Placed This Encounter   Procedures   • Diet Order Low Fiber(GI Soft), Hepatic     Standing Status:   Standing     Number of Occurrences:   1     Order Specific Question:   Diet:     Answer:   Low Fiber(GI Soft) [2]     Order Specific Question:   Diet:     Answer:   Hepatic [9]     Medical Decision Making, by Problem:  Active Hospital Problems    Diagnosis   • *Obstructive jaundice [K83.8]   • Chronic hepatitis B (HCC) [B18.1]   • Elevated LFTs [R94.5]   • Severe protein-calorie malnutrition (HCC) [E43]   • HTN (hypertension) [I10]   • Dyslipidemia [E78.5]       Plan:  1.  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 Plan for EUS this week, Dr Aponte on call, [facility] & have patient transferred there for procedure. Perhaps he can get a stent in as well with the drain now in place. Bilirubin decreased with drain placement. CA 19-9 elevated at 849.3.   2. Hepatitis B - +HBV DNA, e Ag negative.   3. Plan for EUS during hospitalization w/ FNA & outpatient follow up with Dr Andrews.     Quality-Core Measures

## 2019-07-07 NOTE — PROGRESS NOTES
Received report and assumed patient care. Patient is AOx4. Patient is complaining of pain near her drain insertion site. Patient was medicated this AM. Site is clean, dry and intact with a dressing in place. Assessment complete on RA. Drain in place with brown drainage. All needs met at this time. Safety precautions and hourly rounding in place.

## 2019-07-07 NOTE — CARE PLAN
Problem: Discharge Barriers/Planning  Goal: Patient's continuum of care needs will be met  Outcome: PROGRESSING AS EXPECTED  When medically clear, pt will be able to d/c home. Pt's sons at bedside and asking if pt will be d/c'ed with the drain. Explained POC r/t drain with pt and sons and explained we would know more after pt's AM labs result.     Problem: Pain Management  Goal: Pain level will decrease to patient's comfort goal  Outcome: PROGRESSING AS EXPECTED  Pt is hesitant to use pain medications but agrees that now is an appropriate time to use them as needed

## 2019-07-08 NOTE — PROGRESS NOTES
Patient refused use of a bed alarm (pt is moderate fall risk per HD), educated regarding importance of intervention, notified provider (NA), notified charge TINA Velarde.

## 2019-07-08 NOTE — PROGRESS NOTES
Patient's son Aaron (number is on file) would like to be updated when/if his mother is going to any procedures. Please call and keep him updated.

## 2019-07-08 NOTE — PROGRESS NOTES
Gastroenterology Progress Note     Author: Braxton Melton   Date & Time Created: 2019 1:41 PM    Chief Complaint:  Obstructive jaundice    Interval History:  Patient reports that she is feeling well today. She says that she would like to go home after all of her tests are completed. She is having constipation today    Review of Systems:  Review of Systems   Constitutional: Negative for chills and fever.   Respiratory: Negative for shortness of breath.    Cardiovascular: Negative for chest pain.   Gastrointestinal: Positive for constipation. Negative for abdominal pain, diarrhea, heartburn, nausea and vomiting.       Physical Exam:  Physical Exam   Constitutional: No distress.   Eyes: Scleral icterus is present.   Cardiovascular: Normal rate and regular rhythm.    Pulmonary/Chest: Effort normal and breath sounds normal. No respiratory distress.   Abdominal: Soft. Bowel sounds are normal. She exhibits no distension. There is no tenderness.   External biliary drain noted   Skin:   jaundice       Labs:          Recent Labs      19   0235   SODIUM  142  137   POTASSIUM  5.3  4.2   CHLORIDE  105  98   CO2  34*  32   BUN  29*  29*   CREATININE  0.78  0.58   CALCIUM  9.9  9.8     Recent Labs      19   0235   ALTSGPT  149*  144*   ASTSGOT  112*  92*   ALKPHOSPHAT  360*  341*   TBILIRUBIN  9.1*  7.2*   GLUCOSE  91  137*     Recent Labs      19   0745   PROTHROMBTM  13.8   APTT  25.9   INR  1.04     Recent Labs      19   0235   ASTSGOT  112*  92*   ALTSGPT  149*  144*   ALKPHOSPHAT  360*  341*   TBILIRUBIN  9.1*  7.2*     Hemodynamics:  Temp (24hrs), Av.8 °C (98.2 °F), Min:36.3 °C (97.3 °F), Max:37.5 °C (99.5 °F)  Temperature: 36.4 °C (97.5 °F)  Pulse  Av.6  Min: 61  Max: 99   Blood Pressure : 115/56     Respiratory:    Respiration: 15, Pulse Oximetry: 90 %        RUL Breath Sounds: Clear, RML Breath Sounds: Clear, RLL Breath Sounds:  Clear, LEVAR Breath Sounds: Clear, LLL Breath Sounds: Clear  Fluids:    Intake/Output Summary (Last 24 hours) at 07/08/19 1341  Last data filed at 07/08/19 0930   Gross per 24 hour   Intake              240 ml   Output              450 ml   Net             -210 ml        GI/Nutrition:  Orders Placed This Encounter   Procedures   • Diet Order Low Fiber(GI Soft), Hepatic     Standing Status:   Standing     Number of Occurrences:   1     Order Specific Question:   Diet:     Answer:   Low Fiber(GI Soft) [2]     Order Specific Question:   Diet:     Answer:   Hepatic [9]     Medical Decision Making, by Problem:  Active Hospital Problems    Diagnosis   • *Obstructive jaundice [K83.8]   • Chronic hepatitis B (HCC) [B18.1]   • Elevated LFTs [R94.5]   • Severe protein-calorie malnutrition (HCC) [E43]   • HTN (hypertension) [I10]   • Dyslipidemia [E78.5]       Plan:  85 yo female with obstructive jaundice and distal biliary stricture. Findings are concerning for pancreatic malignancy. Will plan for EUS/ERCP on Thursday (first available time slot) at [facility] at 2pm. Continue to treat constipation     Quality-Core Measures

## 2019-07-08 NOTE — PROGRESS NOTES
Around 2 AM, CNA's assisted pt to the bathroom. By the time pt returned to bed, movement would cause her to scream out in pain. 10 mg oxy PRN given. After administration, this RN decided to also give the pt 1mg morphine as to give the pt pain relief in the moment. Pt was able to rest comfortably and fall back asleep. When CNA took vitals, CNA alerted this RN that O2 sats were low (mid 70's). Upon assessment, pt's RR was 15 and RR were shallow. Pt put on 2 L 02 and is maintaining O2 sats >92%

## 2019-07-08 NOTE — DISCHARGE PLANNING
Received Transport Form at 1435  Spoke to Catarina at [facility].   Transport is scheduled for Wednesday July 10th at 0545 going to [facility] for an EVS test.

## 2019-07-08 NOTE — CARE PLAN
Problem: Bowel/Gastric:  Goal: Normal bowel function is maintained or improved  Outcome: PROGRESSING AS EXPECTED  Pt became suddenly nauseated when eating dinner. PRN zofran given by day shift, pt denies nausea into this shift. Pt promises to alert this RN is she becomes nauseated again.     Problem: Pain Management  Goal: Pain level will decrease to patient's comfort goal  Outcome: PROGRESSING AS EXPECTED  Pt still requires prompting to take PRN pain medication. Per pt report and as evidenced by non-verbal descriptors, it is obvious pt is in pain; yet pt still needs convincing to control pain.

## 2019-07-08 NOTE — CARE PLAN
Problem: Bowel/Gastric:  Goal: Normal bowel function is maintained or improved  Outcome: PROGRESSING SLOWER THAN EXPECTED  Patient has not had a bowel movement since 7/4. Senna was given to patient yesterday and patient did not have success having a bowel movement. Miralax was given to the patient this AM. Educated patient to call RN once she has a BM. Patient verbalized understanding.     Problem: Pain Management  Goal: Pain level will decrease to patient's comfort goal  Outcome: PROGRESSING AS EXPECTED  Educated patient to call RN for any pain. Patient verbalized understanding. Will reassess pain throughout shift.

## 2019-07-08 NOTE — PROGRESS NOTES
"Assumed care of pt from Isela Wayne. Pt has complaints of 4/10 LLQ and L flank pain at this time. Pt is also nauseated and vomiting. Pt states she became nauseated when she was eating and a bite of food didn't \"go down\" right. Pt then drank some water and began to vomit. Assured pt I will assess her nausea throughout the night.     Call light within reach, treaded slipper socks on, bed locked in lowest position. Mobility and fall risk assessed- proper communication signs are in place.   "

## 2019-07-08 NOTE — DIETARY
Nutrition Services Update: following for adequate nutritional intake    Day 6 of admit. Low fiber/hepatic diet. ADL documentation has been minimal, only 1 meal recorded over past 5 days, < 25% consumed.    Visited pt at bedside, pt appears to have sever fat and severe muscle loss, evidenced by protruding clavicle bones and sunken temporal lobes. Pt speaks some english, however had son at bedside help translate when she did not understand.     Pt states that her appetite is beginning to improve. Pt reports have trouble tolerating dinner last night, experienced emesis after trying spaghetti (? If red sauce was too acidic). However pt reports that she ate well at breakfast this morning, and has had no GI distress afterwards. Pt describes eating ~50% of breakfast tray.     Pt reports UBW = 122 - 124#, which is fairly consistent with current weight of 121#. No recent weight loss evidenced. Pt states that she typically drinks Ensure at home, at least 1 per day. Per original RD consult for supplements on 7/2 - will add supplement order and send BID in between meals.     Malnutrition Risk: Although nutrition focused physical exam evidences severe fat and severe muscle loss (protruding clavicles, sunken temporal lobes), diet and wt hx do not support malnutrition dx.     Plan/Recommend:   1. Boost Plus BID to optimize nutritional intake  2. Encourage PO intake of meals + supplements  3. Please record intake as % meals and supplements consumed in ADLs  4. Nutrition Representative to see daily for meal selection  5. RD to continue following

## 2019-07-08 NOTE — PROGRESS NOTES
Page out to Dr. Aponte regarding EUS with [facility] to inform him of [facility] availability of Thurs at 2 o'clock. Per MD reserve this time but ask to have a call back if anything sooner comes up.

## 2019-07-08 NOTE — PROGRESS NOTES
Dr Aponte called to inform procedure was moved to Wednesday 7/10 at 8 AM. Pt required to be there at 0630 AM. Notified Tamara Case coordinator

## 2019-07-08 NOTE — CARE PLAN
Problem: Nutritional:  Goal: Achieve adequate nutritional intake  Diet will advance beyond NPO/clear liquids and patient will consume ~50% of meals   Outcome: PROGRESSING AS EXPECTED

## 2019-07-09 NOTE — PROGRESS NOTES
Gastroenterology Progress Note     Author: Braxton Melton   Date & Time Created: 2019 10:32 AM    Chief Complaint:  Obstructive jaundice    Interval History:  Pt reports that she continues to have constipation. She reports no other new symptoms at this time    Review of Systems:  Review of Systems   Constitutional: Negative for chills and fever.   Respiratory: Negative for shortness of breath.    Cardiovascular: Negative for chest pain.   Gastrointestinal: Positive for constipation. Negative for abdominal pain, blood in stool, diarrhea, heartburn, melena, nausea and vomiting.       Physical Exam:  Physical Exam   Constitutional: No distress.   Eyes: Scleral icterus is present.   Cardiovascular: Normal rate and regular rhythm.    Pulmonary/Chest: Effort normal and breath sounds normal.   Abdominal: Soft. Bowel sounds are normal. She exhibits no distension. There is no tenderness.   Skin:   +jaundice       Labs:          Recent Labs      19   0247   SODIUM  142  137  137   POTASSIUM  5.3  4.2  4.0   CHLORIDE  105  98  98   CO2  34*  32  35*   BUN  29*  29*  24*   CREATININE  0.78  0.58  0.61   CALCIUM  9.9  9.8  9.7     Recent Labs      195  19   0247   ALTSGPT  149*  144*  122*   ASTSGOT  112*  92*  67*   ALKPHOSPHAT  360*  341*  294*   TBILIRUBIN  9.1*  7.2*  6.2*   GLUCOSE  91  137*  120*     No results for input(s): RBC, HEMOGLOBIN, HEMATOCRIT, PLATELETCT, PROTHROMBTM, APTT, INR, IRON, FERRITIN, TOTIRONBC in the last 72 hours.  Recent Labs      19   0247   ASTSGOT  112*  92*  67*   ALTSGPT  149*  144*  122*   ALKPHOSPHAT  360*  341*  294*   TBILIRUBIN  9.1*  7.2*  6.2*     Hemodynamics:  Temp (24hrs), Av.7 °C (98 °F), Min:36.3 °C (97.3 °F), Max:36.9 °C (98.5 °F)  Temperature: 36.8 °C (98.2 °F)  Pulse  Av.2  Min: 61  Max: 99   Blood Pressure : (!) 95/71 (rn notified)     Respiratory:     Respiration: 16, Pulse Oximetry: 90 %        RUL Breath Sounds: Clear, RML Breath Sounds: Clear, RLL Breath Sounds: Clear, LEVAR Breath Sounds: Clear, LLL Breath Sounds: Clear  Fluids:    Intake/Output Summary (Last 24 hours) at 07/09/19 1032  Last data filed at 07/09/19 1011   Gross per 24 hour   Intake              480 ml   Output              350 ml   Net              130 ml        GI/Nutrition:  Orders Placed This Encounter   Procedures   • Diet Order Low Fiber(GI Soft), Hepatic     Standing Status:   Standing     Number of Occurrences:   1     Order Specific Question:   Diet:     Answer:   Low Fiber(GI Soft) [2]     Order Specific Question:   Diet:     Answer:   Hepatic [9]   • Diet NPO     Standing Status:   Standing     Number of Occurrences:   8     Order Specific Question:   Restrict to:     Answer:   Strict [1]     Medical Decision Making, by Problem:  Active Hospital Problems    Diagnosis   • *Obstructive jaundice [K83.8]   • Chronic hepatitis B (HCC) [B18.1]   • Elevated LFTs [R94.5]   • Severe protein-calorie malnutrition (HCC) [E43]   • HTN (hypertension) [I10]   • Dyslipidemia [E78.5]       Plan:  1) obstructive jaundice -  Plan for EUS and biopsy tomorrow with removal of external drain and internalization with biliary stent via ERCP at [facility]. NPO at midnight  2) constipation - likely related to narcotic use. Will give magnesium citrate. Consider Relistor if still no BM    Quality-Core Measures

## 2019-07-09 NOTE — DISCHARGE SUMMARY
Discharge Summary    CHIEF COMPLAINT ON ADMISSION  Chief Complaint   Patient presents with   • Blood in Urine     x 4 days   • Abdominal Pain     patient c/o mid-lower abd pain x 3 days   • Nausea     x 4 days   • Diarrhea     x 4 days       Reason for Admission  blood in urine     Admission Date  7/2/2019    CODE STATUS  Full Code    HPI & HOSPITAL COURSE      84 y.o. female With hyperlipidemia, hypertension, admitted 7/2/2019 with right upper quadrant and epigastric abdominal pain with progressive jaundice over the last 4 days, along with dark urine and pale bowel movements.  Initial work-up showed no leukocytosis, with significant transaminitis with ALT of 283, AST of 237, total bilirubin of 14.5, and alkaline phosphatase of 505.  Lipase was normal.  Right upper quadrant ultrasound showed intrahepatic biliary dilation with mildly dilated CBD, with gallbladder sludge with borderline thickened gallbladder wall, with no pericholecystic fluid to suggest cholecystitis.  Viral hepatitis panel showed reactive hepatitis B surface antigen, with negative hepatitis B core antibody.   MRCP was ordered, and GI was consulted. MRCP showed intrahepatic and extrahepatic biliary dilation with abrupt narrowing of the common bile duct at the pancreatic head with findings concerning for pancreatic head mass, with distended gallbladder, with no stone demonstrated in the gallbladder or biliary tree.  She had an unsuccessful ERCP, with plan for repeat one by a different endoscopist. MRI of the abdomen showed several irregular cystic structures in the pancreatic head without any discrete infiltrative necrotic head mass identified, with moderately dilated main pancreatic duct with dilated side branches. CA 19-9 is high at 849.3. Surgery was consulted. She had another attempt at ERCP, but after numerous attempts was unable to pass the ampulla as the guidewire kept hitting hard tissue and was unable to pass.  GI recommended IR percutaneous  biliary drainage. HBV was detected with quantitative testing by NAAS, 153 IU/mL. HBE Ag was negative. She had a percutaneous transhepatic cholangiogram, with placement of percutaneous drainage.  Biliary fluid Gram stain showed no organisms. Surgery recommended no urgent surgical intervention as needed, and to follow-up with outpatient hepatobiliary surgery.  GI planning to do EUS during this hospitalization. Patient will be transferred to another facility for EUS.     Therefore, [facility]    The patient met 2-midnight criteria for an inpatient stay at the time of discharge.    Discharge Date  7/10/2019    FOLLOW UP ITEMS POST DISCHARGE  none    DISCHARGE DIAGNOSES  Principal Problem:    Obstructive jaundice POA: Yes  Active Problems:    Elevated LFTs POA: Yes    Chronic hepatitis B (HCC) POA: Yes    Dyslipidemia POA: Yes    HTN (hypertension) POA: Yes    Severe protein-calorie malnutrition (HCC) POA: Yes  Resolved Problems:    * No resolved hospital problems. *      FOLLOW UP  No future appointments.  Monica Rodriguez M.D.  [address] [place] [id number] [phone]    Schedule an appointment as soon as possible for a visit in 1 week  Hospital follow-up appointment with PCP      MEDICATIONS ON DISCHARGE     Medication List      ASK your doctor about these medications      Instructions   lisinopril 5 MG Tabs  Commonly known as:  PRINIVIL   Doctor's comments:  Insurance will pay for 100 day supply.. Please remind pt to get labs done  Take 1 Tab by mouth every day.  Dose:  5 mg            Allergies  Allergies   Allergen Reactions   • Iodine Anaphylaxis and Swelling   • Aspirin    • Clindamycin    • Lactose      Stomach upset    • Penicillins    • Robitussin [Guiatuss] Diarrhea   • Shellfish Allergy    • Tetanus Toxoid    • Zithromax [Azithromycin Dihydrate]        DIET  Orders Placed This Encounter    Procedures   • Diet Order Low Fiber(GI Soft), Hepatic     Standing Status:   Standing     Number of Occurrences:   1     Order Specific Question:   Diet:     Answer:   Low Fiber(GI Soft) [2]     Order Specific Question:   Diet:     Answer:   Hepatic [9]   • Diet NPO     Standing Status:   Standing     Number of Occurrences:   8     Order Specific Question:   Restrict to:     Answer:   Strict [1]       ACTIVITY  As tolerated.  Weight bearing as tolerated    CONSULTATIONS  GI    PROCEDURES  ERCP    DX-PORTABLE FLUOROSCOPY < 1 HOUR   Final Result         Portable fluoroscopy utilized for 3 seconds.      IR-PERQ BILIARY CATH PLACEMT INT-EXT  (INCLUDES ALL RADIOLOGY)   Final Result      1. Ultrasound and fluoroscopic guided percutaneous transhepatic cholangiogram demonstrating distal common bile duct occlusion concordant with ERCP findings. Moderate dilatation of intrahepatic and extrahepatic biliary ducts.      2. Percutaneous biliary drainage with placement of an 8 [employer] locking loop internal-external biliary stent-catheter. The catheter is connected to gravity drainage at this time. When hyperbilirubinemia resolves, the catheter can be capped to    allow antegrade physiologic bile drainage. The distal common duct obstruction may be amenable to metallic self-expanding internal stent placement by antegrade technique or rendezvous endoscopic technique.         MR-ABDOMEN-WITH & W/O   Final Result         1.  Intrahepatic and extrahepatic ductal dilatation, consistent with known biliary obstruction. Gallbladder distention and sludge is likely related to obstruction.      2.  Several irregular cystic structures in the pancreatic head without a discrete infiltrative pancreatic head mass identified. Moderately dilated main pancreatic duct with dilated side branches. No definite vascular encasement identified. If clinically    indicated, pancreatic protocol CT may be helpful for further evaluation.      3.   Atherosclerosis.      4.  Bilateral renal cysts.               DX-PORTABLE FLUORO > 1 HOUR   Final Result      Fluoroscopic image(s) obtained during ERCP. Please see the patient's chart for full procedural details.      Fluoroscopy time 2.6 minutes.         [id number]   Final Result      1.  Intrahepatic and extrahepatic biliary dilation with abrupt narrowing of the common bile duct at the pancreatic head with findings concerning for pancreatic head mass.   2.  Distended gallbladder.   3.  No stone demonstrated in the gallbladder or biliary tree.   4.  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      US-RUQ   Final Result      1.  Intrahepatic biliary dilatation with mildly dilated CBD. This may be due to CBD stone, stricture or mass.   2.  Gallbladder sludge with borderline thickened gallbladder wall. No gallstones identified. No pericholecystic fluid to suggest cholecystitis.   3.  Simple right renal cyst.      DX-CHEST-PORTABLE (1 VIEW)   Final Result      No acute cardiopulmonary process is seen.      Atherosclerotic plaque.        PE:  Gen: AAOx3, NAD  Eyes: PELLA  Neck: no JVD, no lymphadenopathy  Cardia: RRR, no mrg  Lungs: CTAB, no rales, rhonci or wheezing  Abd: NABS, soft, non extended, no mass  EXT: No C/C/E, peripheral pulse 2+ b/l  Neuro: CN II-XII intact, non focal, reflex 2+ symmetrical  Skin: Intact, no lesion, warm  Psych: Appropriate.      LABORATORY  Lab Results   Component Value Date    SODIUM 137 07/09/2019    POTASSIUM 4.0 07/09/2019    CHLORIDE 98 07/09/2019    CO2 35 (H) 07/09/2019    GLUCOSE 120 (H) 07/09/2019    BUN 24 (H) 07/09/2019    CREATININE 0.61 07/09/2019    CREATININE 0.71 01/19/2011    GLOMRATE >59 01/19/2011        Lab Results   Component Value Date    WBC 5.7 07/03/2019    HEMOGLOBIN 12.7 07/03/2019    HEMATOCRIT 37.1 07/03/2019    PLATELETCT 189 07/03/2019        Total time of the discharge process exceeds 38 minutes.

## 2019-07-09 NOTE — PROGRESS NOTES
Hospital Medicine Daily Progress Note    Date of Service  7/9/2019    Chief Complaint  Abdominal pain, jaundice    Hospital Course    84 y.o. female With hyperlipidemia, hypertension, admitted 7/2/2019 with right upper quadrant and epigastric abdominal pain with progressive jaundice over the last 4 days, along with dark urine and pale bowel movements.  Initial work-up showed no leukocytosis, with significant transaminitis with ALT of 283, AST of 237, total bilirubin of 14.5, and alkaline phosphatase of 505.  Lipase was normal.  Right upper quadrant ultrasound showed intrahepatic biliary dilation with mildly dilated CBD, with gallbladder sludge with borderline thickened gallbladder wall, with no pericholecystic fluid to suggest cholecystitis.  Viral hepatitis panel showed reactive hepatitis B surface antigen, with negative hepatitis B core antibody.   MRCP was ordered, and GI was consulted. MRCP showed intrahepatic and extrahepatic biliary dilation with abrupt narrowing of the common bile duct at the pancreatic head with findings concerning for pancreatic head mass, with distended gallbladder, with no stone demonstrated in the gallbladder or biliary tree.  She had an unsuccessful ERCP, with plan for repeat one by a different endoscopist. MRI of the abdomen showed several irregular cystic structures in the pancreatic head without any discrete infiltrative necrotic head mass identified, with moderately dilated main pancreatic duct with dilated side branches. CA 19-9 is high at 849.3. Surgery was consulted. She had another attempt at ERCP, but after numerous attempts was unable to pass the ampulla as the guidewire kept hitting hard tissue and was unable to pass.  GI recommended IR percutaneous biliary drainage. HBV was detected with quantitative testing by NAAS, 153 IU/mL. HBE Ag was negative. She had a percutaneous transhepatic cholangiogram, with placement of percutaneous drainage.  Biliary fluid Gram stain showed no  organisms. Surgery recommended no urgent surgical intervention as needed, and to follow-up with outpatient hepatobiliary surgery.  GI planning to do EUS during this hospitalization.             Interval Problem Update  7/8/2019 - I reviewed the patient's chart. There were no significant overnight events. Remains hemodynamically stable and afebrile. Stable on 3L O2 NC.  LFTs continue to slowly trend down.  Biliary Gram stain showed few gram-positive rods, and few yeast, cultures pending.    7/9. Patient still complains of abdominal pain but better than yesterday. Patient's pain is local 3-4/10, intermittent and does not radiate to other location, sharp and with some tingling. Can be controlled by pain meds. Dressing in place.      Consultants/Specialty  GI  IR    Code Status  Full    Disposition  Monitor on the floors    Review of Systems  Review of Systems   Constitutional: Negative for chills, fever and weight loss.   HENT: Negative for congestion, ear discharge, ear pain, hearing loss and nosebleeds.    Eyes: Negative for blurred vision and pain.   Respiratory: Negative for cough, sputum production, shortness of breath and wheezing.    Cardiovascular: Negative for chest pain, palpitations, leg swelling and PND.   Gastrointestinal: Positive for abdominal pain. Negative for constipation, diarrhea, heartburn, nausea and vomiting.   Genitourinary: Negative for dysuria, frequency and hematuria.   Musculoskeletal: Negative for back pain, falls, joint pain and neck pain.   Skin: Negative for rash.   Neurological: Negative for dizziness, tremors, speech change, seizures, weakness and headaches.   Psychiatric/Behavioral: Negative for depression, substance abuse and suicidal ideas.        Pertinent positives/negatives as mentioned above.     A complete review of systems was personally done by me. All other systems were negative.       Physical Exam  Temp:  [36.3 °C (97.3 °F)-36.9 °C (98.5 °F)] 36.8 °C (98.2 °F)  Pulse:   [65-88] 65  Resp:  [16-18] 16  BP: ()/(44-71) 95/71  SpO2:  [90 %-99 %] 90 %    Physical Exam   Constitutional: She is oriented to person, place, and time. She appears well-developed and well-nourished.   Cachectic. Body mass index is 20.14 kg/m².   HENT:   Head: Normocephalic and atraumatic.   Mouth/Throat: No oropharyngeal exudate.   Eyes: Pupils are equal, round, and reactive to light. Conjunctivae are normal.   Neck: Normal range of motion. Neck supple. No JVD present. No tracheal deviation present.   Cardiovascular: Normal rate and regular rhythm.  Exam reveals no gallop and no friction rub.    No murmur heard.  Pulmonary/Chest: Effort normal and breath sounds normal. No respiratory distress. She has no rales. She exhibits no tenderness.   Abdominal: Soft. Bowel sounds are normal. She exhibits no distension. There is tenderness (Minimal tenderness in the right upper quadrant.  No peritoneal signs.). There is no guarding.   Percutaneous biliary drain in place, draining dark-colored bilious fluid.  Minimal tenderness on the right upper quadrant.   Musculoskeletal: Normal range of motion. She exhibits no edema or tenderness.   Lymphadenopathy:     She has no cervical adenopathy.   Neurological: She is alert and oriented to person, place, and time. No cranial nerve deficit.   Skin: Skin is warm and dry. No rash noted. She is not diaphoretic. No erythema. No pallor.   Significantly jaundiced skin   Psychiatric: She has a normal mood and affect. Her behavior is normal. Judgment and thought content normal.   Nursing note and vitals reviewed.       I have performed the physical examination today 7/8/2019.  In review of yesterday's note, there are no new changes except as documented above.        Fluids    Intake/Output Summary (Last 24 hours) at 07/09/19 1308  Last data filed at 07/09/19 1011   Gross per 24 hour   Intake              480 ml   Output              150 ml   Net              330 ml       Laboratory       Recent Labs      07/07/19   0223  07/08/19   0235  07/09/19   0247   SODIUM  142  137  137   POTASSIUM  5.3  4.2  4.0   CHLORIDE  105  98  98   CO2  34*  32  35*   GLUCOSE  91  137*  120*   BUN  29*  29*  24*   CREATININE  0.78  0.58  0.61   CALCIUM  9.9  9.8  9.7                   Imaging  DX-PORTABLE FLUOROSCOPY < 1 HOUR   Final Result         Portable fluoroscopy utilized for 3 seconds.      IR-PERQ BILIARY CATH PLACEMT INT-EXT  (INCLUDES ALL RADIOLOGY)   Final Result      1. Ultrasound and fluoroscopic guided percutaneous transhepatic cholangiogram demonstrating distal common bile duct occlusion concordant with ERCP findings. Moderate dilatation of intrahepatic and extrahepatic biliary ducts.      2. Percutaneous biliary drainage with placement of an 8 [facility] locking loop internal-external biliary stent-catheter. The catheter is connected to gravity drainage at this time. When hyperbilirubinemia resolves, the catheter can be capped to    allow antegrade physiologic bile drainage. The distal common duct obstruction may be amenable to metallic self-expanding internal stent placement by antegrade technique or rendezvous endoscopic technique.         MR-ABDOMEN-WITH & W/O   Final Result         1.  Intrahepatic and extrahepatic ductal dilatation, consistent with known biliary obstruction. Gallbladder distention and sludge is likely related to obstruction.      2.  Several irregular cystic structures in the pancreatic head without a discrete infiltrative pancreatic head mass identified. Moderately dilated main pancreatic duct with dilated side branches. No definite vascular encasement identified. If clinically    indicated, pancreatic protocol CT may be helpful for further evaluation.      3.  Atherosclerosis.      4.  Bilateral renal cysts.               DX-PORTABLE FLUORO > 1 HOUR   Final Result      Fluoroscopic image(s) obtained during ERCP. Please see the patient's chart for full procedural details.       Fluoroscopy time 2.6 minutes.         [id number]   Final Result      1.  Intrahepatic and extrahepatic biliary dilation with abrupt narrowing of the common bile duct at the pancreatic head with findings concerning for pancreatic head mass.   2.  Distended gallbladder.   3.  No stone demonstrated in the gallbladder or biliary tree.   4.  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      US-RUQ   Final Result      1.  Intrahepatic biliary dilatation with mildly dilated CBD. This may be due to CBD stone, stricture or mass.   2.  Gallbladder sludge with borderline thickened gallbladder wall. No gallstones identified. No pericholecystic fluid to suggest cholecystitis.   3.  Simple right renal cyst.      DX-CHEST-PORTABLE (1 VIEW)   Final Result      No acute cardiopulmonary process is seen.      Atherosclerotic plaque.           Assessment/Plan  * Obstructive jaundice- (present on admission)   Assessment & Plan    - with right upper quadrant pain and jaundice.  Ultrasound showed mildly dilated common bile duct and intrahepatic biliary dilation.  MRCP showed intrahepatic and extrahepatic biliary dilation with abrupt narrowing of the common bile duct at the pancreatic head with findings concerning for pancreatic head mass, with distended gallbladder, with no stone demonstrated in the gallbladder or biliary tree. No evidence of acute cholecystitis on ultrasound. No signs of cholangitis. 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  No discrete pancreatic mass seen on MRI.  -failed attempts at ERCP x2. Now has percutaneous biliary drainage.   -GI planning for EUS during this hospitalization.  Awaiting arrangements to transfer to [facility] for EUS.   -Will need outpatient referral/follow-up with hepatobiliary surgery (Dr. Pat).  -continue to trend LFTs.  -Continue symptom control with IV morphine PRN, and PRN oxycodone. Continue antiemetics.  -I consulted ID for antibiotics  choice.  Patient will be transferred to [facility] tomorrow for EUS.     Chronic hepatitis B (HCC)- (present on admission)   Assessment & Plan    -GI following, considering starting treatment.      Elevated LFTs- (present on admission)   Assessment & Plan    - Secondary to biliary obstruction of unknown etiology. Further work-up as above.  Patient will be transferred to [facility] tomorrow for EUS.     Severe protein-calorie malnutrition (HCC)- (present on admission)   Assessment & Plan    - RD consult.      HTN (hypertension)- (present on admission)   Assessment & Plan    -Continue home dose lisinopril.  Monitor blood pressure trend closely, and adjust antihypertensives if blood pressure remains elevated.     Dyslipidemia- (present on admission)   Assessment & Plan    - Not on home medications. Repeat lipid profile -  HDL 51, and LDL of 116, with total cholesterol 174.   - Reasonable to hold off on statin for now given transaminitis.          VTE prophylaxis: SCD      Current Facility-Administered Medications:   •  oxyCODONE immediate-release (ROXICODONE) tablet 5-10 mg, 5-10 mg, Oral, Q4HRS PRN, Gregg Bravo M.D., 10 mg at 07/09/19 0247  •  morphine (pf) 4 mg/ml injection 1-2 mg, 1-2 mg, Intravenous, Q3HRS PRN, Gregg Bravo M.D., 1 mg at 07/08/19 0213  •  enalaprilat (VASOTEC) injection 1.25 mg, 1.25 mg, Intravenous, Q6HRS PRN, Gregg Bravo M.D.  •  hydrALAZINE (APRESOLINE) injection 10 mg, 10 mg, Intravenous, Q4HRS PRN, Grgeg Bravo M.D., 10 mg at 07/03/19 1754  •  lisinopril (PRINIVIL) 10 MG tablet 5 mg, 5 mg, Oral, DAILY, Shasta Molina M.D., 5 mg at 07/09/19 0520  •  senna-docusate (PERICOLACE or SENOKOT S) 8.6-50 MG per tablet 2 Tab, 2 Tab, Oral, BID, 2 Tab at 07/09/19 0525 **AND** polyethylene glycol/lytes (MIRALAX) PACKET 1 Packet, 1 Packet, Oral, QDAY PRN, 1 Packet at 07/08/19 0933 **AND** magnesium hydroxide (MILK OF MAGNESIA) suspension 30 mL, 30 mL, Oral, QDAY PRN, 30 mL at 07/09/19  0856 **AND** bisacodyl (DULCOLAX) suppository 10 mg, 10 mg, Rectal, QDAY PRN, Shasta Molina M.D.  •  ondansetron (ZOFRAN) syringe/vial injection 4 mg, 4 mg, Intravenous, Q4HRS PRN, Shasta Molina M.D., 4 mg at 07/07/19 1901  •  ondansetron (ZOFRAN ODT) dispertab 4 mg, 4 mg, Oral, Q4HRS PRN, Shasta Molina M.D.

## 2019-07-09 NOTE — CARE PLAN
Problem: Bowel/Gastric:  Goal: Normal bowel function is maintained or improved  Outcome: PROGRESSING SLOWER THAN EXPECTED  Per pt has not had a BM since 7/4/2019. Milk of Magnesia was administered this AM. Educated pt to let RN know when she has a BM. Pt verbalized understanding.     Problem: Pain Management  Goal: Pain level will decrease to patient's comfort goal  Outcome: PROGRESSING AS EXPECTED  Pt requesting to have pain medication after breakfast. Pain reading a 5/10 using 0-10 pain scale located in RUQ. Educated pt to let RN know when she is in pain.

## 2019-07-09 NOTE — CONSULTS
Consults   INFECTIOUS DISEASES INPATIENT CONSULT NOTE     Date of Service: 7/9/2019    Consult Requested By: Earlene Bess M.D.    Reason for Consultation: Bile fluid with yeast and strep viridens     History of Present Illness:   Katelyn Hill is a 84 y.o.  admitted 7/2/2019. Pt has a past medical history  hyperlipidemia and hypertension who was admitted due to right upper quadrant and epigastric pain as well as jaundice along with dark urine and pale bowel movements.  Due to this she presented to the ED.     Hospital Course:   She has been afebrile.  No leukocytosis.  Labs on admission with elevated AST-ALT, significantly elevated alk phos and total bilirubin.  Right upper quadrant ultrasound with intrahepatic biliary dilation and mildly dilated common bile duct, gallbladder sludge but no pericholecystic fluid to suggest cholecystitis she was diagnosed with obstructive jaundice.  GI was consulted and an MRCP was performed.  This was concerning for pancreatic head mass as well as extrahepatic biliary dilation with abrupt narrowing of the common bile duct at the pancreatic head.  No gallstones were noted.  She is had 2 ERCPs but they have been unsuccessful. She was taken to the OR on 7/3 for a percutaneous transplant cholangiogram, biliary stent was placed and bile cultures were obtained.  Viral hepatitis panel is positive for hepatitis B surface antigen and negative hepatitis B core antibody. She is had steady improvement in AST and ALT as well as alk phos and total bilirubin.  Plan is to go back to the OR for ERCP on 7/10.     Review Of Systems:  Review of Systems   Constitutional: Positive for malaise/fatigue. Negative for chills, fever and weight loss.   HENT: Negative for hearing loss.    Eyes: Negative for blurred vision.   Respiratory: Negative for cough, sputum production and shortness of breath.    Cardiovascular: Negative for chest pain.   Gastrointestinal: Positive for abdominal pain and  constipation. Negative for diarrhea, nausea and vomiting.   Genitourinary: Negative for dysuria.   Musculoskeletal: Negative for myalgias.   Skin: Negative for rash.   Neurological: Negative for headaches.   Psychiatric/Behavioral: The patient is not nervous/anxious.          PMH:   Past Medical History:   Diagnosis Date   • Arthritis    • Cataract    • Dyslipidemia    • Hypertension    • Kidney calculi    • Lactose intolerance    • Lung nodules     ashley Correa, recommend yearly chest xray   • MEDICAL HOME    • OA (osteoarthritis)     right knee, shoulder   • Raynaud's disease /phenomenon    • Torn rotator cuff 2008    left, Dr. Montalvo       PSH:  Past Surgical History:   Procedure Laterality Date   • ERCP IN OR N/A 7/5/2019    Procedure: ERCP (ENDOSCOPIC RETROGRADE CHOLANGIOPANCREATOGRAPHY);  Surgeon: Sean Nguyen D.O.;  Location: SURGERY [facility];  Service: Gastroenterology   • ERCP IN OR N/A 7/3/2019    Procedure: ERCP (ENDOSCOPIC RETROGRADE CHOLANGIOPANCREATOGRAPHY);  Surgeon: Melissa Andrews M.D.;  Location: SURGERY [facility];  Service: Gastroenterology   • APPENDECTOMY  1943   • KNEE ARTHROSCOPY      right   • SHOULDER ARTHROSCOPY      right   • TONSILLECTOMY         FAMILY HX:  Family History   Problem Relation Age of Onset   • Heart Disease Mother    • Heart Disease Father         enlarged heart   • Diabetes Son    • Cancer Maternal Grandmother         suicide    • Cancer Maternal Grandfather    • Cancer Paternal Grandmother         Lung cancer        SOCIAL HX:  Social History     Social History   • Marital status:      Spouse name: N/A   • Number of children: 2S   • Years of education: N/A     Occupational History   • Retired -  Retired     Social History Main Topics   • Smoking status: Former Smoker     Packs/day: 1.00     Years: 30.00     Types: Cigarettes     Quit date: 1/1/1985   • Smokeless tobacco: Never Used      Comment: 1 ppd for 30 years, [id number]   •  Alcohol use No   • Drug use: No   • Sexual activity: No     Other Topics Concern   • Not on file     Social History Narrative   • No narrative on file     History   Smoking Status   • Former Smoker   • Packs/day: 1.00   • Years: 30.00   • Types: Cigarettes   • Quit date: 1/1/1985   Smokeless Tobacco   • Never Used     Comment: 1 ppd for 30 years, quite1985     History   Alcohol Use No       Allergies/Intolerances:  Allergies   Allergen Reactions   • Iodine Anaphylaxis and Swelling   • Aspirin    • Clindamycin    • Lactose      Stomach upset    • Penicillins    • Robitussin [Guiatuss] Diarrhea   • Shellfish Allergy    • Tetanus Toxoid    • Zithromax [Azithromycin Dihydrate]        History reviewed with the patient     Other Current Medications:    Current Facility-Administered Medications:   •  oxyCODONE immediate-release (ROXICODONE) tablet 5-10 mg, 5-10 mg, Oral, Q4HRS PRN, Gregg Bravo M.D., 10 mg at 07/09/19 0247  •  morphine (pf) 4 mg/ml injection 1-2 mg, 1-2 mg, Intravenous, Q3HRS PRN, Gregg Bravo M.D., 1 mg at 07/08/19 0213  •  enalaprilat (VASOTEC) injection 1.25 mg, 1.25 mg, Intravenous, Q6HRS PRN, Gregg Bravo M.D.  •  hydrALAZINE (APRESOLINE) injection 10 mg, 10 mg, Intravenous, Q4HRS PRN, Gregg Bravo M.D., 10 mg at 07/03/19 1754  •  lisinopril (PRINIVIL) 10 MG tablet 5 mg, 5 mg, Oral, DAILY, Shasta Molina M.D., 5 mg at 07/09/19 0520  •  senna-docusate (PERICOLACE or SENOKOT S) 8.6-50 MG per tablet 2 Tab, 2 Tab, Oral, BID, 2 Tab at 07/09/19 0525 **AND** polyethylene glycol/lytes (MIRALAX) PACKET 1 Packet, 1 Packet, Oral, QDAY PRN, 1 Packet at 07/08/19 0933 **AND** magnesium hydroxide (MILK OF MAGNESIA) suspension 30 mL, 30 mL, Oral, QDAY PRN, 30 mL at 07/09/19 0856 **AND** bisacodyl (DULCOLAX) suppository 10 mg, 10 mg, Rectal, QDAY PRN, Shasta Molina M.D.  •  ondansetron (ZOFRAN) syringe/vial injection 4 mg, 4 mg, Intravenous, Q4HRS PRN, Shasta Molina M.D., 4 mg at 07/07/19  "  •  ondansetron (ZOFRAN ODT) dispertab 4 mg, 4 mg, Oral, Q4HRS PRN, Shasta Molina M.D.  [unfilled]    Most Recent Vital Signs:  /66   Pulse 71   Temp 36.3 °C (97.4 °F) (Temporal)   Resp 18   Ht 1.651 m (5' 5\")   Wt 54.9 kg (121 lb 0.5 oz)   LMP 1985   SpO2 98%   Breastfeeding? No   BMI 20.14 kg/m²   Temp  Av.8 °C (98.3 °F)  Min: 36.1 °C (97 °F)  Max: 37.6 °C (99.7 °F)    Physical Exam:  Physical Exam   Constitutional: She is oriented to person, place, and time and well-developed, well-nourished, and in no distress.   HENT:   Head: Normocephalic and atraumatic.   Eyes: Scleral icterus is present.   Cardiovascular: Normal rate, regular rhythm and normal heart sounds.    Pulmonary/Chest:   Poor inspiratory effort, diminished breath sounds.   Abdominal: Soft. Bowel sounds are normal. She exhibits no distension. There is tenderness. There is no rebound and no guarding.   Epigastric tenderness to palpation, biliary drain in place with dark brown fluid   Musculoskeletal: She exhibits no edema.   Neurological: She is alert and oriented to person, place, and time.   Skin: Skin is warm and dry.   Psychiatric: Affect and judgment normal.         Pertinent Lab Results:  No results for input(s): WBC, HGB in the last 72 hours.    Invalid input(s): PLATELET, ABSOLUTENEUT           Recent Labs      19   0235  19   0247   SODIUM  142  137  137   POTASSIUM  5.3  4.2  4.0   CHLORIDE  105  98  98   CO2  34*  32  35*   CREATININE  0.78  0.58  0.61        Recent Labs      19   0235  19   0247   ALBUMIN  3.5  3.6  3.5        Pertinent Micro:  Results     Procedure Component Value Units Date/Time    FLUID CULTURE W/GRAM STAIN [[id number]]  (Abnormal) Collected:  19 0600    Order Status:  Completed Specimen:  Body Fluid from Other Body Fluid Updated:  19 1035     Significant Indicator POS (POS)     Source BF     Site Bile Fluid "     Culture Result - (A)     Gram Stain Result Few Gram positive rods.  Few Yeast.       Culture Result Yeast  Moderate growth  Further incubation required   (A)      Viridans Streptococcus  Rare growth   (A)    Narrative:       Collected By:[id number] FRENCH WANGILY ARISTIDES  Biliary Fluid from PTC.  C & S, Gram Stain.  Hx: possible obstruction, likely pancreatic tumor.  Collected By:[id number] FRENCH IRVING    FLUID CULTURE W/GRAM STAIN [[id number]] Collected:  07/06/19 1212    Order Status:  Completed Specimen:  Body Fluid Updated:  07/09/19 1019     Significant Indicator NEG     Source BF     Site Biliary Fluid     Culture Result No growth at 72 hours.     Gram Stain Result No organisms seen.    GRAM STAIN [[id number]] Collected:  07/07/19 0600    Order Status:  Completed Specimen:  Body Fluid Updated:  07/07/19 2254     Significant Indicator .     Source BF     Site Bile Fluid     Gram Stain Result Few Gram positive rods.  Few Yeast.      Narrative:       Collected By:[id number] FRENCH SHELDON ARISTIDES  Biliary Fluid from PTC.  C & S, Gram Stain.  Hx: possible obstruction, likely pancreatic tumor.  Collected By:[id number] FRENCH CHUNG ARISTIDES    GRAM STAIN [[id number]] Collected:  07/06/19 1212    Order Status:  Completed Specimen:  Body Fluid Updated:  07/06/19 1704     Significant Indicator .     Source BF     Site Biliary Fluid     Gram Stain Result No organisms seen.        Blood Culture   Date Value Ref Range Status   05/06/2009 No growth after 5 days of incubation.  Final        Studies:  Dx-chest-portable (1 View)    Result Date: 7/2/2019 7/2/2019 2:34 PM HISTORY/REASON FOR EXAM:  jaundice. TECHNIQUE/EXAM DESCRIPTION AND NUMBER OF VIEWS: Single portable view of the chest. COMPARISON: 5/8/2019 FINDINGS: The heart is not enlarged. Atherosclerotic calcification is seen.  No focal consolidation, pleural effusion or pneumothorax is identified.  Costophrenic angles are clear. There is a calcified granuloma in the liver.     No acute  cardiopulmonary process is seen. Atherosclerotic plaque.    [id number]-m/o    Result Date: 7/3/2019  7/3/2019 10:23 AM HISTORY/REASON FOR EXAM:  Cholelithiasis. Jaundice, abdominal pain. TECHNIQUE/EXAM DESCRIPTION: Magnetic resonance cholangiopancreatography. Magnetic resonance cholangiopancreatography was performed on with axial, coronal, and sagittal thin and thick section heavily T2-weighted sequences. 3-D cholangiographic multiplanar maximum intensity projection (MIP) images were created on a workstation.. The study was performed on a Ginny 1.5 Bever[employer] MRI scanner. COMPARISON: Rubber quadrant ultrasound 7/3/2019 FINDINGS: Liver shows intrahepatic biliary dilation. Common bile duct measures 10 mm in diameter.  Abrupt narrowing at the pancreatic head. T1 hypointensity in the pancreatic head concerning for mass. Configuration of pancreatic duct suggests pancreas divisum. Gallbladder is distended. No signal void to indicate stone. T2 hyperintense lesions in both kidneys likely indicating cysts. Adrenal glands are unremarkable.     1.  Intrahepatic and extrahepatic biliary dilation with abrupt narrowing of the common bile duct at the pancreatic head with findings concerning for pancreatic head mass. 2.  Distended gallbladder. 3.  No stone demonstrated in the gallbladder or biliary tree. 4.  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    Mr-abdomen-with & W/o    Result Date: 7/5/2019 7/4/2019 1:38 PM HISTORY/REASON FOR EXAM:  Biliary obstruction. TECHNIQUE/EXAM DESCRIPTION: MRI of the pancreas with dynamic IV gadolinium enhancement. MR imaging of the pancreas was performed.  MR images of the pancreas were obtained with coronal and axial single-shot fast spin-echo T2, fat-suppressed axial FRFSE T2, axial in-phase and out-of-phase FSPGR T1, axial DWI with a b-value of 600, 3D MRCP,  precontrast fat-suppressed FSPGR T1, dynamic gadolinium enhanced axial fat-suppressed T1 FSPGR in the arterial dominant, portal venous, 2-minute and  4-minute delayed phases, with delayed coronal fat-suppressed T1 FSPGR sequence. . The study was performed on a i[employer] 1.5 Beverley MRI scanner. 5 mL Gadavist contrast was administered intravenously. COMPARISON: MRCP 7/3/2019 and ultrasound 7/2/2019 FINDINGS: Liver: Homogeneous enhancement. A calcification is in the medial left hepatic lobe as seen on prior CT. No solid mass is identified. Gallbladder and biliary system: There is moderate dilatation of the intrahepatic ducts. The common bile duct is dilated to approximately 8 mm. The gallbladder is distended and contains layering sludge. There is a low posterior insertion of the cystic duct. Pancreas: The pancreas is somewhat atrophic. The pancreatic duct in the mid to distal body measures approximately 3.5 mm. There are dilated side branches. The minor pancreatic duct is patent without evidence of obstruction. There are multiple irregular cystic structures are in the pancreatic head measuring less than a centimeter. No discrete infiltrative solid mass is appreciated. Vasculature: The hepatic, portal, superior mesenteric, and splenic veins are patent. Origins of the celiac and superior mesenteric veins are patent. No definite vascular encasement is appreciated. No vessel attenuation. There is ectasia of the aorta with  atherosclerotic plaque. Spleen: Normal. Kidneys: The kidneys enhance symmetrically. There are bilateral renal cysts. No hydronephrosis is identified. Adrenal glands: Unremarkable. Ascites: No ascites. Lymph nodes: No adenopathy.     1.  Intrahepatic and extrahepatic ductal dilatation, consistent with known biliary obstruction. Gallbladder distention and sludge is likely related to obstruction. 2.  Several irregular cystic structures in the pancreatic head without a discrete infiltrative pancreatic head mass identified. Moderately dilated main pancreatic duct with dilated side branches. No definite vascular encasement identified. If clinically  indicated, pancreatic protocol CT may be helpful for further evaluation. 3.  Atherosclerosis. 4.  Bilateral renal cysts.     Us-ruq    Result Date: 7/2/2019 7/2/2019 2:39 PM HISTORY/REASON FOR EXAM:  Jaundice TECHNIQUE/EXAM DESCRIPTION AND NUMBER OF VIEWS:  Real-time sonography of the liver and biliary tree. COMPARISON: CT dated 11/2/2017 FINDINGS: The liver measures 13.32 cm. The liver is normal in contour. There is no evidence of solid mass lesion. Calcification in the left hepatic lobe. Intrahepatic biliary dilation is present. The gallbladder is normal in size. There is no evidence of cholelithiasis. Gallbladder sludge. The gallbladder wall thickness measures 0.31 cm. There is no pericholecystic fluid. The common duct measures 0.93 cm. The visualized pancreas is unremarkable. The visualized aorta is normal in caliber. Atherosclerosis. Intrahepatic IVC is patent. The portal vein is patent with hepatopetal flow. The MPV measures 0.9 cm. The right kidney measures 10.40 cm. Simple right renal cyst. There is no ascites.     1.  Intrahepatic biliary dilatation with mildly dilated CBD. This may be due to CBD stone, stricture or mass. 2.  Gallbladder sludge with borderline thickened gallbladder wall. No gallstones identified. No pericholecystic fluid to suggest cholecystitis. 3.  Simple right renal cyst.    Dx-portable Fluoro > 1 Hour    Result Date: 7/3/2019  7/3/2019 2:30 PM HISTORY/REASON FOR EXAM:  Intraoperative images for procedural navigation. TECHNIQUE/EXAM DESCRIPTION AND NUMBER OF VIEWS:  24 view(s) of the upper abdomen.     Fluoroscopic image(s) obtained during ERCP. Please see the patient's chart for full procedural details. Fluoroscopy time 2.6 minutes.     Dx-portable Fluoroscopy < 1 Hour    Result Date: 7/8/2019 7/5/2019 2:35 PM HISTORY/REASON FOR EXAM:  Unsuccessful attempt at ERCP, Main OR TECHNIQUE/EXAM DESCRIPTION AND NUMBER OF VIEWS: Portable fluoroscopy for less than one hour FINDINGS:      The  portable fluoroscopy unit was obligated to the procedure for less than one hour.   Actual fluoro time was 3 seconds.     Portable fluoroscopy utilized for 3 seconds.    Ir-perq Biliary Cath Placemt Int-ext  (includes All Radiology)    Result Date: 7/6/2019 7/6/2019 11:39 AM HISTORY/REASON FOR EXAM:  Tumor. Hx contrast allergy. If possible, internal external drain. Failed ERCP x 2. Both ERCP attempts were met with firm obstruction at the ampulla. Biliary obstruction, obstructive jaundice. TECHNIQUE/EXAM DESCRIPTION AND NUMBER OF VIEWS: 1.  Left Percutaneous transhepatic cholangiogram (PTC) 2.  Biliary drainage with internal-external biliary stent-catheter placement 3.  Ultrasound and fluoroscopic guidance COMPARISON:  MRCP 7/4/2019 PROCEDURE:  Case was discussed with DR. HDZ.  Informed consent was obtained. ANTIBIOTIC PROPHYLAXIS: Patient received Flagyl IV piggyback 500 mg and levofloxacin IV piggyback 500 mg prior to the procedure CONTRAST ALLERGY PROPHYLAXIS: Patient received dexamethasone yesterday and no additional steroid prep was administered. Patient received Benadryl 50 mg IV prior to any contrast injection for this procedure. (Patient experienced no adverse effects related  to contrast injection during this procedure). Moderate sedation with Fentanyl and Versed was administered during the procedure with appropriate continuous patient monitoring by the radiology nurse. SEDATION DURATION: 45 minutes CURRENT ALARA PRINCIPLES FOR DOSE REDUCTION TECHNIQUES WERE UTILIZED FOR THIS EXAMINATION. FLUOROSCOPY TIME: 10.9 minutes NUMBER OF FILMS: 11 fluoroscopic frame capture images and/or digital series obtained. CONTRAST AMOUNT: 40 mL Omnipaque Localizing ultrasound images were obtained. The Left upper quadrant and epigastrium was prepped and draped in a sterile manner. Following local anesthesia with 7 cc 1% lidocaine, a 21-gauge needle was advanced into the liver. A transhepatic cholangiogram  was performed. A  0.018 guidewire was then placed and access converted to an AccuStick kit. Additional cholangiography was performed through the AccuStick catheter demonstrating abrupt occlusion at the distal common duct. Next, using an angled Glidewire in combination with a 4 Scottish Kumpe catheter, access was obtained beyond the common duct into the duodenum. Contrast injection confirms catheter tip position within the duodenum. An Amplatz guidewire was placed. Tract dilatation was performed up to 8 Scottish. Next, an 8 Scottish Meditec locking loop internal- external biliary stent-catheteran 8-Scottish locking loop Meditech pigtail catheter was placed. A final cholangiogram was performed documenting satisfactory position of all catheter side holes with no extravasation. A bile specimen of 1 mL was collected and will be submitted for culture and sensitivity, Gram stain, and cell count. The bile appearance was clear, allan. The catheter was secured to the skin and connected to gravity drainage. The patient tolerated the procedure well with no evidence of complication. FINDINGS: There is satisfactory catheter position with no extravasation. Moderate dilatation of intrahepatic and extrahepatic biliary ducts. Abrupt occlusion at the level of the distal common duct. Patent cystic duct with prompt filling of the gallbladder during injection of contrast in the common hepatic duct.     1. Ultrasound and fluoroscopic guided percutaneous transhepatic cholangiogram demonstrating distal common bile duct occlusion concordant with ERCP findings. Moderate dilatation of intrahepatic and extrahepatic biliary ducts. 2. Percutaneous biliary drainage with placement of an 8 Scottish Meditec locking loop internal-external biliary stent-catheter. The catheter is connected to gravity drainage at this time. When hyperbilirubinemia resolves, the catheter can be capped to allow antegrade physiologic bile drainage. The distal common duct obstruction may be amenable to  metallic self-expanding internal stent placement by antegrade technique or rendezvous endoscopic technique.       ASSESSMENT/PLAN:     Katelyn Hill is a 84 y.o.  admitted 7/2/2019. Pt has a past medical history  hyperlipidemia and hypertension who was admitted due to right upper quadrant and epigastric pain as well as jaundice along with dark urine and pale bowel movements.  Due to this she presented to the ED.     Hospital Course:   She has been afebrile.  No leukocytosis.  Labs on admission with elevated AST-ALT as well as significantly elevated alk phos and total bilirubin.  Right upper quadrant ultrasound with intrahepatic biliary dilation and mildly dilated common bile duct, gallbladder sludge but no pericholecystic fluid to suggest cholecystitis she was diagnosed with obstructive jaundice.  GI was consulted and an MRCP was performed.  This was concerning for pancreatic head mass as well as extrahepatic biliary dilation with abrupt narrowing of the common bile duct at the pancreatic head.  No gallstones were noted.  She is had 2 ERCPs but they have been unsuccessful.  She had an unsuccessful ERCP.  She was taken to the OR on 7/3 for a percutaneous transplant cholangiogram, biliary stent was placed and bile cultures were obtained.  Viral hepatitis panel is positive for hepatitis B surface antigen and negative hepatitis B core antibody. She is had steady improvement in AST and ALT as well as alk phos and total bilirubin.  Plan is to go back to the OR for ERCP on 7/10.     Obstructive jaundice, may be due to pancreatic mass  -Unsuccessful ERCP x2  -IR has placed a percutaneous biliary tube for drainage  -Biliary cultures positive for yeast and strep viridans    Bactibilia   -Bile from procedure on 7/7 with yeast and strep viridans    Concern for pancreatic cancer  -MR with several irregular cystic structures in the pancreatic head without discrete mass identified  -CA 19-9 elevated    HBeAg-negative  chronic hepatitis  -Hep B surface antigen reactive, Hep Be antigen negative,  HBV PCR quant of 153, Hep B core IgM negative  --- Monitor no treatment needed at this time  --- Ordered hep B surface antibodies as well as hep B core total antibody    Penicillin allergy, described as diffuse swelling and rash  --- Appears to have happened more than once with the last time approximately 10 years ago, patient seems reliable so will avoid penicillins, unsure if she is been jaundiced cephalosporins    Plan   --- Patient is doing well with no sign of infection such as fever or leukocytosis.  Reviewed literature on presence of bacteria in bile and significance.  There was one small study in which there was an increased risk of postprocedure complications when bacteria was present in the bile.  Due to this will treat for a short course.   --- Levofloxacin 500 mg daily, Flagyl 500 mg every 12 and fluconazole 200 mg daily              Plan of care discussed with KAY Bess M.D.. Will continue to follow    Kamila Molina M.D.

## 2019-07-09 NOTE — TELEPHONE ENCOUNTER
Future Appointments       Provider Department Center    7/16/2019 3:40 PM Monica Rodriguez M.D. [facility]        ESTABLISHED PATIENT PRE-VISIT PLANNING     Patient was NOT contacted to complete PVP.     Note: Patient will not be contacted if there is no indication to call.     1.  Reviewed notes from the last few office visits within the medical group: Yes    2.  If any orders were placed at last visit or intended to be done for this visit (i.e. 6 mos follow-up), do we have Results/Consult Notes?        •  Labs - Labs ordered, completed on 07/02-9/2019 and results are in chart.   Note: If patient appointment is for lab review and patient did not complete labs, check with provider if OK to reschedule patient until labs completed.       •  Imaging - Imaging ordered, completed and results are in chart.       •  Referrals - No referrals were ordered at last office visit.    3. Is this appointment scheduled as a Hospital Follow-Up? Yes, visit was at [facility].     4.  Immunizations were updated in [employer] using WebIZ?: Yes       •  Web Iz Recommendations: FLU, TDAP, VARICELLA (Chicken Pox)  and SHINGRIX (Shingles)    5.  Patient is due for the following Health Maintenance Topics:   Health Maintenance Due   Topic Date Due   • IMM HEP B VACCINE (1 of 3 - Risk 3-dose series) 04/06/1954   • IMM ZOSTER VACCINES (1 of 2) 04/06/1985   • Annual Wellness Visit  05/20/2015       6. Orders for overdue Health Maintenance topics pended in Pre-Charting? YES    7.  AHA (MDX) form printed for Provider? YES    8.  Patient was informed to arrive 15 min prior to their scheduled appointment and bring in their medication bottles.

## 2019-07-09 NOTE — PROGRESS NOTES
Received report and assumed pt care. Pt is A&Ox4. Pt states 5/10 pain located in LUQ on a 0-10 pain scale. Pt states that she will consider pain medication at lunch time. Assessment completed on 1L O2 NC. Pt's last BM was on 7/4/2019 per pt. RN provided Milk of Magnesia. All needs met a this time. Safety precautions and hourly rounding in place.

## 2019-07-10 NOTE — ASSESSMENT & PLAN NOTE
- Secondary to biliary obstruction of unknown etiology. Further work-up as above.  Status post EUS 7/10  Pending pathology report  Trending down

## 2019-07-10 NOTE — ANESTHESIA TIME REPORT
Anesthesia Start and Stop Event Times     Date Time Event    7/10/2019 0829 Anesthesia Start     1056 Anesthesia Stop        Responsible Staff  07/10/19    Name Role Begin End    Nelly Gooden M.D. Anesth 0829 1056        Preop Diagnosis (Free Text):  Pre-op Diagnosis     PANCREATIC MASS        Preop Diagnosis (Codes):  Diagnosis Information     Diagnosis Code(s): Pancreatic mass [K86.9]        Post op Diagnosis  Pancreatic mass      Premium Reason  Non-Premium    Comments:

## 2019-07-10 NOTE — ANESTHESIA PROCEDURE NOTES
Airway  Date/Time: 7/10/2019 8:42 AM  Performed by: JEANNINE DORANTES  Authorized by: JEANNINE DORANTES     Location:  OR  Urgency:  Elective  Indications for Airway Management:  Anesthesia  Spontaneous Ventilation: absent    Sedation Level:  Deep  Preoxygenated: Yes    Patient Position:  Sniffing  Mask Difficulty Assessment:  2 - vent by mask + OA or adjuvant +/- NMBA  Final Airway Type:  Endotracheal airway  Final Endotracheal Airway:  ETT  Cuffed: Yes    Technique Used for Successful ETT Placement:  Video laryngoscopy  Insertion Site:  Oral  Blade Type:  Linda  Laryngoscope Blade/Videolaryngoscope Blade Size:  3  ETT Size (mm):  6.5  Measured from:  Teeth  ETT to Teeth (cm):  21  Placement Verified by: auscultation and capnometry    Cormack-Lehane Classification:  Grade I - full view of glottis  Number of Attempts at Approach:  2   1st attempt was with direct laryngoscopy. Did not see cords. Atraumatic intubation with #3 Glidescope.

## 2019-07-10 NOTE — ASSESSMENT & PLAN NOTE
Patient not on home medication and repeat lipid fell showing .  Given patient abnormal liver function, reasonable to continue hold off statin

## 2019-07-10 NOTE — H&P
Hospital Medicine History & Physical Note    Date of Service  7/10/2019    Primary Care Physician  Monica Rodriguez M.D.    Consultants  GI  ID    Code Status  Full code    Chief Complaint  jaundice and abdominal pain    History of Presenting Illness  84 y.o. female With hyperlipidemia, hypertension, admitted 7/2/2019 with right upper quadrant and epigastric abdominal pain with progressive jaundice over the last 4 days, along with dark urine and pale bowel movements.  Initial work-up showed no leukocytosis, with significant transaminitis with ALT of 283, AST of 237, total bilirubin of 14.5, and alkaline phosphatase of 505.  Lipase was normal.  Right upper quadrant ultrasound showed intrahepatic biliary dilation with mildly dilated CBD, with gallbladder sludge with borderline thickened gallbladder wall, with no pericholecystic fluid to suggest cholecystitis.  Viral hepatitis panel showed reactive hepatitis B surface antigen, with negative hepatitis B core antibody.   MRCP was ordered, and GI was consulted. MRCP showed intrahepatic and extrahepatic biliary dilation with abrupt narrowing of the common bile duct at the pancreatic head with findings concerning for pancreatic head mass, with distended gallbladder, with no stone demonstrated in the gallbladder or biliary tree.  She had an unsuccessful ERCP, with plan for repeat one by a different endoscopist. MRI of the abdomen showed several irregular cystic structures in the pancreatic head without any discrete infiltrative necrotic head mass identified, with moderately dilated main pancreatic duct with dilated side branches. CA 19-9 is high at 849.3. Surgery was consulted. She had another attempt at ERCP, but after numerous attempts was unable to pass the ampulla as the guidewire kept hitting hard tissue and was unable to pass.  GI recommended IR percutaneous biliary drainage. HBV was detected with quantitative testing by NAAS, 153 IU/mL. HBE Ag was negative. She had a  percutaneous transhepatic cholangiogram, with placement of percutaneous drainage.  Biliary fluid Gram stain showed no organisms. Surgery recommended no urgent surgical intervention as needed, and to follow-up with outpatient hepatobiliary surgery.  GI planning to do EUS during this hospitalization. Patient had EUS 7/10 and then GI highly suspect preliminary result of biopsy will be cancer.  Recommend IR to internalize patient's biliary drain.  Patient was readmitted to this hospital for awaiting for pathology report and also for IR to internalize biliary stent.    Patient currently still complains of abdominal pain. Patient's pain is local 5-6/10, intermittent and does not radiate to other location, sharp and with some tingling. Can be controlled by pain meds. Dressing in place.    Review of Systems  Review of Systems   Constitutional: Positive for weight loss. Negative for chills and fever.   HENT: Negative for congestion, ear discharge, ear pain, hearing loss and nosebleeds.    Eyes: Negative for blurred vision and pain.   Respiratory: Negative for cough, sputum production, shortness of breath and wheezing.    Cardiovascular: Negative for chest pain, palpitations, leg swelling and PND.   Gastrointestinal: Positive for abdominal pain and nausea. Negative for constipation, diarrhea, heartburn and vomiting.   Genitourinary: Negative for dysuria, frequency and hematuria.   Musculoskeletal: Negative for back pain, falls, joint pain and neck pain.   Skin: Positive for itching. Negative for rash.   Neurological: Positive for weakness. Negative for dizziness, tremors, speech change, seizures and headaches.   Psychiatric/Behavioral: Negative for depression, substance abuse and suicidal ideas.       Past Medical History   has a past medical history of Arthritis; Cataract; Dyslipidemia; Hypertension; Kidney calculi; Lactose intolerance; Lung nodules; MEDICAL HOME; OA (osteoarthritis); Raynaud's disease /phenomenon; and Torn  rotator cuff (2008). She also has no past medical history of Breast cancer (HCC) or Hypercholesteremia.    Surgical History   has a past surgical history that includes shoulder arthroscopy; appendectomy (1943); tonsillectomy; knee arthroscopy; ercp in or (N/A, 7/3/2019); and ercp in or (N/A, 7/5/2019).     Family History  family history includes Cancer in her maternal grandfather, maternal grandmother, and paternal grandmother; Diabetes in her son; Heart Disease in her father and mother.     Social History   reports that she quit smoking about 34 years ago. Her smoking use included Cigarettes. She has a 30.00 pack-year smoking history. She has never used smokeless tobacco. She reports that she does not drink alcohol or use drugs.    Allergies  Allergies   Allergen Reactions   • Iodine Anaphylaxis and Swelling   • Aspirin    • Clindamycin    • Lactose      Stomach upset    • Penicillins      Hospitalized with reaction   • Robitussin [Guiatuss] Diarrhea   • Shellfish Allergy    • Tetanus Toxoid    • Zithromax [Azithromycin Dihydrate]        Medications  Prior to Admission Medications   Prescriptions Last Dose Informant Patient Reported? Taking?   lisinopril (PRINIVIL) 5 MG Tab  Rx Bottle (For Med Information) No No   Sig: Take 1 Tab by mouth every day.      Facility-Administered Medications: None       Current Facility-Administered Medications:   •  ondansetron (ZOFRAN ODT) dispertab 4 mg, 4 mg, Oral, Q4HRS PRN, Earlene Bess M.D.  •  ondansetron (ZOFRAN) syringe/vial injection 4 mg, 4 mg, Intravenous, Q4HRS PRN, Earlene Bess M.D.  •  senna-docusate (PERICOLACE or SENOKOT S) 8.6-50 MG per tablet 2 Tab, 2 Tab, Oral, BID **AND** polyethylene glycol/lytes (MIRALAX) PACKET 1 Packet, 1 Packet, Oral, QDAY PRN **AND** magnesium hydroxide (MILK OF MAGNESIA) suspension 30 mL, 30 mL, Oral, QDAY PRN **AND** bisacodyl (DULCOLAX) suppository 10 mg, 10 mg, Rectal, QDAY PRN, Earlene Bess M.D.  •  [START ON 7/11/2019] lisinopril (PRINIVIL)  tablet 5 mg, 5 mg, Oral, DAILY, Earlene Bess M.D.  •  morphine (pf) 4 mg/ml injection 1-2 mg, 1-2 mg, Intravenous, Q3HRS PRN, Earlene Bess M.D.  •  oxyCODONE immediate-release (ROXICODONE) tablet 5-10 mg, 5-10 mg, Oral, Q4HRS PRN, Earlene Bess M.D.  •  levoFLOXacin (LEVAQUIN) tablet 500 mg, 500 mg, Oral, DAILY, Kamila Molina M.D.  •  fluconazole (DIFLUCAN) tablet 200 mg, 200 mg, Oral, DAILY, Kamila Molina M.D.  •  metroNIDAZOLE (FLAGYL) tablet 500 mg, 500 mg, Oral, Q12HRS, Kamila Molina M.D.      Physical Exam  Temp:  [36.1 °C (97 °F)-36.6 °C (97.8 °F)] 36.6 °C (97.8 °F)  Pulse:  [65-73] 73  Resp:  [17] 17  BP: (148-152)/(61-63) 152/63  SpO2:  [94 %-98 %] 98 %    Physical Exam   Constitutional: She is oriented to person, place, and time. She appears well-developed and well-nourished.   HENT:   Head: Normocephalic.   Nose: Nose normal.   Mouth/Throat: No oropharyngeal exudate.   Eyes: Pupils are equal, round, and reactive to light. EOM are normal.   Neck: Normal range of motion. Neck supple. No JVD present. No thyromegaly present.   Cardiovascular: Normal rate, regular rhythm and normal heart sounds.  Exam reveals no gallop and no friction rub.    No murmur heard.  Pulmonary/Chest: Effort normal and breath sounds normal. No respiratory distress. She has no wheezes. She has no rales.   Abdominal: Soft. Bowel sounds are normal. She exhibits no distension and no mass. There is tenderness. There is no rebound and no guarding.   Biliary drain in place   Musculoskeletal: Normal range of motion. She exhibits no edema or tenderness.   Lymphadenopathy:     She has no cervical adenopathy.   Neurological: She is alert and oriented to person, place, and time. No cranial nerve deficit.   Skin: Skin is warm. No rash noted.   Psychiatric: Her behavior is normal.       Laboratory:  Recent Labs      07/10/19   0231   WBC  6.0   RBC  3.53*   HEMOGLOBIN  11.1*   HEMATOCRIT  34.5*   MCV  97.7   MCH  31.4   MCHC  32.2*   RDW   54.9*   PLATELETCT  235   MPV  10.2     Recent Labs      07/08/19   0235  07/09/19   0247   SODIUM  137  137   POTASSIUM  4.2  4.0   CHLORIDE  98  98   CO2  32  35*   GLUCOSE  137*  120*   BUN  29*  24*   CREATININE  0.58  0.61   CALCIUM  9.8  9.7     Recent Labs      07/08/19   0235  07/09/19   0247  07/10/19   0231   ALTSGPT  144*  122*  91*   ASTSGOT  92*  67*  48*   ALKPHOSPHAT  341*  294*  232*   TBILIRUBIN  7.2*  6.2*  5.3*   DBILIRUBIN   --    --   2.5*   GLUCOSE  137*  120*   --                  No results for input(s): TROPONINI in the last 72 hours.    Urinalysis:    No results found     Imaging:  IR-PERQ  CHOLANGIOGRAM NEW (INCLUDES ALL RADIOLOGY)    (Results Pending)         Assessment/Plan:  I anticipate this patient will require at least two midnights for appropriate medical management, necessitating inpatient admission.    Obstructive jaundice- (present on admission)   Assessment & Plan    - with right upper quadrant pain and jaundice.  Ultrasound showed mildly dilated common bile duct and intrahepatic biliary dilation.  MRCP showed intrahepatic and extrahepatic biliary dilation with abrupt narrowing of the common bile duct at the pancreatic head with findings concerning for pancreatic head mass, with distended gallbladder, with no stone demonstrated in the gallbladder or biliary tree. No evidence of acute cholecystitis on ultrasound. No signs of cholangitis. 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  No discrete pancreatic mass seen on MRI.  -failed attempts at ERCP x2. Now has percutaneous biliary drainage.   -GI planning for EUS during this hospitalization.  Awaiting arrangements to transfer to [facility] for EUS.   -Will need outpatient referral/follow-up with hepatobiliary surgery (Dr. Pat).  -continue to trend LFTs.  -Continue symptom control with IV morphine PRN, and PRN oxycodone. Continue antiemetics    -Patient had EUS and preliminary results  showing malignancy  -I started patient on fluconazole, Levaquin, Flagyl as recommended per ID.    -I also ordered IR internalization of biliary stent as recommended by GI        Elevated LFTs- (present on admission)   Assessment & Plan    - Secondary to biliary obstruction of unknown etiology. Further work-up as above.  Status post EUS 7/10  Pending pathology report     Severe protein-calorie malnutrition (HCC)- (present on admission)   Assessment & Plan    Poor nutrition status.  Continue supplement  I continue dietitian consultation.     HTN (hypertension)- (present on admission)   Assessment & Plan    Blood pressure stable  Continue home medication       Dyslipidemia- (present on admission)   Assessment & Plan    Patient not on home medication and repeat lipid fell showing .  Given patient abnormal liver function, reasonable to continue hold off statin         VTE prophylaxis: SCD

## 2019-07-10 NOTE — PROGRESS NOTES
Infectious Disease Progress Note    Author: Kamila Molina M.D. Date & Time of service: 7/10/2019  1:57 PM    Chief Complaint:  Bactiibilia     Interval History:  7/10 Interval 24 hours of Vitals/Labs/Micro,and imaging results reviewed as available. See assessment.     Review of Systems:  ROS    Hemodynamics:  Temp (24hrs), Av.3 °C (97.4 °F), Min:36.1 °C (97 °F), Max:36.6 °C (97.8 °F)     Pulse  Av.8  Min: 61  Max: 99           Physical Exam:  Physical Exam    Meds:    Current Facility-Administered Medications:   •  [START ON 2019] fluconazole  •  [START ON 2019] levoFLOXacin  •  metroNIDAZOLE  •  ondansetron  •  ondansetron  •  senna-docusate **AND** polyethylene glycol/lytes **AND** magnesium hydroxide **AND** bisacodyl  •  [START ON 2019] lisinopril  •  morphine injection  •  oxyCODONE immediate-release    Labs:  Recent Labs      07/10/19   023   WBC  6.0   RBC  3.53*   HEMOGLOBIN  11.1*   HEMATOCRIT  34.5*   MCV  97.7   MCH  31.4   RDW  54.9*   PLATELETCT  235   MPV  10.2   NEUTSPOLYS  71.30   LYMPHOCYTES  13.50*   MONOCYTES  12.00   EOSINOPHILS  2.70   BASOPHILS  0.30     Recent Labs      195  19   024   SODIUM  137  137   POTASSIUM  4.2  4.0   CHLORIDE  98  98   CO2  32  35*   GLUCOSE  137*  120*   BUN  29*  24*     Recent Labs      19   0235  19   0247  07/10/19   023   ALBUMIN  3.6  3.5  3.1*   TBILIRUBIN  7.2*  6.2*  5.3*   ALKPHOSPHAT  341*  294*  232*   TOTPROTEIN  6.0  6.0  5.4*   ALTSGPT  144*  122*  91*   ASTSGOT  92*  67*  48*   CREATININE  0.58  0.61   --        Imaging:  Dx-chest-portable (1 View)    Result Date: 2019 2:34 PM HISTORY/REASON FOR EXAM:  jaundice. TECHNIQUE/EXAM DESCRIPTION AND NUMBER OF VIEWS: Single portable view of the chest. COMPARISON: 2019 FINDINGS: The heart is not enlarged. Atherosclerotic calcification is seen.  No focal consolidation, pleural effusion or pneumothorax is identified.  Costophrenic  angles are clear. There is a calcified granuloma in the liver.     No acute cardiopulmonary process is seen. Atherosclerotic plaque.    [id number]-x/o    Result Date: 7/3/2019  7/3/2019 10:23 AM HISTORY/REASON FOR EXAM:  Cholelithiasis. Jaundice, abdominal pain. TECHNIQUE/EXAM DESCRIPTION: Magnetic resonance cholangiopancreatography. Magnetic resonance cholangiopancreatography was performed on with axial, coronal, and sagittal thin and thick section heavily T2-weighted sequences. 3-D cholangiographic multiplanar maximum intensity projection (MIP) images were created on a workstation.. The study was performed on a Ginny 1.5 Beverley MRI scanner. COMPARISON: Rubber quadrant ultrasound 7/3/2019 FINDINGS: Liver shows intrahepatic biliary dilation. Common bile duct measures 10 mm in diameter.  Abrupt narrowing at the pancreatic head. T1 hypointensity in the pancreatic head concerning for mass. Configuration of pancreatic duct suggests pancreas divisum. Gallbladder is distended. No signal void to indicate stone. T2 hyperintense lesions in both kidneys likely indicating cysts. Adrenal glands are unremarkable.     1.  Intrahepatic and extrahepatic biliary dilation with abrupt narrowing of the common bile duct at the pancreatic head with findings concerning for pancreatic head mass. 2.  Distended gallbladder. 3.  No stone demonstrated in the gallbladder or biliary tree. 4.  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    Mr-abdomen-with & W/o    Result Date: 7/5/2019 7/4/2019 1:38 PM HISTORY/REASON FOR EXAM:  Biliary obstruction. TECHNIQUE/EXAM DESCRIPTION: MRI of the pancreas with dynamic IV gadolinium enhancement. MR imaging of the pancreas was performed.  MR images of the pancreas were obtained with coronal and axial single-shot fast spin-echo T2, fat-suppressed axial FRFSE T2, axial in-phase and out-of-phase FSPGR T1, axial DWI with a b-value of 600, 3D MRCP,  precontrast fat-suppressed FSPGR T1, dynamic gadolinium enhanced axial  fat-suppressed T1 FSPGR in the arterial dominant, portal venous, 2-minute and 4-minute delayed phases, with delayed coronal fat-suppressed T1 FSPGR sequence. . The study was performed on a Blendin 1.5 Beverley MRI scanner. 5 mL Gadavist contrast was administered intravenously. COMPARISON: MRCP 7/3/2019 and ultrasound 7/2/2019 FINDINGS: Liver: Homogeneous enhancement. A calcification is in the medial left hepatic lobe as seen on prior CT. No solid mass is identified. Gallbladder and biliary system: There is moderate dilatation of the intrahepatic ducts. The common bile duct is dilated to approximately 8 mm. The gallbladder is distended and contains layering sludge. There is a low posterior insertion of the cystic duct. Pancreas: The pancreas is somewhat atrophic. The pancreatic duct in the mid to distal body measures approximately 3.5 mm. There are dilated side branches. The minor pancreatic duct is patent without evidence of obstruction. There are multiple irregular cystic structures are in the pancreatic head measuring less than a centimeter. No discrete infiltrative solid mass is appreciated. Vasculature: The hepatic, portal, superior mesenteric, and splenic veins are patent. Origins of the celiac and superior mesenteric veins are patent. No definite vascular encasement is appreciated. No vessel attenuation. There is ectasia of the aorta with  atherosclerotic plaque. Spleen: Normal. Kidneys: The kidneys enhance symmetrically. There are bilateral renal cysts. No hydronephrosis is identified. Adrenal glands: Unremarkable. Ascites: No ascites. Lymph nodes: No adenopathy.     1.  Intrahepatic and extrahepatic ductal dilatation, consistent with known biliary obstruction. Gallbladder distention and sludge is likely related to obstruction. 2.  Several irregular cystic structures in the pancreatic head without a discrete infiltrative pancreatic head mass identified. Moderately dilated main pancreatic duct with dilated  side branches. No definite vascular encasement identified. If clinically indicated, pancreatic protocol CT may be helpful for further evaluation. 3.  Atherosclerosis. 4.  Bilateral renal cysts.     Us-ruq    Result Date: 7/2/2019 7/2/2019 2:39 PM HISTORY/REASON FOR EXAM:  Jaundice TECHNIQUE/EXAM DESCRIPTION AND NUMBER OF VIEWS:  Real-time sonography of the liver and biliary tree. COMPARISON: CT dated 11/2/2017 FINDINGS: The liver measures 13.32 cm. The liver is normal in contour. There is no evidence of solid mass lesion. Calcification in the left hepatic lobe. Intrahepatic biliary dilation is present. The gallbladder is normal in size. There is no evidence of cholelithiasis. Gallbladder sludge. The gallbladder wall thickness measures 0.31 cm. There is no pericholecystic fluid. The common duct measures 0.93 cm. The visualized pancreas is unremarkable. The visualized aorta is normal in caliber. Atherosclerosis. Intrahepatic IVC is patent. The portal vein is patent with hepatopetal flow. The MPV measures 0.9 cm. The right kidney measures 10.40 cm. Simple right renal cyst. There is no ascites.     1.  Intrahepatic biliary dilatation with mildly dilated CBD. This may be due to CBD stone, stricture or mass. 2.  Gallbladder sludge with borderline thickened gallbladder wall. No gallstones identified. No pericholecystic fluid to suggest cholecystitis. 3.  Simple right renal cyst.    Dx-portable Fluoro > 1 Hour    Result Date: 7/3/2019  7/3/2019 2:30 PM HISTORY/REASON FOR EXAM:  Intraoperative images for procedural navigation. TECHNIQUE/EXAM DESCRIPTION AND NUMBER OF VIEWS:  24 view(s) of the upper abdomen.     Fluoroscopic image(s) obtained during ERCP. Please see the patient's chart for full procedural details. Fluoroscopy time 2.6 minutes.     Dx-portable Fluoroscopy < 1 Hour    Result Date: 7/10/2019  7/10/2019 9:10 AM HISTORY/REASON FOR EXAM:  Endoscopic cholangiopancreatographyLois TECHNIQUE/EXAM DESCRIPTION  AND NUMBER OF VIEWS: Portable fluoroscopy for less than one hour FINDINGS:      The portable fluoroscopy unit was obligated to the procedure for less than one hour.   Actual fluoro time was 6 minutes.     Portable fluoroscopy utilized for 6 minutes.    Dx-portable Fluoroscopy < 1 Hour    Result Date: 7/8/2019 7/5/2019 2:35 PM HISTORY/REASON FOR EXAM:  Unsuccessful attempt at ERCP, Main OR TECHNIQUE/EXAM DESCRIPTION AND NUMBER OF VIEWS: Portable fluoroscopy for less than one hour FINDINGS:      The portable fluoroscopy unit was obligated to the procedure for less than one hour.   Actual fluoro time was 3 seconds.     Portable fluoroscopy utilized for 3 seconds.    Ir-perq Biliary Cath Placemt Int-ext  (includes All Radiology)    Result Date: 7/6/2019 7/6/2019 11:39 AM HISTORY/REASON FOR EXAM:  Tumor. Hx contrast allergy. If possible, internal external drain. Failed ERCP x 2. Both ERCP attempts were met with firm obstruction at the ampulla. Biliary obstruction, obstructive jaundice. TECHNIQUE/EXAM DESCRIPTION AND NUMBER OF VIEWS: 1.  Left Percutaneous transhepatic cholangiogram (PTC) 2.  Biliary drainage with internal-external biliary stent-catheter placement 3.  Ultrasound and fluoroscopic guidance COMPARISON:  MRCP 7/4/2019 PROCEDURE:  Case was discussed with DR. HDZ.  Informed consent was obtained. ANTIBIOTIC PROPHYLAXIS: Patient received Flagyl IV piggyback 500 mg and levofloxacin IV piggyback 500 mg prior to the procedure CONTRAST ALLERGY PROPHYLAXIS: Patient received dexamethasone yesterday and no additional steroid prep was administered. Patient received Benadryl 50 mg IV prior to any contrast injection for this procedure. (Patient experienced no adverse effects related  to contrast injection during this procedure). Moderate sedation with Fentanyl and Versed was administered during the procedure with appropriate continuous patient monitoring by the radiology nurse. SEDATION DURATION: 45 minutes CURRENT  ALARA PRINCIPLES FOR DOSE REDUCTION TECHNIQUES WERE UTILIZED FOR THIS EXAMINATION. FLUOROSCOPY TIME: 10.9 minutes NUMBER OF FILMS: 11 fluoroscopic frame capture images and/or digital series obtained. CONTRAST AMOUNT: 40 mL Omnipaque Localizing ultrasound images were obtained. The Left upper quadrant and epigastrium was prepped and draped in a sterile manner. Following local anesthesia with 7 cc 1% lidocaine, a 21-gauge needle was advanced into the liver. A transhepatic cholangiogram  was performed. A 0.018 guidewire was then placed and access converted to an AccuStick kit. Additional cholangiography was performed through the AccuStick catheter demonstrating abrupt occlusion at the distal common duct. Next, using an angled Glidewire in combination with a 4 Italian Kumpe catheter, access was obtained beyond the common duct into the duodenum. Contrast injection confirms catheter tip position within the duodenum. An Amplatz guidewire was placed. Tract dilatation was performed up to 8 Italian. Next, an 8 Italian MediteInVivioLink locking loop internal- external biliary stent-catheteran 8-Italian locking loop Snaptu pigtail catheter was placed. A final cholangiogram was performed documenting satisfactory position of all catheter side holes with no extravasation. A bile specimen of 1 mL was collected and will be submitted for culture and sensitivity, Gram stain, and cell count. The bile appearance was clear, allan. The catheter was secured to the skin and connected to gravity drainage. The patient tolerated the procedure well with no evidence of complication. FINDINGS: There is satisfactory catheter position with no extravasation. Moderate dilatation of intrahepatic and extrahepatic biliary ducts. Abrupt occlusion at the level of the distal common duct. Patent cystic duct with prompt filling of the gallbladder during injection of contrast in the common hepatic duct.     1. Ultrasound and fluoroscopic guided percutaneous transhepatic  cholangiogram demonstrating distal common bile duct occlusion concordant with ERCP findings. Moderate dilatation of intrahepatic and extrahepatic biliary ducts. 2. Percutaneous biliary drainage with placement of an 8 Arabic Meditec locking loop internal-external biliary stent-catheter. The catheter is connected to gravity drainage at this time. When hyperbilirubinemia resolves, the catheter can be capped to allow antegrade physiologic bile drainage. The distal common duct obstruction may be amenable to metallic self-expanding internal stent placement by antegrade technique or rendezvous endoscopic technique.       Micro:  Results     ** No results found for the last 168 hours. **           ASSESSMENT/PLAN:      Katelyn Hill is a 84 y.o.  admitted 7/2/2019. Pt has a past medical history  hyperlipidemia and hypertension who was admitted due to right upper quadrant and epigastric pain as well as jaundice along with dark urine and pale bowel movements.  Due to this she presented to the ED.      Hospital Course:   She has been afebrile.  No leukocytosis.  Labs on admission with elevated AST-ALT as well as significantly elevated alk phos and total bilirubin.  Right upper quadrant ultrasound with intrahepatic biliary dilation and mildly dilated common bile duct, gallbladder sludge but no pericholecystic fluid to suggest cholecystitis she was diagnosed with obstructive jaundice.  GI was consulted and an MRCP was performed.  This was concerning for pancreatic head mass as well as extrahepatic biliary dilation with abrupt narrowing of the common bile duct at the pancreatic head.  No gallstones were noted.  She is had 2 ERCPs but they have been unsuccessful.  She had an unsuccessful ERCP.  She was taken to the OR on 7/3 for a percutaneous transplant cholangiogram, biliary stent was placed and bile cultures were obtained.  Viral hepatitis panel is positive for hepatitis B surface antigen and negative hepatitis B  core antibody. She is had steady improvement in AST and ALT as well as alk phos and total bilirubin.  Pt went for ERCP on 7/10 and preliminary path consistent with malignancy.     Interval 24 hour assessment:    Events: procedure today at [facility]  AF, O2 1 L NC  Labs reviewed  Studies reviewed  Micro reviewed    Pt continued on oral antibiotics. She is doing well after her procedure.       Obstructive jaundice, may be due to pancreatic mass  -Unsuccessful ERCP x2  -IR has placed a percutaneous biliary tube for drainage  - 7/10 EUS with biopsy revealed a 24 x 19mm hypoechoic mass with possible portal venous invasion. 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  Unsuccessful ERCP and attempted Rendezvous for internalization of the biliary drain.      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  Reviewed literature on presence of bacteria in bile and significance.  There was one small study in which there was an increased risk of postprocedure complications when bacteria was present in the bile.  Due to this will treat for a short course.   --- Levofloxacin 500 mg daily, Flagyl 500 mg every 12 and fluconazole 200 mg daily- continue  for ~ 7 days and assess             Plan of care discussed with KAY Bess M.D.. Will continue to follow     Kamila Molina M.D.

## 2019-07-10 NOTE — OR NURSING
"1057 To PACU from OR via gurney, side rails up x 2 for safety, lungs clear bilaterally but diminished to bases. Drain noted from LUQ draining dark brown fluid with CDI dressing. Pt awake on arrival and makes eye contact; says \"hi\" to RN but does not respond to questions. Breathing easy and unlabored. BS x 4Q hypoactive. Noted slight jaundice to skin.   1105 Pt denies pain or nausea. Abdomen soft and non tender.   1110 call to Dr Melton for orders to give sips of water; TORB ok to give clear liquids. Dr Melton to talk to patient in recovery; pt updated. Pt remains awake and responds appropriately to RN.   1125 Pt remains awake and denies nausea or pain. Pt rinsing mouth out from \"ugly taste\" with water. [facility] to arrive at noon at this time.  1133 call to TINA Rose, receiving nurse at [facility] x [phone]. RN states patient has no dysphagia and had orders for clear liquids; plan to give sips of water.   1140 Pt reports incontinence but scant noted on assessment. Placed warm blankets over patient for comfort and given fresh mavis-pad in underwear. Pt denies urge to void and declines bedpan. Meets criteria for transfer back to [facility].   1150 Dr Melton here to see patient in recovery and reviewed procedure and plan of care with her. Pt resting quietly on gurney but remains awake. Pt tolerating sips of water.   1200 Pt remains awake and talking with RN. Tolerating sips of water. Denies nausea or pain.   1215 Pt remains awake and talking with CNA from [facility]. Discharge pending arrival of [facility]. Pt denies pain and nausea.   1224 Discharge to [facility] via [facility]   " upper

## 2019-07-10 NOTE — ANESTHESIA POSTPROCEDURE EVALUATION
Patient: Katelyn Hill    Procedure Summary     Date:  07/10/19 Room / Location:   ENDOSCOPIC ULTRASOUND ROOM / SURGERY [facility]    Anesthesia Start:  0829 Anesthesia Stop:  1056    Procedures:       ERCP (ENDOSCOPIC RETROGRADE CHOLANGIOPANCREATOGRAPHY) - WITH POSSIBLE BIOPSY SPHINCTEROTOMY, STENT PLACEMENT, STENT REMOVAL, STONE EXTRACTION, DILATION      GASTROSCOPY      EGD, WITH ENDOSCOPIC US      EGD, WITH ASPIRATION BIOPSY Diagnosis:       Pancreatic mass      (PANCREATIC MASS)    Surgeon:  Braxton Melton M.D. Responsible Provider:  Nelly Gooden M.D.    Anesthesia Type:  general ASA Status:  2          Final Anesthesia Type: general  Last vitals  BP   Blood Pressure : 157/68    Temp   36.9 °C (98.4 °F)    Pulse   Pulse: 84, Heart Rate (Monitored): 69   Resp   16    SpO2   100 %      Anesthesia Post Evaluation    Patient location during evaluation: PACU  Patient participation: complete - patient participated  Level of consciousness: awake and alert  Pain score: 2    Airway patency: patent  Anesthetic complications: no  Cardiovascular status: hemodynamically stable  Respiratory status: acceptable  Hydration status: euvolemic    PONV: none           Nurse Pain Score: 0 (NPRS)

## 2019-07-10 NOTE — ASSESSMENT & PLAN NOTE
Poor nutrition status.  Continue supplement  I continue dietitian consultation.  Pending nursing care facility to accept patient

## 2019-07-10 NOTE — ANESTHESIA PREPROCEDURE EVALUATION
83 yo female lives independently, walks with cane until 9 days ago when current problem started. Two recent EKG's show LBBB, not present in previous years. I do not see a workup for this.   Pt. Has had two ERCP procedures during the last week.    Relevant Problems   (+) Chronic hepatitis B (HCC)   (+) HTN (hypertension)       Physical Exam    Airway   Mallampati: II  TM distance: >3 FB  Neck ROM: full       Cardiovascular - normal exam  Rhythm: regular  Rate: normal  (-) murmur     Dental - normal exam      Very poor dentition   Pulmonary - normal exam  Breath sounds clear to auscultation     Abdominal    Neurological - normal exam                 Anesthesia Plan    ASA 2       Plan - general       Airway plan will be ETT        Induction: intravenous    Postoperative Plan: Postoperative administration of opioids is intended.    Pertinent diagnostic labs and testing reviewed    Informed Consent:    Anesthetic plan and risks discussed with patient.    Use of blood products discussed with: patient whom consented to blood products.

## 2019-07-10 NOTE — ANESTHESIA QCDR
2019 Qualified Clinical Data Registry (for Quality Improvement)     Postoperative nausea/vomiting risk protocol (Adult = 18 yrs and Pediatric 3-17 yrs)- (430 and 463)  General inhalation anesthetic (NOT TIVA) with PONV risk factors: Yes  Provision of anti-emetic therapy with at least 2 different classes of agents: Yes   Patient DID NOT receive anti-emetic therapy and reason is documented in Medical Record:  N/A    Multimodal Pain Management- (AQI59)  Patient undergoing Elective Surgery (i.e. Outpatient, or ASC, or Prescheduled Surgery prior to Hospital Admission): No  Use of Multimodal Pain Management, two or more drugs and/or interventions, NOT including systemic opioids: N/A  Exception: Documented allergy to multiple classes of analgesics: N/A    PACU assessment of acute postoperative pain prior to Anesthesia Care End- Applies to Patients Age = 18- (ABG7)  Initial PACU pain score is which of the following: < 7/10  Patient unable to report pain score: N/A    Post-anesthetic transfer of care checklist/protocol to PACU/ICU- (426 and 427)  Upon conclusion of case, patient transferred to which of the following locations: PACU/Non-ICU  Use of transfer checklist/protocol: Yes  Exclusion: Service Performed in Patient Hospital Room (and thus did not require transfer): N/A    PACU Reintubation- (AQI31)  General anesthesia requiring endotracheal intubation (ETT) along with subsequent extubation in OR or PACU:   Required reintubation in the PACU:   Extubation was a planned trial documented in the medical record prior to removal of the original airway device:     Unplanned admission to ICU related to anesthesia service up through end of PACU care- (MD51)  Unplanned admission to ICU (not initially anticipated at anesthesia start time):

## 2019-07-10 NOTE — CARE PLAN
Problem: Safety  Goal: Will remain free from falls  Outcome: PROGRESSING AS EXPECTED  Reinforced the use of call light when needing assistance. Treaded socks on. Bed in lowest position. Personal belongings within reach. Clutter free environment provided.    Problem: Infection  Goal: Will remain free from infection  Outcome: PROGRESSING AS EXPECTED  Reinforced the importance of handwashing and encouraged to do it as possible

## 2019-07-10 NOTE — ASSESSMENT & PLAN NOTE
- with right upper quadrant pain and jaundice.  Ultrasound showed mildly dilated common bile duct and intrahepatic biliary dilation.  MRCP showed intrahepatic and extrahepatic biliary dilation with abrupt narrowing of the common bile duct at the pancreatic head with findings concerning for pancreatic head mass, with distended gallbladder, with no stone demonstrated in the gallbladder or biliary tree. No evidence of acute cholecystitis on ultrasound. No signs of cholangitis. 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  No discrete pancreatic mass seen on MRI.  -failed attempts at ERCP x2. Now has percutaneous biliary drainage.   -GI planning for EUS during this hospitalization.  Awaiting arrangements to transfer to [facility] for EUS.   -Will need outpatient referral/follow-up with hepatobiliary surgery (Dr. Pat).  -continue to trend LFTs.  -Continue symptom control with IV morphine PRN, and PRN oxycodone. Continue antiemetics    -Patient had EUS and preliminary results showing malignancy  -I consulted oncologist Dr. Boyer and pancreatic surgeon Dr. Avalos as recommended by GI.  -I started patient on fluconazole, Levaquin, Flagyl as recommended per ID.    -- patient had IR internalization of biliary stent 7/11 with good success  Pathology report showing malignancy.  I consulted oncologist, per oncologist recommendation, patient need to follow-up as outpatient pending final decision of surgery by Dr. Pat.  Patient also need to finish CT pancreas protocol as outpatient after finishing antibiotics.  Currently comfortable and pending nursing care facility placement.

## 2019-07-10 NOTE — PROGRESS NOTES
Patient is A&O x 4, pleasant and cooperative with cares. Ambulates to bathroom with one assist. Closed suction drain to left abdomen intact. No c/o pain at this time. Needs attended to.

## 2019-07-10 NOTE — OP REPORT
DATE OF SERVICE:  07/10/2019     INDICATION FOR PROCEDURE:  pancreatic head mass, obstructive jaundice    PROCEDURE PERFORMED: EUS with FN biopsy and attempted ERCP     CONSENT:  Informed consent was obtained directly from the patient after benefits, risks and possible alternatives were discussed.     MEDICATIONS:  The patient received General anesthesia for this procedure. The anesthesiologist was Nelly Gooden MD. Please see the anesthesia record for details     EUS PROCEDURE DESCRIPTION:  The patient was placed in the prone position after sedation and intubation by the anesthesiologist. Her vital signs were monitored continuously throughout the procedure.  When ready, the upper endoscope was placed in the patient's mouth and advanced easily and carefully to the esophagus and subsequently to the stomach and duodenum. The scope was then slowly withdrawn and mucosa examined. Retroflexion was performed in the stomach. The EGD scope was removed.    The EUS scope was then selected. The scope was placed in the mouth and advanced with semi-direct visualization through the oropharynx to the esophagus, stomach and duodenum. Ultrasound images are noted below.      FINDINGS:    Esophagus: normal    Stomach: normal    Duodenum: the internal-external drain was seen coiled in the duodenum    EUS: The linear scope was selected. The gastric wall appeared normal. The liver appeared normal. There was no intrahepatic ductal dilation. The aorta, celiac artery and SMA appeared normal. The left kidney, left adrenal and spleen appeared normal. The pancreas appeared normal. The PD measured 3.0mm. The scope was advanced to the duodenal bulb. The duodenal wall appeared normal. The CBD was seen with the previously placed stent traversing to the papilla. There was a 24 x 19mm hypoechoic mass that surrounded the CBD. This appeared to be a primary pancreatic mass. This caused compression of the portal vein with areas of possible portal venous  penetration. After initial inspection, the mass was biopsied using a 22g core needle and initial pathology was consistent with malignancy. The mass was also seen from the uncinate pancreas. The esophageal wall was normal. There was no lymphadenopathy present.     ERCP DESCRIPTION OF PROCEDURE:  A side-viewing duodenoscope was passed carefully and easily under semi-direct visualization into the esophagus and passed through the stomach to the duodenum. Upon reaching the second portion of the duodenum, the scope was withdrawn to the short-position.The ampulla was identified. The previously placed internal external drain was seen coiled around the papilla.    Using a Prestadero Rx44 sphincterotome preloaded with a 0.035 wire, attempts were made to cannulate the CBD. Initially a 0.035in wire was used and after unsuccessful passage of the wire to the proximal CBD, a 0.025in guidewire was used. There was still difficulty passing the wire. A needle knife was selected to open the CBD and allow passage of the wire over the stent. A small pre-cut sphincterotomy was performed over the stent. The needle knife cannula and a balloon cannula were then used to try and cannulate the CBD over the wire. This was also unsuccessful. Finally, a 0.025in guidewire was passed through the internal external drain but the wire would not pass through the drain without exiting through the stent fenestrations. Rendezvous was unsuccessful. The procedure was then aborted and all instruments were removed.     COMPLICATIONS:  No complications or blood loss during or in the immediate postoperative period.     IMPRESSION:  1)EUS with biopsy revealed a 24 x 19mm hypoechoic mass with possible portal venous invasion. Preliminary biopsy results were consistent with malignancy  2) unsuccessful ERCP and attempted Rendezvous for internalization of the biliary drain     RECOMMENDATION:    1) restart diet  2) When patient returns to [facility], an order should  be placed for IR to attempt internalization of the biliary stent  3) transfer back to [facility] today  4) f/u pathology to determine next course of action

## 2019-07-10 NOTE — CARE PLAN
Problem: Communication  Goal: The ability to communicate needs accurately and effectively will improve  Outcome: PROGRESSING AS EXPECTED  Pt encouraged to voice needs and concerns, pt verbalized understanding

## 2019-07-11 NOTE — PROGRESS NOTES
Hospital Medicine Daily Progress Note    Date of Service  7/11/2019    Chief Complaint  84 y.o. female admitted 7/10/2019 with Obstructive jaundice    Hospital Course    PMH HTN and hyperlipidemia presented with obstructive jaundice.  Patient successfully had EUS prior to admission and the preliminary results showing malignancy.  Oncologist and pancreatic surgeon both were consulted.        Interval Problem Update  7/11.  Patient has no acute event.  Patient tolerated IR internalization of biliary stent today very well without complications.  Patient still complains of mild abdominal pain. Patient's pain is local 3-4/10, intermittent and does not radiate to other location, sharp and with some tingling. Can be controlled by pain meds.     Consultants/Specialty  GI  Pancreatic surgery  Oncologist  ID    Code Status  Full ocde    Disposition  TBD pending PT OT    Review of Systems  Review of Systems   Constitutional: Positive for weight loss. Negative for chills and fever.   HENT: Negative for congestion, ear discharge, ear pain, hearing loss and nosebleeds.    Eyes: Negative for blurred vision and pain.   Respiratory: Negative for cough, sputum production, shortness of breath and wheezing.    Cardiovascular: Negative for chest pain, palpitations, leg swelling and PND.   Gastrointestinal: Positive for abdominal pain. Negative for constipation, diarrhea, heartburn, nausea and vomiting.   Genitourinary: Negative for dysuria, frequency and hematuria.   Musculoskeletal: Negative for back pain, falls, joint pain and neck pain.   Skin: Negative for rash.   Neurological: Positive for weakness. Negative for dizziness, tremors, speech change, seizures and headaches.   Psychiatric/Behavioral: Negative for depression, substance abuse and suicidal ideas.        Physical Exam  Temp:  [36.2 °C (97.1 °F)-36.9 °C (98.4 °F)] 36.3 °C (97.4 °F)  Pulse:  [60-89] 68  Resp:  [12-19] 15  BP: (122-155)/(53-69) 151/69  SpO2:  [91 %-100 %] 100  %    Physical Exam   Constitutional: She is oriented to person, place, and time. She appears well-nourished. No distress.   HENT:   Head: Atraumatic.   Nose: Nose normal.   Mouth/Throat: No oropharyngeal exudate.   Eyes: Conjunctivae and EOM are normal.   Neck: Neck supple. No JVD present. No thyromegaly present.   Cardiovascular: Regular rhythm and normal heart sounds.  Exam reveals no gallop and no friction rub.    No murmur heard.  Pulmonary/Chest: Effort normal and breath sounds normal. No respiratory distress. She has no rales. She exhibits no tenderness.   Abdominal: Soft. Bowel sounds are normal. She exhibits no distension and no mass. There is no tenderness. There is no guarding.   Musculoskeletal: Normal range of motion. She exhibits no edema or tenderness.   Lymphadenopathy:     She has no cervical adenopathy.   Neurological: She is alert and oriented to person, place, and time. No cranial nerve deficit.   Skin: Skin is warm. No rash noted.   Psychiatric: Her behavior is normal.       Fluids    Intake/Output Summary (Last 24 hours) at 07/11/19 1451  Last data filed at 07/1935   Gross per 24 hour   Intake              120 ml   Output                0 ml   Net              120 ml       Laboratory  Recent Labs      07/10/19   0231  07/11/19   0214   WBC  6.0  7.6   RBC  3.53*  3.61*   HEMOGLOBIN  11.1*  11.3*   HEMATOCRIT  34.5*  35.5*   MCV  97.7  98.3*   MCH  31.4  31.3   MCHC  32.2*  31.8*   RDW  54.9*  54.4*   PLATELETCT  235  273   MPV  10.2  10.2     Recent Labs      07/09/19   0247   SODIUM  137   POTASSIUM  4.0   CHLORIDE  98   CO2  35*   GLUCOSE  120*   BUN  24*   CREATININE  0.61   CALCIUM  9.7                   Imaging  IR-PERQ  CHOLANGIOGRAM NEW (INCLUDES ALL RADIOLOGY)    (Results Pending)   CT-PANCREAS AND ABDOMEN WITH & W/O    (Results Pending)        Assessment/Plan  Obstructive jaundice- (present on admission)   Assessment & Plan    - with right upper quadrant pain and jaundice.   Ultrasound showed mildly dilated common bile duct and intrahepatic biliary dilation.  MRCP showed intrahepatic and extrahepatic biliary dilation with abrupt narrowing of the common bile duct at the pancreatic head with findings concerning for pancreatic head mass, with distended gallbladder, with no stone demonstrated in the gallbladder or biliary tree. No evidence of acute cholecystitis on ultrasound. No signs of cholangitis. 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  No discrete pancreatic mass seen on MRI.  -failed attempts at ERCP x2. Now has percutaneous biliary drainage.   -GI planning for EUS during this hospitalization.  Awaiting arrangements to transfer to [facility] for EUS.   -Will need outpatient referral/follow-up with hepatobiliary surgery (Dr. Pat).  -continue to trend LFTs.  -Continue symptom control with IV morphine PRN, and PRN oxycodone. Continue antiemetics    -Patient had EUS and preliminary results showing malignancy  -I consulted oncologist Dr. Boyer and pancreatic surgeon Dr. Avalos as recommended by GI.  -I started patient on fluconazole, Levaquin, Flagyl as recommended per ID.    -- patient had IR internalization of biliary stent 7/11 with good success  I ordered CT pancreatic protocol as requested by Dr. Avalos  Pending further recommendation.     Elevated LFTs- (present on admission)   Assessment & Plan    - Secondary to biliary obstruction of unknown etiology. Further work-up as above.  Status post EUS 7/10  Pending pathology report  Trending down     Severe protein-calorie malnutrition (HCC)- (present on admission)   Assessment & Plan    Poor nutrition status.  Continue supplement  I continue dietitian consultation.     HTN (hypertension)- (present on admission)   Assessment & Plan    Blood pressure stable  Continue home medication       Dyslipidemia- (present on admission)   Assessment & Plan    Patient not on home  medication and repeat lipid fell showing .  Given patient abnormal liver function, reasonable to continue hold off statin          VTE prophylaxis: lovenox      Current Facility-Administered Medications:   •  enoxaparin (LOVENOX) inj 30 mg, 30 mg, Subcutaneous, DAILY, Earlene Bess M.D.  •  ondansetron (ZOFRAN ODT) dispertab 4 mg, 4 mg, Oral, Q4HRS PRN, Earlene Bess M.D.  •  ondansetron (ZOFRAN) syringe/vial injection 4 mg, 4 mg, Intravenous, Q4HRS PRN, Earlene Bess M.D.  •  senna-docusate (PERICOLACE or SENOKOT S) 8.6-50 MG per tablet 2 Tab, 2 Tab, Oral, BID **AND** polyethylene glycol/lytes (MIRALAX) PACKET 1 Packet, 1 Packet, Oral, QDAY PRN **AND** magnesium hydroxide (MILK OF MAGNESIA) suspension 30 mL, 30 mL, Oral, QDAY PRN **AND** bisacodyl (DULCOLAX) suppository 10 mg, 10 mg, Rectal, QDAY PRN, Earlene Bess M.D.  •  lisinopril (PRINIVIL) tablet 5 mg, 5 mg, Oral, DAILY, Earlene Bess M.D., 5 mg at 07/11/19 0657  •  morphine (pf) 4 mg/ml injection 1-2 mg, 1-2 mg, Intravenous, Q3HRS PRN, Earlene Bess M.D.  •  oxyCODONE immediate-release (ROXICODONE) tablet 5-10 mg, 5-10 mg, Oral, Q4HRS PRN, Earlene Bess M.D., 10 mg at 07/11/19 1355  •  levoFLOXacin (LEVAQUIN) tablet 500 mg, 500 mg, Oral, DAILY, Kamila Molina M.D., 500 mg at 07/11/19 0656  •  fluconazole (DIFLUCAN) tablet 200 mg, 200 mg, Oral, DAILY, Kamila Molina M.D., 200 mg at 07/11/19 0656  •  metroNIDAZOLE (FLAGYL) tablet 500 mg, 500 mg, Oral, Q12HRS, Kamila Molina M.D., 500 mg at 07/11/19 0657

## 2019-07-11 NOTE — PROGRESS NOTES
IR Procedure Note:    Dr. Navas consented patient prior to procedure; all questions answered.    Site confirmed with imaging guidance by patient, physician, RT, and RN.     Dr. Navas completed an internalization of biliary stent.  The patient tolerated the procedure well; ETCo2 range 21-26, with consistent waveform during the procedure.      Metapore and gauze applied to left upper abdomen, .  Patient alert, oriented and verbally appropriate post procedure, patient remained hemodynamically stable during procedure and transport, see flow sheet for vital signs.  Report given to TINA Limon.  RN transported patient to S620 with SaO2 monitor @ 95% on oxygen via NC on  2 lpm.     Sedation End Time:1227

## 2019-07-11 NOTE — OR SURGEON
Immediate Post- Operative Note        PostOp Diagnosis: Biliary obstruction s/p PTBD on 7/6/19.       Procedure(s): Internalize PTBD with wallflex removable stent. Tube cholangiogram.       Estimated Blood Loss: Less than 5 ml        Complications: None            7/11/2019     1225 PM     Thomas Navas

## 2019-07-11 NOTE — CARE PLAN
Problem: Safety  Goal: Will remain free from falls  Outcome: PROGRESSING AS EXPECTED  Reinforced the use of call light when needing assistance. Treaded socks on. Bed in lowest position. Personal belongings within reach. Clutter free environment provided.    Problem: Pain Management  Goal: Pain level will decrease to patient's comfort goal  Outcome: PROGRESSING AS EXPECTED  Encourage patient to implement non pharmacologic measures for pain such as, repositioning, heat or warm pack.

## 2019-07-11 NOTE — RESPIRATORY CARE
COPD EDUCATION by COPD CLINICAL EDUCATOR  7/11/2019 at 7:30 AM by Angelina Graves     Patient's chart reviewed by COPD education team. Patient does not have an order for Respiratory Care Protocol, therefore the COPD cannot treat.

## 2019-07-11 NOTE — PROGRESS NOTES
Patient A&A x 4, in good mood and cooperative with cares. No c/o pain or distress at this time. NPO post midnight for Cholangiogram. Needs attended to.

## 2019-07-11 NOTE — PROGRESS NOTES
Infectious Disease Progress Note    Author: Kamila Molina M.D. Date & Time of service: 2019  8:25 AM    Chief Complaint:  Bactiibilia      Interval History:  7/10 Interval 24 hours of Vitals/Labs/Micro,and imaging results reviewed as available. See assessment.       Review of Systems:  Review of Systems   Constitutional: Negative for chills and fever.   Gastrointestinal: Positive for abdominal pain. Negative for constipation, diarrhea, nausea and vomiting.   Musculoskeletal: Negative for myalgias.       Hemodynamics:  Temp (24hrs), Av.4 °C (97.6 °F), Min:36.2 °C (97.1 °F), Max:36.9 °C (98.4 °F)  Temperature: 36.2 °C (97.1 °F)  Pulse  Av.9  Min: 60  Max: 99   Blood Pressure : 155/61       Physical Exam:  Physical Exam   Constitutional: She is oriented to person, place, and time. She appears well-developed.   thin   HENT:   Head: Normocephalic and atraumatic.   Eyes: Pupils are equal, round, and reactive to light. Conjunctivae and EOM are normal.   Cardiovascular: Normal rate, regular rhythm and normal heart sounds.    Pulmonary/Chest: Effort normal and breath sounds normal.   Abdominal: Soft. Bowel sounds are normal. She exhibits no distension. There is tenderness. There is no rebound and no guarding.   Biliary drain in place   Musculoskeletal: Normal range of motion. She exhibits no edema.   Neurological: She is alert and oriented to person, place, and time.   Skin: Skin is warm and dry.   Psychiatric: She has a normal mood and affect. Her behavior is normal.       Meds:    Current Facility-Administered Medications:   •  ondansetron  •  ondansetron  •  senna-docusate **AND** polyethylene glycol/lytes **AND** magnesium hydroxide **AND** bisacodyl  •  lisinopril  •  morphine injection  •  oxyCODONE immediate-release  •  levoFLOXacin  •  fluconazole  •  metroNIDAZOLE    Labs:  Recent Labs      07/10/19   0231  19   0214   WBC  6.0  7.6   RBC  3.53*  3.61*   HEMOGLOBIN  11.1*  11.3*    HEMATOCRIT  34.5*  35.5*   MCV  97.7  98.3*   MCH  31.4  31.3   RDW  54.9*  54.4*   PLATELETCT  235  273   MPV  10.2  10.2   NEUTSPOLYS  71.30  81.50*   LYMPHOCYTES  13.50*  7.10*   MONOCYTES  12.00  10.90   EOSINOPHILS  2.70  0.10   BASOPHILS  0.30  0.10     Recent Labs      07/09/19   0247   SODIUM  137   POTASSIUM  4.0   CHLORIDE  98   CO2  35*   GLUCOSE  120*   BUN  24*     Recent Labs      07/09/19   0247  07/10/19   0231  07/11/19   0214   ALBUMIN  3.5  3.1*  3.4   TBILIRUBIN  6.2*  5.3*  5.1*   ALKPHOSPHAT  294*  232*  229*   TOTPROTEIN  6.0  5.4*  5.9*   ALTSGPT  122*  91*  81*   ASTSGOT  67*  48*  39   CREATININE  0.61   --    --        Imaging:  Dx-chest-portable (1 View)    Result Date: 7/2/2019 7/2/2019 2:34 PM HISTORY/REASON FOR EXAM:  jaundice. TECHNIQUE/EXAM DESCRIPTION AND NUMBER OF VIEWS: Single portable view of the chest. COMPARISON: 5/8/2019 FINDINGS: The heart is not enlarged. Atherosclerotic calcification is seen.  No focal consolidation, pleural effusion or pneumothorax is identified.  Costophrenic angles are clear. There is a calcified granuloma in the liver.     No acute cardiopulmonary process is seen. Atherosclerotic plaque.    [id number]    Result Date: 7/3/2019  7/3/2019 10:23 AM HISTORY/REASON FOR EXAM:  Cholelithiasis. Jaundice, abdominal pain. TECHNIQUE/EXAM DESCRIPTION: Magnetic resonance cholangiopancreatography. Magnetic resonance cholangiopancreatography was performed on with axial, coronal, and sagittal thin and thick section heavily T2-weighted sequences. 3-D cholangiographic multiplanar maximum intensity projection (MIP) images were created on a workstation.. The study was performed on a [employer] 1.5 Bever[employer] MRI scanner. COMPARISON: Rubber quadrant ultrasound 7/3/2019 FINDINGS: Liver shows intrahepatic biliary dilation. Common bile duct measures 10 mm in diameter.  Abrupt narrowing at the pancreatic head. T1 hypointensity in the pancreatic head concerning for mass.  Configuration of pancreatic duct suggests pancreas divisum. Gallbladder is distended. No signal void to indicate stone. T2 hyperintense lesions in both kidneys likely indicating cysts. Adrenal glands are unremarkable.     1.  Intrahepatic and extrahepatic biliary dilation with abrupt narrowing of the common bile duct at the pancreatic head with findings concerning for pancreatic head mass. 2.  Distended gallbladder. 3.  No stone demonstrated in the gallbladder or biliary tree. 4.  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    Mr-abdomen-with & W/o    Result Date: 7/5/2019 7/4/2019 1:38 PM HISTORY/REASON FOR EXAM:  Biliary obstruction. TECHNIQUE/EXAM DESCRIPTION: MRI of the pancreas with dynamic IV gadolinium enhancement. MR imaging of the pancreas was performed.  MR images of the pancreas were obtained with coronal and axial single-shot fast spin-echo T2, fat-suppressed axial FRFSE T2, axial in-phase and out-of-phase FSPGR T1, axial DWI with a b-value of 600, 3D MRCP,  precontrast fat-suppressed FSPGR T1, dynamic gadolinium enhanced axial fat-suppressed T1 FSPGR in the arterial dominant, portal venous, 2-minute and 4-minute delayed phases, with delayed coronal fat-suppressed T1 FSPGR sequence. . The study was performed on a [employer] 1.5 Beverley MRI scanner. 5 mL Gadavist contrast was administered intravenously. COMPARISON: MRCP 7/3/2019 and ultrasound 7/2/2019 FINDINGS: Liver: Homogeneous enhancement. A calcification is in the medial left hepatic lobe as seen on prior CT. No solid mass is identified. Gallbladder and biliary system: There is moderate dilatation of the intrahepatic ducts. The common bile duct is dilated to approximately 8 mm. The gallbladder is distended and contains layering sludge. There is a low posterior insertion of the cystic duct. Pancreas: The pancreas is somewhat atrophic. The pancreatic duct in the mid to distal body measures approximately 3.5 mm. There are dilated side branches. The minor pancreatic duct  is patent without evidence of obstruction. There are multiple irregular cystic structures are in the pancreatic head measuring less than a centimeter. No discrete infiltrative solid mass is appreciated. Vasculature: The hepatic, portal, superior mesenteric, and splenic veins are patent. Origins of the celiac and superior mesenteric veins are patent. No definite vascular encasement is appreciated. No vessel attenuation. There is ectasia of the aorta with  atherosclerotic plaque. Spleen: Normal. Kidneys: The kidneys enhance symmetrically. There are bilateral renal cysts. No hydronephrosis is identified. Adrenal glands: Unremarkable. Ascites: No ascites. Lymph nodes: No adenopathy.     1.  Intrahepatic and extrahepatic ductal dilatation, consistent with known biliary obstruction. Gallbladder distention and sludge is likely related to obstruction. 2.  Several irregular cystic structures in the pancreatic head without a discrete infiltrative pancreatic head mass identified. Moderately dilated main pancreatic duct with dilated side branches. No definite vascular encasement identified. If clinically indicated, pancreatic protocol CT may be helpful for further evaluation. 3.  Atherosclerosis. 4.  Bilateral renal cysts.     Us-ruq    Result Date: 7/2/2019 7/2/2019 2:39 PM HISTORY/REASON FOR EXAM:  Jaundice TECHNIQUE/EXAM DESCRIPTION AND NUMBER OF VIEWS:  Real-time sonography of the liver and biliary tree. COMPARISON: CT dated 11/2/2017 FINDINGS: The liver measures 13.32 cm. The liver is normal in contour. There is no evidence of solid mass lesion. Calcification in the left hepatic lobe. Intrahepatic biliary dilation is present. The gallbladder is normal in size. There is no evidence of cholelithiasis. Gallbladder sludge. The gallbladder wall thickness measures 0.31 cm. There is no pericholecystic fluid. The common duct measures 0.93 cm. The visualized pancreas is unremarkable. The visualized aorta is normal in caliber.  Atherosclerosis. Intrahepatic IVC is patent. The portal vein is patent with hepatopetal flow. The MPV measures 0.9 cm. The right kidney measures 10.40 cm. Simple right renal cyst. There is no ascites.     1.  Intrahepatic biliary dilatation with mildly dilated CBD. This may be due to CBD stone, stricture or mass. 2.  Gallbladder sludge with borderline thickened gallbladder wall. No gallstones identified. No pericholecystic fluid to suggest cholecystitis. 3.  Simple right renal cyst.    Dx-portable Fluoro > 1 Hour    Result Date: 7/3/2019  7/3/2019 2:30 PM HISTORY/REASON FOR EXAM:  Intraoperative images for procedural navigation. TECHNIQUE/EXAM DESCRIPTION AND NUMBER OF VIEWS:  24 view(s) of the upper abdomen.     Fluoroscopic image(s) obtained during ERCP. Please see the patient's chart for full procedural details. Fluoroscopy time 2.6 minutes.     Dx-portable Fluoroscopy < 1 Hour    Result Date: 7/10/2019  7/10/2019 9:10 AM HISTORY/REASON FOR EXAM:  Endoscopic cholangiopancreatography, Lois Fraser TECHNIQUE/EXAM DESCRIPTION AND NUMBER OF VIEWS: Portable fluoroscopy for less than one hour FINDINGS:      The portable fluoroscopy unit was obligated to the procedure for less than one hour.   Actual fluoro time was 6 minutes.     Portable fluoroscopy utilized for 6 minutes.    Dx-portable Fluoroscopy < 1 Hour    Result Date: 7/8/2019 7/5/2019 2:35 PM HISTORY/REASON FOR EXAM:  Unsuccessful attempt at ERCP, [facility] TECHNIQUE/EXAM DESCRIPTION AND NUMBER OF VIEWS: Portable fluoroscopy for less than one hour FINDINGS:      The portable fluoroscopy unit was obligated to the procedure for less than one hour.   Actual fluoro time was 3 seconds.     Portable fluoroscopy utilized for 3 seconds.    Ir-perq Biliary Cath Placemt Int-ext  (includes All Radiology)    Result Date: 7/6/2019 7/6/2019 11:39 AM HISTORY/REASON FOR EXAM:  Tumor. Hx contrast allergy. If possible, internal external drain. Failed ERCP x 2. Both ERCP attempts  were met with firm obstruction at the ampulla. Biliary obstruction, obstructive jaundice. TECHNIQUE/EXAM DESCRIPTION AND NUMBER OF VIEWS: 1.  Left Percutaneous transhepatic cholangiogram (PTC) 2.  Biliary drainage with internal-external biliary stent-catheter placement 3.  Ultrasound and fluoroscopic guidance COMPARISON:  MRCP 7/4/2019 PROCEDURE:  Case was discussed with DR. HDZ.  Informed consent was obtained. ANTIBIOTIC PROPHYLAXIS: Patient received Flagyl IV piggyback 500 mg and levofloxacin IV piggyback 500 mg prior to the procedure CONTRAST ALLERGY PROPHYLAXIS: Patient received dexamethasone yesterday and no additional steroid prep was administered. Patient received Benadryl 50 mg IV prior to any contrast injection for this procedure. (Patient experienced no adverse effects related  to contrast injection during this procedure). Moderate sedation with Fentanyl and Versed was administered during the procedure with appropriate continuous patient monitoring by the radiology nurse. SEDATION DURATION: 45 minutes CURRENT ALARA PRINCIPLES FOR DOSE REDUCTION TECHNIQUES WERE UTILIZED FOR THIS EXAMINATION. FLUOROSCOPY TIME: 10.9 minutes NUMBER OF FILMS: 11 fluoroscopic frame capture images and/or digital series obtained. CONTRAST AMOUNT: 40 mL Omnipaque Localizing ultrasound images were obtained. The Left upper quadrant and epigastrium was prepped and draped in a sterile manner. Following local anesthesia with 7 cc 1% lidocaine, a 21-gauge needle was advanced into the liver. A transhepatic cholangiogram  was performed. A 0.018 guidewire was then placed and access converted to an AccuStick kit. Additional cholangiography was performed through the AccuStick catheter demonstrating abrupt occlusion at the distal common duct. Next, using an angled Glidewire in combination with a 4 Arabic Kumpe catheter, access was obtained beyond the common duct into the duodenum. Contrast injection confirms catheter tip position within the  duodenum. An Amplatz guidewire was placed. Tract dilatation was performed up to 8 Latvian. Next, an 8 Latvian Meditec locking loop internal- external biliary stent-catheteran 8-Latvian locking loop Meditech pigtail catheter was placed. A final cholangiogram was performed documenting satisfactory position of all catheter side holes with no extravasation. A bile specimen of 1 mL was collected and will be submitted for culture and sensitivity, Gram stain, and cell count. The bile appearance was clear, allan. The catheter was secured to the skin and connected to gravity drainage. The patient tolerated the procedure well with no evidence of complication. FINDINGS: There is satisfactory catheter position with no extravasation. Moderate dilatation of intrahepatic and extrahepatic biliary ducts. Abrupt occlusion at the level of the distal common duct. Patent cystic duct with prompt filling of the gallbladder during injection of contrast in the common hepatic duct.     1. Ultrasound and fluoroscopic guided percutaneous transhepatic cholangiogram demonstrating distal common bile duct occlusion concordant with ERCP findings. Moderate dilatation of intrahepatic and extrahepatic biliary ducts. 2. Percutaneous biliary drainage with placement of an 8 Latvian Meditec locking loop internal-external biliary stent-catheter. The catheter is connected to gravity drainage at this time. When hyperbilirubinemia resolves, the catheter can be capped to allow antegrade physiologic bile drainage. The distal common duct obstruction may be amenable to metallic self-expanding internal stent placement by antegrade technique or rendezvous endoscopic technique.       Micro:  Results     ** No results found for the last 168 hours. **          Assessment:  Active Hospital Problems    Diagnosis   • Obstructive jaundice [K83.8]   • Elevated LFTs [R94.5]   • Severe protein-calorie malnutrition (HCC) [E43]   • HTN (hypertension) [I10]   • Dyslipidemia [E78.5]         ASSESSMENT/PLAN:      Katelyn Hill is a 84 y.o.  admitted 7/2/2019. Pt has a past medical history  hyperlipidemia and hypertension who was admitted due to right upper quadrant and epigastric pain as well as jaundice along with dark urine and pale bowel movements.  Due to this she presented to the ED.      Hospital Course:   She has been afebrile.  No leukocytosis.  Labs on admission with elevated AST-ALT as well as significantly elevated alk phos and total bilirubin.  Right upper quadrant ultrasound with intrahepatic biliary dilation and mildly dilated common bile duct, gallbladder sludge but no pericholecystic fluid to suggest cholecystitis she was diagnosed with obstructive jaundice.  GI was consulted and an MRCP was performed.  This was concerning for pancreatic head mass as well as extrahepatic biliary dilation with abrupt narrowing of the common bile duct at the pancreatic head.  No gallstones were noted.  She is had 2 ERCPs but they have been unsuccessful.  She had an unsuccessful ERCP.  She was taken to the OR on 7/3 for a percutaneous transplant cholangiogram, biliary stent was placed and bile cultures were obtained.  Viral hepatitis panel is positive for hepatitis B surface antigen and negative hepatitis B core antibody. She is had steady improvement in AST and ALT as well as alk phos and total bilirubin.  Pt went for ERCP on 7/10 and preliminary path consistent with malignancy.      Interval 24 hour assessment:    Events, EUS yesterday at [facility]  AF, O2 RA,    Labs reviewed  Studies reviewed  Micro reviewed    Pt continued on fluconazole, levofloxacin and Flagyl. She reports less abdomial pain and is anticipating further procedure today. Plan ton internalize the drain by IR.     Obstructive jaundice, may be due to pancreatic mass  -Unsuccessful ERCP x3  -IR has placed a percutaneous biliary tube for drainage  -MR with several irregular cystic structures in the pancreatic head without  discrete mass identified  -CA 19-9 elevated  - 7/10 EUS with biopsy revealed a 24 x 19mm hypoechoic mass with possible portal venous invasion. 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  Unsuccessful ERCP and attempted Rendezvous for internalization of the biliary drain.      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  Reviewed literature on presence of bacteria in bile and significance.  There was one small study in which there was an increased risk of postprocedure complications when bacteria was present in the bile.  Due to this will treat for a short course.   --- Levofloxacin 500 mg daily, Flagyl 500 mg every 12 and fluconazole 200 mg daily- continue for ~ 7 days and assess.             Plan of care discussed with KAY Bess M.D.. Will continue to follow     Kamila Molina M.D.

## 2019-07-11 NOTE — PROGRESS NOTES
Pt went for IR guided biliary drain internalization. Her pathology is still pending today from EUS biopsy. Will await pathology. Would consult oncology and Dr Avalos for possible treatment vs surgery. The patient may be resectable but will likely need CT pancreatic protocol to fully evaluate the portal vein. If chemo is planned, patient should be started on antiviral for hepatitis B to prevent flare or worsening liver function.

## 2019-07-11 NOTE — DIETARY
Nutrition Services: RD following pt during previous admit (7/2-7/11) for adequate nutrition intake - will continue to monitor during re-admit - see original assessment 7/4    Pt is currently NPO. Supplement order in place, will receive Boost Plus BID once PO diet resumes.     Plan/Recommend:   1. Advance diet per MD once medically feasible  2. RD will continue to monitor for adequate PO intake once diet advances    RD following

## 2019-07-11 NOTE — CARE PLAN
Problem: Nutritional:  Goal: Achieve adequate nutritional intake  Patient will consume ~50% of meals and supplements.  Outcome: PROGRESSING SLOWER THAN EXPECTED    Intervention: Advance diet as tolerated  Pt is currently NPO.     RD following

## 2019-07-12 NOTE — PROGRESS NOTES
Report received by dayshift RN. Assumed care of pt. Assessment complete. Pt A&Ox4, VSS. Pt in no apparent signs of distress. Plan of care discussed for upcoming CT scans in the morning. Pt refused to place a bigger IV gauge for procedure as she wanted to sleep in early, requested to try in the morning. Call light within reach, bed in lowest position, and pt has no further questions at this time.

## 2019-07-12 NOTE — PROGRESS NOTES
Gastroenterology Progress Note     Author: Isa Cecilia   Date & Time Created: 7/12/2019 3:35 PM    Chief complaint: Obstructive Jaundice    Interval History:  Patient reports she feels well aside from persistent intermittent abdominal pain.  Her appetite is good and she is tolerating her regular diet well.  She denies and nausea, vomiting, constipation or diarrhea.  Her bowel movements are daily and formed and comfortable.  She denies blood and melena.      Review of Systems:  Review of Systems   Constitutional: Negative for fever and malaise/fatigue.   Respiratory: Negative for cough and shortness of breath.    Cardiovascular: Negative for chest pain and orthopnea.   Gastrointestinal: Positive for abdominal pain. Negative for blood in stool, constipation, diarrhea, heartburn, melena, nausea and vomiting.       Physical Exam:  Physical Exam   Constitutional: She is oriented to person, place, and time. She appears well-developed and well-nourished.   HENT:   Head: Normocephalic and atraumatic.   Eyes: Pupils are equal, round, and reactive to light. Conjunctivae are normal.   Cardiovascular: Normal rate, regular rhythm and normal heart sounds.    Pulmonary/Chest: Effort normal and breath sounds normal.   Abdominal: Soft. Bowel sounds are normal. There is tenderness (LUQ).   Musculoskeletal: Normal range of motion.   Neurological: She is alert and oriented to person, place, and time. She has normal reflexes.   Skin: Skin is warm and dry.   Psychiatric: She has a normal mood and affect. Her behavior is normal. Judgment and thought content normal.   She is ambulating with a walker at time of exam and retired to a chair for assessment.     Labs:          Recent Labs      07/12/19   0231   SODIUM  137   POTASSIUM  4.8   CHLORIDE  98   CO2  33   BUN  20   CREATININE  0.46*   CALCIUM  9.2     Recent Labs      07/10/19   0231  07/11/19   0214  07/12/19   0231   ALTSGPT  91*  81*  71*   ASTSGOT  48*  39  31    ALKPHOSPHAT  232*  229*  205*   TBILIRUBIN  5.3*  5.1*  4.4*   DBILIRUBIN  2.5*  2.8*   --    GLUCOSE   --    --   142*     Recent Labs      07/10/19   0231  07/11/19   0214  07/12/19   0231   RBC  3.53*  3.61*  3.66*   HEMOGLOBIN  11.1*  11.3*  11.7*   HEMATOCRIT  34.5*  35.5*  35.8*   PLATELETCT  235  273  267     Recent Labs      07/10/19   0231  07/11/19   0214  07/12/19   0231   WBC  6.0  7.6  7.5   NEUTSPOLYS  71.30  81.50*  89.30*   LYMPHOCYTES  13.50*  7.10*  4.80*   MONOCYTES  12.00  10.90  5.70   EOSINOPHILS  2.70  0.10  0.00   BASOPHILS  0.30  0.10  0.10   ASTSGOT  48*  39  31   ALTSGPT  91*  81*  71*   ALKPHOSPHAT  232*  229*  205*   TBILIRUBIN  5.3*  5.1*  4.4*     Hemodynamics:  Temp (24hrs), Av.7 °C (98.1 °F), Min:36.4 °C (97.6 °F), Max:37.1 °C (98.8 °F)  Temperature: 36.9 °C (98.5 °F)  Pulse  Av.1  Min: 60  Max: 99   Blood Pressure : (!) 168/81 (nurse aware)     Respiratory:    Respiration: 16, Pulse Oximetry: 98 %        RUL Breath Sounds: Clear, RML Breath Sounds: Clear, RLL Breath Sounds: Clear, LEVAR Breath Sounds: Clear, LLL Breath Sounds: Clear  Fluids:    Intake/Output Summary (Last 24 hours) at 19 1535  Last data filed at 19 1000   Gross per 24 hour   Intake              728 ml   Output                0 ml   Net              728 ml        GI/Nutrition:  Orders Placed This Encounter   Procedures   • Diet Order Low Fiber(GI Soft), Hepatic     Standing Status:   Standing     Number of Occurrences:   1     Order Specific Question:   Diet:     Answer:   Low Fiber(GI Soft) [2]     Order Specific Question:   Diet:     Answer:   Hepatic [9]     Medical Decision Making, by Problem:  Active Hospital Problems    Diagnosis   • Obstructive jaundice [K83.8]   • Elevated LFTs [R94.5]   • Severe protein-calorie malnutrition (HCC) [E43]   • HTN (hypertension) [I10]   • Dyslipidemia [E78.5]       Plan:  Patient post IR guided biliary drain internalization, we continue to await pathology  from EUS biopsy. Dr Avalos recommends outpatient surgical consideration once pathology returns and pancreatic protocol is complete for possible treatment vs surgery. Ultimately if chemo is planned, patient should be started on antiviral for hepatitis B to prevent flare or worsening liver function.    This plan was discussed with Dr. Horace Forbes.   Quality-Core Measures

## 2019-07-12 NOTE — THERAPY
"Physical Therapy Evaluation completed.   Bed Mobility:  Supine to Sit: Contact Guard Assist (hand up from therapist)  Transfers: Sit to Stand: Supervised  Gait: Level Of Assist: Minimal Assist (CGA) with Front-Wheel Walker       Plan of Care: Will benefit from Physical Therapy 3 times per week  Discharge Recommendations: Equipment: 4-Wheel Walker. Post-acute therapy Discharge to a transitional care facility for continued skilled therapy services.    Patient presents with generalized weakness and difficulty ambulating due to abdominal pain. Patient required several standing rest breaks during session due to abdominal pain. Acute PT indicated to progress mobility and increase functional activity tolerance. D/c pending progress and resources available upon d/c. At this time recommend post acute services, per objective findings, and due to patient living alone, no social support.     Patient would benefit from use of a 4WW for fall prevention and increased independence.     See \"Rehab Therapy-Acute\" Patient Summary Report for complete documentation.     "

## 2019-07-12 NOTE — CONSULTS
DATE OF SERVICE:  07/11/2019    HISTORY OF PRESENT ILLNESS:  The patient is an 84-year-old female patient who   recently was admitted to the hospital and seen from the emergency room by Dr. Kathy Jones, a surgeon, with 2-week history of worsening epigastric pain, nausea   and vomiting, noticed that her urine was turning dark and her stools were   turning light.  She had 4 days prior to the admission, the pain started.  She   was admitted to the hospital.  She did feel as though she was having some   dysphagia with difficulty eating.  She has had about a 45-pound weight loss   over the last several years.  She is a fairly high functioning 84-year-old   with an ECOG performance status of 0.  Upon admission to the hospital, she   underwent a workup, which included labs, which did show that the patient was   jaundiced.  Her admitting labs show that her bilirubin was slowly creeping up   and on the 03/07/2019, she had a bilirubin of 14.  It looks like her admitting   was 14.5 on the 07/02/2019.  She was seen by Dr. Jones on the 6th and on the   7th, her bilirubin started coming down.  She did have an EUS done by Dr. Melton and the pathology is pending at this point, but does appear to be   malignant.  She did have an MRI, which did show a dilated common hepatic,   intra and extrahepatic ducts with a cutoff in the head of the pancreas as well   as a slightly dilated pancreatic duct suspicious for a lesion with some   cystic masses, possibly mucinous tumor in the head of the pancreas.  In any   event, the patient was admitted to the hospital, had an MRI due to the fact   that she has an IODINE ALLERGY, which revealed the above.  There is no sign of   metastatic disease in the liver, but the pancreas did appear to be atrophic   in the body and tail consistent with an obstruction distally.    PAST MEDICAL HISTORY:  Significant for hypertension, dyslipidemia, kidney   stones, arthritis, and Raynaud's disease.    PAST  SURGICAL HISTORY:  She has had an appendectomy in the past, rotator cuff   surgery, tonsillectomy, and knee arthroscopy.     MEDICATIONS:  She does not take any medication.    ALLERGIES:  SHE HAS ALLERGIES TO ASPIRIN, IODINE, ROBITUSSIN, PENICILLIN,   SHELLFISH, CLINDAMYCIN, TETANUS, AND AZITHROMYCIN.    SOCIAL HISTORY:  She is a former smoker, quit 30 years ago.  Denies any   alcohol or illicit drug use.  She denies any history of cirrhosis.    FAMILY HISTORY:  Positive for non-GI cancers, lung cancer in her maternal   grandmother, heart disease in her mother and father, but no sign of pancreatic   or hepatobiliary disease.    REVIEW OF SYSTEMS:  The patient denies any chest pain, shortness of breath,   palpitations, upper or lower GI bleeds, but does complain of some epigastric   pain since the stent was placed.  The patient was able to tolerate a general   diet.  All other review systems are negative.    LABORATORY DATA:  Today reveal a CBC shows H and H of 11.3 and 35.5 with   platelet count 273 and a white count of 7.6.  Her bilirubin is down to 2.8 at   this time.  All her LFTs are trending down.  The rest of her electrolytes are   normal based on the 9th labs.  She did have a CA 19-9 done, which was done on   the 5th when she was obstructed and subsequently came back at 849, but this   could be artificially high due to the biliary obstruction and pancreatic duct   obstruction.  She was positive for hepatitis B surface antigen as well and   core antigen was reactive as well.    PHYSICAL EXAMINATION:  HEENT:  Extraocular movements are intact.  It appears as though she is   slightly jaundiced in her eyes.  NECK:  There is no cervical, supraclavicular, or occipital lymphadenopathy.  LUNGS:  Clear to auscultation.  Good inspiratory effort.  CARDIOVASCULAR:  Regular rhythm.  ABDOMEN:  Soft.  She does have discomfort in the epigastric region consistent   with possibly the stent placement or post ERCP/EUS biopsy,  pancreatitis, but   she is able to tolerate a general diet.  Has no nausea, vomiting, no   peritoneal signs, rebound or guarding.  EXTREMITIES:  No clubbing, cyanosis or edema.  PELVIC RECTAL:  Deferred for obvious reasons of her present complaint from the   pancreatic malignancy.    ASSESSMENT AND PLAN:  I have reviewed the MRI that has done.  I have requested   that the patient get a pancreas protocol CT with premedication if possible.    If not, we will discuss the EUS report with Dr. Melton to see how ____.  Dr. Sean Nguyen attempted the ERCP on the , but was unable to place a stent and   so she does have the internalized removable walled metal stent by   interventional radiology, which was placed today.  I personally reviewed the   patient's MRI from the .  Subsequent to this findin.  I am recommending if we could possibly get an outpatient endoscopic   ultrasound.  2.  Possibly if we can do a pancreas protocol CT scan with premedication would   be extremely helpful to see if the patient is a resectable candidate or not.  3.  The patient's family and the patient, I am requesting that they see me in   the office as an outpatient in order to discuss the results of her pathology   because the pathology is still pending.  In any event, the concern is that she   may have a high-grade malignancy or high-grade dysplasia with carcinoma in   situ in an IPMN causing the obstruction.  It is difficult to assess whether   there is vascular involvement or abutment on the MRI.       ____________________________________     MD LUCIA Mccarthy / HELADIO    DD:  2019 14:52:50  DT:  2019 18:49:26    D#:  [id number]  Job#:  [id number]    cc: Braxton Melton MD, Sean Nguyen DO

## 2019-07-12 NOTE — PROGRESS NOTES
After patient returned from IR, patient was groggy, argumentative, confused, unsteady, requiring close supervision from nursing staff and bed alarm to be placed.  After family members arrived and medications wore off, pt was back to baseline, apologetic, friendly, involved in care.  Updated pt and family on plan moving forward as well as timeline as far as upcoming procedures.  All questions answered at this time

## 2019-07-12 NOTE — CARE PLAN
Problem: Safety  Goal: Will remain free from injury  Outcome: PROGRESSING AS EXPECTED  Fall precautions in place. Treaded socks on pt. Bedrails up. Bed in lowest position and locked.  Call light and phone within reach. Patient educated on importance of calling nurses before getting out of bed, verbalizes understanding.    Problem: Knowledge Deficit  Goal: Knowledge of disease process/condition, treatment plan, diagnostic tests, and medications will improve  Outcome: PROGRESSING AS EXPECTED  Patient educated about POC.  All questions answered in regards to disease process, treatment, medications and diet.  Patient verbalized understanding and voiced no further questions at this time.

## 2019-07-12 NOTE — PROGRESS NOTES
Hospital Medicine Daily Progress Note    Date of Service  7/12/2019    Chief Complaint  84 y.o. female admitted 7/10/2019 with Obstructive jaundice    Hospital Course    PMH HTN and hyperlipidemia presented with obstructive jaundice.  Patient successfully had EUS prior to admission and the preliminary results showing malignancy.  Oncologist and pancreatic surgeon both were consulted.        Interval Problem Update  7/11.  Patient has no acute event.  Patient tolerated IR internalization of biliary stent today very well without complications.  Patient still complains of mild abdominal pain. Patient's pain is local 3-4/10, intermittent and does not radiate to other location, sharp and with some tingling. Can be controlled by pain meds.   7/12.  Patient is pleasant overnight no acute overnight adverse event.  Patient's abdominal pain is mild.  Patient was seen by surgeon yesterday and recommend outpatient follow-up.  Still pending CT pancreas protocol to be finished.  However patient does have iodine or contrast allergy.  Needs to further clarify patient's allergic history from patient's family. Patient otherwise denies fever, chills, nausea, vomiting, SOB, CP, headache, constipation, diarrhea, cough, or sputum.      Consultants/Specialty  GI  Pancreatic surgery  Oncologist  ID    Code Status  Full ocde    Disposition  TBD pending PT OT    Review of Systems  Review of Systems   Constitutional: Positive for weight loss. Negative for chills and fever.   HENT: Negative for congestion, ear pain and hearing loss.    Eyes: Negative for double vision and pain.   Respiratory: Negative for cough, sputum production and wheezing.    Cardiovascular: Negative for chest pain, palpitations, leg swelling and PND.   Gastrointestinal: Negative for abdominal pain, constipation and nausea.   Genitourinary: Negative for dysuria, frequency and hematuria.   Musculoskeletal: Negative for back pain, falls and neck pain.   Skin: Negative for  rash.   Neurological: Positive for weakness. Negative for dizziness and headaches.   Psychiatric/Behavioral: Negative for depression, substance abuse and suicidal ideas.        Physical Exam  Temp:  [36.4 °C (97.6 °F)-37.1 °C (98.8 °F)] 36.9 °C (98.5 °F)  Pulse:  [67-79] 67  Resp:  [16-18] 16  BP: (132-168)/(63-81) 168/81  SpO2:  [91 %-98 %] 98 %    Physical Exam   Constitutional: She is oriented to person, place, and time. She appears well-developed and well-nourished. No distress.   HENT:   Head: Normocephalic.   Nose: Nose normal.   Mouth/Throat: No oropharyngeal exudate.   Eyes: Pupils are equal, round, and reactive to light. Conjunctivae and EOM are normal.   Neck: Normal range of motion. Neck supple. No JVD present. No thyromegaly present.   Cardiovascular: Normal rate, regular rhythm and normal heart sounds.  Exam reveals no gallop and no friction rub.    No murmur heard.  Pulmonary/Chest: Effort normal and breath sounds normal. No respiratory distress. She has no rales.   Abdominal: Soft. Bowel sounds are normal. She exhibits no distension and no mass. There is tenderness. There is no rebound.   Musculoskeletal: Normal range of motion. She exhibits no edema.   Lymphadenopathy:     She has no cervical adenopathy.   Neurological: She is alert and oriented to person, place, and time. No cranial nerve deficit.   Skin: Skin is warm. No rash noted. She is not diaphoretic.   Psychiatric: Her behavior is normal.       Fluids    Intake/Output Summary (Last 24 hours) at 07/12/19 1425  Last data filed at 07/12/19 1000   Gross per 24 hour   Intake              728 ml   Output                0 ml   Net              728 ml       Laboratory  Recent Labs      07/10/19   0231  07/11/19   0214  07/12/19   0231   WBC  6.0  7.6  7.5   RBC  3.53*  3.61*  3.66*   HEMOGLOBIN  11.1*  11.3*  11.7*   HEMATOCRIT  34.5*  35.5*  35.8*   MCV  97.7  98.3*  97.8   MCH  31.4  31.3  32.0   MCHC  32.2*  31.8*  32.7*   RDW  54.9*  54.4*  54.9*    PLATELETCT  235  273  267   MPV  10.2  10.2  10.0     Recent Labs      07/12/19   0231   SODIUM  137   POTASSIUM  4.8   CHLORIDE  98   CO2  33   GLUCOSE  142*   BUN  20   CREATININE  0.46*   CALCIUM  9.2                   Imaging  IR-PERQ  CHOLANGIOGRAM NEW (INCLUDES ALL RADIOLOGY)   Final Result      Successful internalization of the biliary stent with placement of a removable metallic partially covered 10 mm x 60 mm wall flex stent.         CT-PANCREAS AND ABDOMEN WITH & W/O    (Results Pending)        Assessment/Plan  Obstructive jaundice- (present on admission)   Assessment & Plan    - with right upper quadrant pain and jaundice.  Ultrasound showed mildly dilated common bile duct and intrahepatic biliary dilation.  MRCP showed intrahepatic and extrahepatic biliary dilation with abrupt narrowing of the common bile duct at the pancreatic head with findings concerning for pancreatic head mass, with distended gallbladder, with no stone demonstrated in the gallbladder or biliary tree. No evidence of acute cholecystitis on ultrasound. No signs of cholangitis. 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  No discrete pancreatic mass seen on MRI.  -failed attempts at ERCP x2. Now has percutaneous biliary drainage.   -GI planning for EUS during this hospitalization.  Awaiting arrangements to transfer to [facility] for EUS.   -Will need outpatient referral/follow-up with hepatobiliary surgery (Dr. Pat).  -continue to trend LFTs.  -Continue symptom control with IV morphine PRN, and PRN oxycodone. Continue antiemetics    -Patient had EUS and preliminary results showing malignancy  -I consulted oncologist Dr. Boyer and pancreatic surgeon Dr. Avalos as recommended by GI.  -I started patient on fluconazole, Levaquin, Flagyl as recommended per ID.    -- patient had IR internalization of biliary stent 7/11 with good success  I ordered CT pancreatic protocol as requested by  Dr. Avalos  Pending further recommendation.  Pending path result. Pending CT pancreatic protocol.   Awaiting for SNF placement. Need PT OT re-eval     Elevated LFTs- (present on admission)   Assessment & Plan    - Secondary to biliary obstruction of unknown etiology. Further work-up as above.  Status post EUS 7/10  Pending pathology report  Trending down, improving, tbil cont down  Tbil 8->6     Severe protein-calorie malnutrition (HCC)- (present on admission)   Assessment & Plan    Poor nutrition status.  Continue supplement  I continue dietitian consultation.     HTN (hypertension)- (present on admission)   Assessment & Plan    Blood pressure stable  Continue home medication       Dyslipidemia- (present on admission)   Assessment & Plan    Patient not on home medication and repeat lipid fell showing .  Given patient abnormal liver function, reasonable to continue hold off statin          VTE prophylaxis: lovenox      Current Facility-Administered Medications:   •  enoxaparin (LOVENOX) inj 30 mg, 30 mg, Subcutaneous, DAILY, Earlene Bess M.D., 30 mg at 07/11/19 4936  •  ondansetron (ZOFRAN ODT) dispertab 4 mg, 4 mg, Oral, Q4HRS PRN, Earlene Bess M.D.  •  ondansetron (ZOFRAN) syringe/vial injection 4 mg, 4 mg, Intravenous, Q4HRS PRN, Earlene Bess M.D.  •  senna-docusate (PERICOLACE or SENOKOT S) 8.6-50 MG per tablet 2 Tab, 2 Tab, Oral, BID **AND** polyethylene glycol/lytes (MIRALAX) PACKET 1 Packet, 1 Packet, Oral, QDAY PRN **AND** magnesium hydroxide (MILK OF MAGNESIA) suspension 30 mL, 30 mL, Oral, QDAY PRN **AND** bisacodyl (DULCOLAX) suppository 10 mg, 10 mg, Rectal, QDAY PRN, Earlene Bess M.D.  •  lisinopril (PRINIVIL) tablet 5 mg, 5 mg, Oral, DAILY, Earlene Bess M.D., 5 mg at 07/12/19 1568  •  morphine (pf) 4 mg/ml injection 1-2 mg, 1-2 mg, Intravenous, Q3HRS PRN, Earlene Bess M.D.  •  oxyCODONE immediate-release (ROXICODONE) tablet 5-10 mg, 5-10 mg, Oral, Q4HRS PRN, Earlene Bess M.D., 10 mg at 07/12/19 1415  •   levoFLOXacin (LEVAQUIN) tablet 500 mg, 500 mg, Oral, DAILY, Kamila Molina M.D., 500 mg at 07/12/19 0452  •  fluconazole (DIFLUCAN) tablet 200 mg, 200 mg, Oral, DAILY, Kamila Molina M.D., 200 mg at 07/12/19 5159  •  metroNIDAZOLE (FLAGYL) tablet 500 mg, 500 mg, Oral, Q12HRS, Kamila Molina M.D., 500 mg at 07/12/19 3188

## 2019-07-12 NOTE — THERAPY
"Occupational Therapy Evaluation completed.   Functional Status:  SPV toileting, LB dressing, standing grooming, functional mobility w/ FWW  Plan of Care: Will benefit from Occupational Therapy 2 times per week  Discharge Recommendations:  Equipment: Tub Transfer Bench. Post-acute therapy Pt. Would benefit from 24/7 SPV upon DC to assist w/ higher level IADLs and overall safety. If this cannot be provided recommend post acute placement to increase pt's activity tolerance required for BADLs and IADL participation and completion.    See \"Rehab Therapy-Acute\" Patient Summary Report for complete documentation.    Pt is a 85 y/o female who presents to acute due to obstructive jaundice. Pt lives alone in a senior apartment w/ 2 adult sons in the area, she reports she does not want to ask them for help. Pt primarily limited by abdominal pain and activity tolerance. Pt donned/doffed underpants, performed toilet txf, and performed standing grooming at SPV level. Pt will likely require increased assist for IADLs at this time. Will follow for Acute OT services. Pt reports she does not feel safe to DC home alone, recommend DC home w/ 24/7 SPV, if this is unobtainable recommend post acute placement.     "

## 2019-07-12 NOTE — PROGRESS NOTES
Infectious Disease Progress Note    Author: Kamila Molina M.D. Date & Time of service: 2019  8:10 AM    Chief Complaint:  Bactiibilia      Interval History:  7/10 Interval 24 hours of Vitals/Labs/Micro,and imaging results reviewed as available. See assessment.       Review of Systems:  Review of Systems   Constitutional: Positive for weight loss. Negative for chills and fever.   Gastrointestinal: Positive for abdominal pain. Negative for constipation, diarrhea, nausea and vomiting.   Neurological: Positive for weakness.       Hemodynamics:  Temp (24hrs), Av.7 °C (98 °F), Min:36.4 °C (97.6 °F), Max:37.1 °C (98.8 °F)  Temperature: 36.4 °C (97.6 °F)  Pulse  Av.3  Min: 60  Max: 99Heart Rate (Monitored): 67  Blood Pressure : 147/70, NIBP: (!) 170/77       Physical Exam:  Physical Exam   Constitutional: She is oriented to person, place, and time. She appears well-developed and well-nourished.   HENT:   Head: Normocephalic and atraumatic.   Eyes: Pupils are equal, round, and reactive to light. Conjunctivae and EOM are normal.   Cardiovascular: Normal rate, regular rhythm and normal heart sounds.    Pulmonary/Chest: Effort normal.   Decreased breath sounds in bases, poor inspiratory effort   Abdominal: Soft. She exhibits no distension. There is tenderness. There is no rebound and no guarding.   Musculoskeletal: Normal range of motion. She exhibits no edema.   Neurological: She is alert and oriented to person, place, and time.   Skin: Skin is warm and dry.   Psychiatric: She has a normal mood and affect. Her behavior is normal.       Meds:    Current Facility-Administered Medications:   •  enoxaparin (LOVENOX) injection  •  ondansetron  •  ondansetron  •  senna-docusate **AND** polyethylene glycol/lytes **AND** magnesium hydroxide **AND** bisacodyl  •  lisinopril  •  morphine injection  •  oxyCODONE immediate-release  •  levoFLOXacin  •  fluconazole  •  metroNIDAZOLE    Labs:  Recent Labs      07/10/19    0231 07/11/19   0214  07/12/19   0231   WBC  6.0  7.6  7.5   RBC  3.53*  3.61*  3.66*   HEMOGLOBIN  11.1*  11.3*  11.7*   HEMATOCRIT  34.5*  35.5*  35.8*   MCV  97.7  98.3*  97.8   MCH  31.4  31.3  32.0   RDW  54.9*  54.4*  54.9*   PLATELETCT  235  273  267   MPV  10.2  10.2  10.0   NEUTSPOLYS  71.30  81.50*  89.30*   LYMPHOCYTES  13.50*  7.10*  4.80*   MONOCYTES  12.00  10.90  5.70   EOSINOPHILS  2.70  0.10  0.00   BASOPHILS  0.30  0.10  0.10     Recent Labs      07/12/19 0231   SODIUM  137   POTASSIUM  4.8   CHLORIDE  98   CO2  33   GLUCOSE  142*   BUN  20     Recent Labs      07/10/19   0231  07/11/19   0214  07/12/19   0231   ALBUMIN  3.1*  3.4  3.7   TBILIRUBIN  5.3*  5.1*  4.4*   ALKPHOSPHAT  232*  229*  205*   TOTPROTEIN  5.4*  5.9*  6.0   ALTSGPT  91*  81*  71*   ASTSGOT  48*  39  31   CREATININE   --    --   0.46*       Imaging:  Dx-chest-portable (1 View)    Result Date: 7/2/2019 7/2/2019 2:34 PM HISTORY/REASON FOR EXAM:  jaundice. TECHNIQUE/EXAM DESCRIPTION AND NUMBER OF VIEWS: Single portable view of the chest. COMPARISON: 5/8/2019 FINDINGS: The heart is not enlarged. Atherosclerotic calcification is seen.  No focal consolidation, pleural effusion or pneumothorax is identified.  Costophrenic angles are clear. There is a calcified granuloma in the liver.     No acute cardiopulmonary process is seen. Atherosclerotic plaque.    [id number]-[id number]    Result Date: 7/3/2019  7/3/2019 10:23 AM HISTORY/REASON FOR EXAM:  Cholelithiasis. Jaundice, abdominal pain. TECHNIQUE/EXAM DESCRIPTION: Magnetic resonance cholangiopancreatography. Magnetic resonance cholangiopancreatography was performed on with axial, coronal, and sagittal thin and thick section heavily T2-weighted sequences. 3-D cholangiographic multiplanar maximum intensity projection (MIP) images were created on a workstation.. The study was performed on a [employer] 1.5 Beverley MRI scanner. COMPARISON: Rubber quadrant ultrasound 7/3/2019 FINDINGS: Liver shows  intrahepatic biliary dilation. Common bile duct measures 10 mm in diameter.  Abrupt narrowing at the pancreatic head. T1 hypointensity in the pancreatic head concerning for mass. Configuration of pancreatic duct suggests pancreas divisum. Gallbladder is distended. No signal void to indicate stone. T2 hyperintense lesions in both kidneys likely indicating cysts. Adrenal glands are unremarkable.     1.  Intrahepatic and extrahepatic biliary dilation with abrupt narrowing of the common bile duct at the pancreatic head with findings concerning for pancreatic head mass. 2.  Distended gallbladder. 3.  No stone demonstrated in the gallbladder or biliary tree. 4.  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    Mr-abdomen-with & W/o    Result Date: 7/5/2019 7/4/2019 1:38 PM HISTORY/REASON FOR EXAM:  Biliary obstruction. TECHNIQUE/EXAM DESCRIPTION: MRI of the pancreas with dynamic IV gadolinium enhancement. MR imaging of the pancreas was performed.  MR images of the pancreas were obtained with coronal and axial single-shot fast spin-echo T2, fat-suppressed axial FRFSE T2, axial in-phase and out-of-phase FSPGR T1, axial DWI with a b-value of 600, 3D MRCP,  precontrast fat-suppressed FSPGR T1, dynamic gadolinium enhanced axial fat-suppressed T1 FSPGR in the arterial dominant, portal venous, 2-minute and 4-minute delayed phases, with delayed coronal fat-suppressed T1 FSPGR sequence. . The study was performed on a "[employer]a 1.5 Beverley MRI scanner. 5 mL Gadavist contrast was administered intravenously. COMPARISON: MRCP 7/3/2019 and ultrasound 7/2/2019 FINDINGS: Liver: Homogeneous enhancement. A calcification is in the medial left hepatic lobe as seen on prior CT. No solid mass is identified. Gallbladder and biliary system: There is moderate dilatation of the intrahepatic ducts. The common bile duct is dilated to approximately 8 mm. The gallbladder is distended and contains layering sludge. There is a low posterior insertion of the cystic duct.  Pancreas: The pancreas is somewhat atrophic. The pancreatic duct in the mid to distal body measures approximately 3.5 mm. There are dilated side branches. The minor pancreatic duct is patent without evidence of obstruction. There are multiple irregular cystic structures are in the pancreatic head measuring less than a centimeter. No discrete infiltrative solid mass is appreciated. Vasculature: The hepatic, portal, superior mesenteric, and splenic veins are patent. Origins of the celiac and superior mesenteric veins are patent. No definite vascular encasement is appreciated. No vessel attenuation. There is ectasia of the aorta with  atherosclerotic plaque. Spleen: Normal. Kidneys: The kidneys enhance symmetrically. There are bilateral renal cysts. No hydronephrosis is identified. Adrenal glands: Unremarkable. Ascites: No ascites. Lymph nodes: No adenopathy.     1.  Intrahepatic and extrahepatic ductal dilatation, consistent with known biliary obstruction. Gallbladder distention and sludge is likely related to obstruction. 2.  Several irregular cystic structures in the pancreatic head without a discrete infiltrative pancreatic head mass identified. Moderately dilated main pancreatic duct with dilated side branches. No definite vascular encasement identified. If clinically indicated, pancreatic protocol CT may be helpful for further evaluation. 3.  Atherosclerosis. 4.  Bilateral renal cysts.     Us-ruq    Result Date: 7/2/2019 7/2/2019 2:39 PM HISTORY/REASON FOR EXAM:  Jaundice TECHNIQUE/EXAM DESCRIPTION AND NUMBER OF VIEWS:  Real-time sonography of the liver and biliary tree. COMPARISON: CT dated 11/2/2017 FINDINGS: The liver measures 13.32 cm. The liver is normal in contour. There is no evidence of solid mass lesion. Calcification in the left hepatic lobe. Intrahepatic biliary dilation is present. The gallbladder is normal in size. There is no evidence of cholelithiasis. Gallbladder sludge. The gallbladder wall  thickness measures 0.31 cm. There is no pericholecystic fluid. The common duct measures 0.93 cm. The visualized pancreas is unremarkable. The visualized aorta is normal in caliber. Atherosclerosis. Intrahepatic IVC is patent. The portal vein is patent with hepatopetal flow. The MPV measures 0.9 cm. The right kidney measures 10.40 cm. Simple right renal cyst. There is no ascites.     1.  Intrahepatic biliary dilatation with mildly dilated CBD. This may be due to CBD stone, stricture or mass. 2.  Gallbladder sludge with borderline thickened gallbladder wall. No gallstones identified. No pericholecystic fluid to suggest cholecystitis. 3.  Simple right renal cyst.    Dx-portable Fluoro > 1 Hour    Result Date: 7/3/2019  7/3/2019 2:30 PM HISTORY/REASON FOR EXAM:  Intraoperative images for procedural navigation. TECHNIQUE/EXAM DESCRIPTION AND NUMBER OF VIEWS:  24 view(s) of the upper abdomen.     Fluoroscopic image(s) obtained during ERCP. Please see the patient's chart for full procedural details. Fluoroscopy time 2.6 minutes.     Dx-portable Fluoroscopy < 1 Hour    Result Date: 7/10/2019  7/10/2019 9:10 AM HISTORY/REASON FOR EXAM:  Endoscopic cholangiopancreatography, Lois Fraser TECHNIQUE/EXAM DESCRIPTION AND NUMBER OF VIEWS: Portable fluoroscopy for less than one hour FINDINGS:      The portable fluoroscopy unit was obligated to the procedure for less than one hour.   Actual fluoro time was 6 minutes.     Portable fluoroscopy utilized for 6 minutes.    Dx-portable Fluoroscopy < 1 Hour    Result Date: 7/8/2019 7/5/2019 2:35 PM HISTORY/REASON FOR EXAM:  Unsuccessful attempt at ERCP, Main OR TECHNIQUE/EXAM DESCRIPTION AND NUMBER OF VIEWS: Portable fluoroscopy for less than one hour FINDINGS:      The portable fluoroscopy unit was obligated to the procedure for less than one hour.   Actual fluoro time was 3 seconds.     Portable fluoroscopy utilized for 3 seconds.    Ir-perq Biliary Cath Placemt Int-ext  (includes All  Radiology)    Result Date: 7/6/2019 7/6/2019 11:39 AM HISTORY/REASON FOR EXAM:  Tumor. Hx contrast allergy. If possible, internal external drain. Failed ERCP x 2. Both ERCP attempts were met with firm obstruction at the ampulla. Biliary obstruction, obstructive jaundice. TECHNIQUE/EXAM DESCRIPTION AND NUMBER OF VIEWS: 1.  Left Percutaneous transhepatic cholangiogram (PTC) 2.  Biliary drainage with internal-external biliary stent-catheter placement 3.  Ultrasound and fluoroscopic guidance COMPARISON:  MRCP 7/4/2019 PROCEDURE:  Case was discussed with DR. HDZ.  Informed consent was obtained. ANTIBIOTIC PROPHYLAXIS: Patient received Flagyl IV piggyback 500 mg and levofloxacin IV piggyback 500 mg prior to the procedure CONTRAST ALLERGY PROPHYLAXIS: Patient received dexamethasone yesterday and no additional steroid prep was administered. Patient received Benadryl 50 mg IV prior to any contrast injection for this procedure. (Patient experienced no adverse effects related  to contrast injection during this procedure). Moderate sedation with Fentanyl and Versed was administered during the procedure with appropriate continuous patient monitoring by the radiology nurse. SEDATION DURATION: 45 minutes CURRENT ALARA PRINCIPLES FOR DOSE REDUCTION TECHNIQUES WERE UTILIZED FOR THIS EXAMINATION. FLUOROSCOPY TIME: 10.9 minutes NUMBER OF FILMS: 11 fluoroscopic frame capture images and/or digital series obtained. CONTRAST AMOUNT: 40 mL Omnipaque Localizing ultrasound images were obtained. The Left upper quadrant and epigastrium was prepped and draped in a sterile manner. Following local anesthesia with 7 cc 1% lidocaine, a 21-gauge needle was advanced into the liver. A transhepatic cholangiogram  was performed. A 0.018 guidewire was then placed and access converted to an AccuStick kit. Additional cholangiography was performed through the AccuStick catheter demonstrating abrupt occlusion at the distal common duct. Next, using an  angled Glidewire in combination with a 4 Romanian Kumpe catheter, access was obtained beyond the common duct into the duodenum. Contrast injection confirms catheter tip position within the duodenum. An Amplatz guidewire was placed. Tract dilatation was performed up to 8 Romanian. Next, an 8 Romanian Meditec locking loop internal- external biliary stent-catheteran 8-Romanian locking loop Meditech pigtail catheter was placed. A final cholangiogram was performed documenting satisfactory position of all catheter side holes with no extravasation. A bile specimen of 1 mL was collected and will be submitted for culture and sensitivity, Gram stain, and cell count. The bile appearance was clear, allan. The catheter was secured to the skin and connected to gravity drainage. The patient tolerated the procedure well with no evidence of complication. FINDINGS: There is satisfactory catheter position with no extravasation. Moderate dilatation of intrahepatic and extrahepatic biliary ducts. Abrupt occlusion at the level of the distal common duct. Patent cystic duct with prompt filling of the gallbladder during injection of contrast in the common hepatic duct.     1. Ultrasound and fluoroscopic guided percutaneous transhepatic cholangiogram demonstrating distal common bile duct occlusion concordant with ERCP findings. Moderate dilatation of intrahepatic and extrahepatic biliary ducts. 2. Percutaneous biliary drainage with placement of an 8 Romanian Meditec locking loop internal-external biliary stent-catheter. The catheter is connected to gravity drainage at this time. When hyperbilirubinemia resolves, the catheter can be capped to allow antegrade physiologic bile drainage. The distal common duct obstruction may be amenable to metallic self-expanding internal stent placement by antegrade technique or rendezvous endoscopic technique.       Micro:  Results     ** No results found for the last 168 hours. **          Assessment:  Active Hospital  Problems    Diagnosis   • Obstructive jaundice [K83.8]   • Elevated LFTs [R94.5]   • Severe protein-calorie malnutrition (HCC) [E43]   • HTN (hypertension) [I10]   • Dyslipidemia [E78.5]        ASSESSMENT/PLAN:      Katelyn Hill is a 84 y.o.  admitted 7/2/2019. Pt has a past medical history  hyperlipidemia and hypertension who was admitted due to right upper quadrant and epigastric pain as well as jaundice along with dark urine and pale bowel movements.  Due to this she presented to the ED.      Hospital Course:   She has been afebrile.  No leukocytosis.  Labs on admission with elevated AST-ALT as well as significantly elevated alk phos and total bilirubin.  Right upper quadrant ultrasound with intrahepatic biliary dilation and mildly dilated common bile duct, gallbladder sludge but no pericholecystic fluid to suggest cholecystitis she was diagnosed with obstructive jaundice.  GI was consulted and an MRCP was performed.  This was concerning for pancreatic head mass as well as extrahepatic biliary dilation with abrupt narrowing of the common bile duct at the pancreatic head.  No gallstones were noted.  She is had 2 ERCPs but they have been unsuccessful.  She had an unsuccessful ERCP.  She was taken to the OR on 7/3 for a percutaneous transplant cholangiogram, biliary stent was placed and bile cultures were obtained.  Viral hepatitis panel is positive for hepatitis B surface antigen and negative hepatitis B core antibody. She is had steady improvement in AST and ALT as well as alk phos and total bilirubin.  Pt went for ERCP on 7/10 and preliminary path consistent with malignancy.      Interval 24 hour assessment:    Events, IR procedure   AF, O2 1 L NC,    Labs reviewed  Studies reviewed  Micro reviewed    Pt continued on fluconazole, levo and flagyl.  She has some abdominal pain with deep breathing and palpation.  Otherwise she is doing well overall.       Obstructive jaundice, may be due to pancreatic  mass  -Unsuccessful ERCP x3  -IR has placed a percutaneous biliary tube for drainage  -MR with several irregular cystic structures in the pancreatic head without discrete mass identified  -CA 19-9 elevated  - 7/10 EUS with biopsy revealed a 24 x 19mm hypoechoic mass with possible portal venous invasion. 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  Unsuccessful ERCP and attempted Rendezvous for internalization of the biliary drain.   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  Reviewed literature on presence of bacteria in bile and significance.  There was one small study in which there was an increased risk of postprocedure complications when bacteria was present in the bile.  Due to this will treat for a short course.   --- Levofloxacin 500 mg daily, Flagyl 500 mg every 12 and fluconazole 200 mg daily- continue for 7 days (end 7/17/19)            Plan of care discussed with KAY Bess M.D. ID will sign off.      Kamila Molina M.D.

## 2019-07-13 NOTE — CARE PLAN
Problem: Safety  Goal: Will remain free from injury  Outcome: PROGRESSING AS EXPECTED  Fall precautions in place. Treaded socks on pt. Bedrails up. Bed in lowest position and locked.  Call light and phone within reach. Patient educated on importance of calling nurses before getting out of bed, verbalizes understanding.     Problem: Pain Management  Goal: Pain level will decrease to patient's comfort goal  Outcome: PROGRESSING AS EXPECTED  Pt states pain level to L mid abdomen is 1-2/10 which is a tolerable level for pt and declined any pain meds at this time.

## 2019-07-13 NOTE — PROGRESS NOTES
CT scan held off as patient poor historian in regards to actual allergic reaction to IV iodine.  Placed call to eldest son who is involved in care and he cannot recall what true allergy is.  Patient and family still uninformed of result from biopsy, RN did not find it appropriate to give information to patient in this setting without more information available

## 2019-07-13 NOTE — DISCHARGE PLANNING
Received [facility] form at 2242  Agency/Facility Name: [facility]. [facility]   Referral sent per [facility] form @ [phone]

## 2019-07-13 NOTE — PROGRESS NOTES
Bedside report received, pt care assumed. Assessment completed. Son at bedside. Pt denies any additional needs at this time. PT and son updated on POC. Bed in lowest position, pt refused bed alarm, pt educated on fall risk and verbalized understanding, call light within reach. Hourly rounding in place.

## 2019-07-13 NOTE — CONSULTS
DATE OF SERVICE:  07/13/2019    CONSULTATION NOTE    ADMITTING PHYSICIAN:  Earlene Bess MD    REQUESTING PHYSICIAN:  Earlene Bess MD    CONSULTING PHYSICIAN:  Radha Boyer MD    REASON FOR CONSULTATION:  Pancreatic adenocarcinoma.    HISTORY OF PRESENT ILLNESS:  The patient is an 84-year-old female who was   found to have an obstructive jaundice, initially admitted on 07/02/2019 for   workup, and she was found to have elevated bilirubin level about 14.5 and   transaminases with an alkaline phosphatase in 500 range.  Patient had a right   upper quadrant ultrasound that showed intrahepatic biliary dilatation with   mild dilated common bile duct and gallbladder sludge with borderline thickened   gallbladder consistent with cholecystitis.  She had MRCP that showed   intrahepatic and extrahepatic biliary dilatation and abrupt narrowing of the   common bile duct of the pancreatic head with findings concerning for   pancreatic head malignancy.  Patient had unsuccessful ERCP initially and she   had another MRI of the abdomen that showed several irregular cystic structures   in the pancreatic head without any discrete infiltrate and necrotic mass   identified.  Done another attempt for ERCP should the scope unable to pass the   ampulla due to mass obstructing it.  Patient had IR percutaneous drain placed   and then had internalization of the stent recently.  The biopsies from   07/10/2019 came back as invasive adenocarcinoma.  Patient was seen by Dr. Avalos few days ago and he requested to do an outpatient pancreatic   protocol to further assess if she is upfront resectable.  Oncology   consultation is obtained for further evaluation.  Patient at the present time   is doing well.  She denies having any nausea or vomiting.  She does have some   mild discomfort at the previous percutaneous drain site, but no acute pain.    She did lose weight in last couple of months.    PAST MEDICAL HISTORY:  Hypertension,  hyperlipidemia, age-related hearing loss,   age-related arthritis, kidney stones.    PAST SURGICAL HISTORY:  Tonsillectomy, appendectomy, right shoulder surgery,   knee surgery, ERCP twice, interventional radiology for percutaneous drain and   then reinternalization of the stent.    ALLERGIES:  IODIDE, ASPIRIN, CLINDAMYCIN, LACTULOSE, PENICILLIN, ROBITUSSIN,   SHELLFISH, TETANUS TOXOID, ZITHROMAX.    SOCIAL HISTORY:  Remote smoker, quit 38 years ago.  No heavy alcohol intake,   no drugs.  She is , lives by herself.  She has 2 grownup sons.    FAMILY HISTORY:  Father  at 48 due to myocardial infarction.  Mother    at 60 due to myocardial infarction.  Grandmother had a lung cancer.    She was a smoker.    REVIEW OF SYSTEMS:  I did do a 10-point system review, which is negative   except as mentioned above.  CONSTITUTIONAL:  Positive fatigue, positive weight loss.  No fevers, chills or   night sweats.  RESPIRATORY:  No cough or shortness of breath.  CARDIOVASCULAR:  No chest pain or palpitations.  GASTROINTESTINAL:  No abdominal pain, nausea or vomiting.  She has mild   discomfort at the previous drain site.  PSYCHIATRIC:  No anxiety or depression.  NEUROLOGY:  No headaches, no vision changes or focal weakness.    PHYSICAL EXAMINATION:  GENERAL:  She is alert, oriented x3, well-nourished, and developed.  She is in   no acute distress.  VITAL SIGNS:  Temperature is 36.6, pulse was 61, respiratory rate was 16,   blood pressure 137/56, and on room air saturating about 92%.  HEENT:  Pupils are equal, reactive to light, icteric.  CHEST:  Clear to auscultation and bilaterally symmetrical.  CARDIOVASCULAR:  S1, S2 heard, regular rate and rhythm.  ABDOMEN:  Soft and nondistended.  Positive bowel sounds.  No guarding or   rigidity, no hernia.  EXTREMITIES:  No edema bilaterally, no cyanosis or clubbing.  PSYCHIATRIC:  Mood appropriate and normal affect.  SKIN:  Jaundiced.    LABORATORY DATA:  WBC count is  7.5, hemoglobin 11.7, platelet count is 267.    Sodium 140, potassium 5.1, BUN was 22, creatinine 0.63, calcium is 9.6, AST   31, ALT 62, alkaline phosphatase 188, total bilirubin 4.1, trending downwards.    CA 19-9 is 849.3.  Hepatitis panel negative.    IMAGING STUDIES:  On 07/04/2019, MRI of the abdomen showed intrahepatic and   extrahepatic ductal dilatation consistent with known biliary obstruction,   gallbladder distention and sludge is present.  Several irregular cystic   structures in the pancreatic head without discrete infiltrative pancreatic   head mass, moderately dilated main pancreatic duct.  No definitive vascular   enhancement identified.  If clinically indicated, pancreatic protocol is   recommended.    ASSESSMENT AND PLAN:  The patient is an 84-year-old female who was diagnosed   with a pancreatic head adenocarcinoma, status post ERCP and biopsies,   presented with obstructive jaundice.  She has internalization of the biliary   drain.  Bilirubin is trending downwards.  Based on the MRI, there is no   invasion of the vascular structures.  Patient was seen by Dr. Avalos, he   would like to do an outpatient CT scan of the abdomen, pancreatic protocol to   further assess the vascular structures.  If no invasion or involvement, she   would be a candidate for upfront surgical resection.  If on a CT scan if she   is not upfront surgical resectable candidate, then I will be happy to see her   as an outpatient to discuss about treatment options to help reduce the mass.     Thank you for allowing me to participate in her care.  I have given patient my   contact information and also have discussed with Dr. Avalos.  After the   CT scan, he will contact me about the plan.       ____________________________________     Radha Boyer MD SR / NTS    DD:  07/13/2019 09:34:30  DT:  07/13/2019 12:07:18    D#:  [id number]  Job#:  [id number]

## 2019-07-13 NOTE — DISCHARGE PLANNING
RN CM met with pt and son, Mata, to discuss snf choice. Choice obtained and faxed to [facility] at ext. [phone].

## 2019-07-13 NOTE — CARE PLAN
Problem: Safety  Goal: Will remain free from injury  Outcome: PROGRESSING AS EXPECTED         Pt CO Asthma Attack x3 days.  Pt states "I'm just getting over a cold which set off the asthma, but I haven't had an inhaler for about a year."  Pt denies N/V/D, SOB, Fevers and any Pain

## 2019-07-13 NOTE — PROGRESS NOTES
[facility] Medicine Daily Progress Note    Date of Service  7/13/2019    Chief Complaint  84 y.o. female admitted 7/10/2019 with Obstructive jaundice    Hospital Course    PMH HTN and hyperlipidemia presented with obstructive jaundice.  Patient successfully had EUS prior to admission and the preliminary results showing malignancy.  Oncologist and pancreatic surgeon both were consulted.        Interval Problem Update  7/11.  Patient has no acute event.  Patient tolerated IR internalization of biliary stent today very well without complications.  Patient still complains of mild abdominal pain. Patient's pain is local 3-4/10, intermittent and does not radiate to other location, sharp and with some tingling. Can be controlled by pain meds.   7/12.  Patient is pleasant overnight no acute overnight adverse event.  Patient's abdominal pain is mild.  Patient was seen by surgeon yesterday and recommend outpatient follow-up.  Still pending CT pancreas protocol to be finished.  However patient does have iodine or contrast allergy.  Needs to further clarify patient's allergic history from patient's family. Patient otherwise denies fever, chills, nausea, vomiting, SOB, CP, headache, constipation, diarrhea, cough, or sputum.  7/13.  Patient is comfortable overnight and no significant chief complaint this morning.  Patient denies nausea vomiting diarrhea or shortness of breath.  Patient complains of general fatigue and body achiness.  Patient was recommended to be discharged to nursing care facility from PT OT standpoint. Patient's pain is general, 2-3/10, intermittent and does not radiate to other location, sharp and with some tingling. Can be controlled by pain meds.     Consultants/Specialty  GI  Pancreatic surgery  Oncologist  ID    Code Status  Full ocde    Disposition  SNF    Review of Systems  Review of Systems   Constitutional: Positive for weight loss. Negative for chills and fever.   HENT: Negative for ear pain, hearing  loss and tinnitus.    Eyes: Negative for blurred vision, double vision and pain.   Respiratory: Negative for cough, sputum production and wheezing.    Cardiovascular: Negative for chest pain, palpitations, leg swelling and PND.   Gastrointestinal: Negative for abdominal pain, constipation, diarrhea and nausea.   Genitourinary: Negative for dysuria, frequency and hematuria.   Musculoskeletal: Negative for back pain, falls and neck pain.   Skin: Negative for rash.   Neurological: Positive for weakness. Negative for dizziness, sensory change and headaches.   Psychiatric/Behavioral: Negative for depression and substance abuse.        Physical Exam  Temp:  [36.4 °C (97.5 °F)-36.6 °C (97.8 °F)] 36.6 °C (97.8 °F)  Pulse:  [61-73] 61  Resp:  [16-17] 16  BP: (131-151)/(56-73) 137/56  SpO2:  [91 %-100 %] 92 %    Physical Exam   Constitutional: She is oriented to person, place, and time. She appears well-developed.   HENT:   Head: Normocephalic.   Nose: Nose normal.   Mouth/Throat: No oropharyngeal exudate.   Eyes: Pupils are equal, round, and reactive to light. Conjunctivae and EOM are normal.   Neck: Normal range of motion. Neck supple.   Cardiovascular: Normal rate and regular rhythm.  Exam reveals no gallop.    No murmur heard.  Pulmonary/Chest: Effort normal and breath sounds normal. No stridor. No respiratory distress. She has no rales. She exhibits no tenderness.   Abdominal: Soft. Bowel sounds are normal. She exhibits no mass. There is tenderness. There is no rebound.   Musculoskeletal: Normal range of motion. She exhibits no edema.   Lymphadenopathy:     She has no cervical adenopathy.   Neurological: She is alert and oriented to person, place, and time. No cranial nerve deficit.   Skin: Skin is warm. No rash noted. She is not diaphoretic.   Psychiatric: Her behavior is normal.       Fluids    Intake/Output Summary (Last 24 hours) at 07/13/19 1244  Last data filed at 07/13/19 1000   Gross per 24 hour   Intake               318 ml   Output                0 ml   Net              318 ml       Laboratory  Recent Labs      07/11/19   0214  07/12/19   0231   WBC  7.6  7.5   RBC  3.61*  3.66*   HEMOGLOBIN  11.3*  11.7*   HEMATOCRIT  35.5*  35.8*   MCV  98.3*  97.8   MCH  31.3  32.0   MCHC  31.8*  32.7*   RDW  54.4*  54.9*   PLATELETCT  273  267   MPV  10.2  10.0     Recent Labs      07/12/19   0231  07/13/19   0045   SODIUM  137  140   POTASSIUM  4.8  5.1   CHLORIDE  98  99   CO2  33  36*   GLUCOSE  142*  103*   BUN  20  22   CREATININE  0.46*  0.63   CALCIUM  9.2  9.6                   Imaging  IR-PERQ  CHOLANGIOGRAM NEW (INCLUDES ALL RADIOLOGY)   Final Result      Successful internalization of the biliary stent with placement of a removable metallic partially covered 10 mm x 60 mm wall flex stent.              Assessment/Plan  Obstructive jaundice- (present on admission)   Assessment & Plan    - with right upper quadrant pain and jaundice.  Ultrasound showed mildly dilated common bile duct and intrahepatic biliary dilation.  MRCP showed intrahepatic and extrahepatic biliary dilation with abrupt narrowing of the common bile duct at the pancreatic head with findings concerning for pancreatic head mass, with distended gallbladder, with no stone demonstrated in the gallbladder or biliary tree. No evidence of acute cholecystitis on ultrasound. No signs of cholangitis. 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  No discrete pancreatic mass seen on MRI.  -failed attempts at ERCP x2. Now has percutaneous biliary drainage.   -GI planning for EUS during this hospitalization.  Awaiting arrangements to transfer to [facility] for EUS.   -Will need outpatient referral/follow-up with hepatobiliary surgery (Dr. Pat).  -continue to trend LFTs.  -Continue symptom control with IV morphine PRN, and PRN oxycodone. Continue antiemetics    -Patient had EUS and preliminary results showing malignancy  -I consulted  oncologist Dr. Boyer and pancreatic surgeon Dr. Avalos as recommended by GI.  -I started patient on fluconazole, Levaquin, Flagyl as recommended per ID.    -- patient had IR internalization of biliary stent 7/11 with good success  Pathology report showing malignancy.  I consulted oncologist, per oncologist recommendation, patient need to follow-up as outpatient pending final decision of surgery by Dr. Pat.  Patient also need to finish CT pancreas protocol as outpatient after finishing antibiotics.  Awaiting for SNF placement.      Elevated LFTs- (present on admission)   Assessment & Plan    - Secondary to biliary obstruction of unknown etiology. Further work-up as above.  Status post EUS 7/10  Pending pathology report  Trending down, improving, tbil cont down  Tbil 8->6->4    I consulted oncologist, per oncologist recommendation, patient need to follow-up as outpatient pending final decision of surgery by Dr. Pat.  Patient also need to finish CT pancreas protocol as outpatient after finishing antibiotics.  Awaiting for SNF placement.      Severe protein-calorie malnutrition (HCC)- (present on admission)   Assessment & Plan    Poor nutrition status.  Continue supplement  I continue dietitian consultation.     HTN (hypertension)- (present on admission)   Assessment & Plan    Blood pressure stable  Continue home medication       Dyslipidemia- (present on admission)   Assessment & Plan    Patient not on home medication and repeat lipid fell showing .  Given patient abnormal liver function, reasonable to continue hold off statin          VTE prophylaxis: lovenox      Current Facility-Administered Medications:   •  enoxaparin (LOVENOX) inj 30 mg, 30 mg, Subcutaneous, DAILY, Earlene Bess M.D., 30 mg at 07/12/19 1812  •  ondansetron (ZOFRAN ODT) dispertab 4 mg, 4 mg, Oral, Q4HRS PRN, Earlene Bses M.D.  •  ondansetron (ZOFRAN) syringe/vial injection 4 mg, 4 mg, Intravenous, Q4HRS PRN, Earlene Bess  M.D.  •  senna-docusate (PERICOLACE or SENOKOT S) 8.6-50 MG per tablet 2 Tab, 2 Tab, Oral, BID **AND** polyethylene glycol/lytes (MIRALAX) PACKET 1 Packet, 1 Packet, Oral, QDAY PRN **AND** magnesium hydroxide (MILK OF MAGNESIA) suspension 30 mL, 30 mL, Oral, QDAY PRN **AND** bisacodyl (DULCOLAX) suppository 10 mg, 10 mg, Rectal, QDAY PRN, Earlene Bess M.D.  •  lisinopril (PRINIVIL) tablet 5 mg, 5 mg, Oral, DAILY, Earlene Bess M.D., 5 mg at 07/13/19 0539  •  morphine (pf) 4 mg/ml injection 1-2 mg, 1-2 mg, Intravenous, Q3HRS PRN, Earlene Bess M.D.  •  oxyCODONE immediate-release (ROXICODONE) tablet 5-10 mg, 5-10 mg, Oral, Q4HRS PRN, Earlene Bess M.D., 10 mg at 07/12/19 1415  •  levoFLOXacin (LEVAQUIN) tablet 500 mg, 500 mg, Oral, DAILY, Earlene Bess M.D., 500 mg at 07/13/19 0539  •  fluconazole (DIFLUCAN) tablet 200 mg, 200 mg, Oral, DAILY, Earlene Bess M.D., 200 mg at 07/13/19 0539  •  metroNIDAZOLE (FLAGYL) tablet 500 mg, 500 mg, Oral, Q12HRS, Earlene Bess M.D., 500 mg at 07/13/19 0539

## 2019-07-13 NOTE — DISCHARGE PLANNING
Anticipated Discharge Disposition: SNF for skilled therapies then home with family support.    Action: RN CM met with pt to discuss dc plan. Adult children are in cafeteria for lunch. Choice for snf's left at bedside with pt. Pt will call RN CM at [phone] when family is at bedside to assist with choice decision.    Barriers to Discharge: Medical clearance, accepting snf.    Plan: Meet with pt and family to obtain snf after lunch.

## 2019-07-13 NOTE — PROGRESS NOTES
Gastroenterology Progress Note     Author: Horacechance Sonlexie   Date & Time Created: 7/13/2019 11:10 AM    Chief complaint: Obstructive Jaundice    Interval History:  Patient reports she feels  ok with improved abdominal pain.  Her appetite is good and she is tolerating her regular diet well.  She denies and nausea, vomiting, constipation or diarrhea.  Her bowel movements are daily and formed and comfortable.  She denies blood and melena.      She is however very distraught about the confirmation of the diagnosis of pancreatic invasive adenocarcinoma.  She does not know what to do.  Surgical options versus chemotherapy.  She is also awaiting further imaging.    Review of Systems:  Review of Systems   Constitutional: Negative for fever and malaise/fatigue.   Respiratory: Negative.  Negative for cough and shortness of breath.    Cardiovascular: Negative.  Negative for chest pain and orthopnea.   Gastrointestinal: Positive for abdominal pain. Negative for blood in stool, constipation, diarrhea, heartburn, melena, nausea and vomiting.       Physical Exam:  Physical Exam   Constitutional: She is oriented to person, place, and time. She appears distressed.   Distressed with the new diagnosis confirmed of pancreatic cancer.   HENT:   Head: Normocephalic and atraumatic.   Eyes: Pupils are equal, round, and reactive to light. Conjunctivae are normal. Scleral icterus is present.   Neck: No tracheal deviation present.   Cardiovascular: Normal rate, regular rhythm and normal heart sounds.    Pulmonary/Chest: Effort normal and breath sounds normal. No stridor.   Abdominal: Soft. Bowel sounds are normal. She exhibits no distension. There is no tenderness (LUQ). There is no rebound.   Musculoskeletal: Normal range of motion.   Neurological: She is alert and oriented to person, place, and time. She has normal reflexes.   Skin: Skin is warm and dry.   Psychiatric:   Patient is currently distraught as I confirmed the diagnosis of  pancreatic cancer.   Vitals reviewed.  She is ambulating with a walker at time of exam and retired to a chair for assessment.     Labs:          Recent Labs      19   004   SODIUM  137  140   POTASSIUM  4.8  5.1   CHLORIDE  98  99   CO2  33  36*   BUN  20  22   CREATININE  0.46*  0.63   CALCIUM  9.2  9.6     Recent Labs      19   0045   ALTSGPT  81*  71*  62*   ASTSGOT  39  31  31   ALKPHOSPHAT  229*  205*  188*   TBILIRUBIN  5.1*  4.4*  4.1*   DBILIRUBIN  2.8*   --    --    GLUCOSE   --   142*  103*     Recent Labs      19   RBC  3.61*  3.66*   HEMOGLOBIN  11.3*  11.7*   HEMATOCRIT  35.5*  35.8*   PLATELETCT  273  267     Recent Labs      195   WBC  7.6  7.5   --    NEUTSPOLYS  81.50*  89.30*   --    LYMPHOCYTES  7.10*  4.80*   --    MONOCYTES  10.90  5.70   --    EOSINOPHILS  0.10  0.00   --    BASOPHILS  0.10  0.10   --    ASTSGOT  39  31  31   ALTSGPT  81*  71*  62*   ALKPHOSPHAT  229*  205*  188*   TBILIRUBIN  5.1*  4.4*  4.1*     Hemodynamics:  Temp (24hrs), Av.5 °C (97.7 °F), Min:36.4 °C (97.5 °F), Max:36.6 °C (97.8 °F)  Temperature: 36.6 °C (97.8 °F)  Pulse  Av.7  Min: 60  Max: 99   Blood Pressure : 137/56     Respiratory:    Respiration: 16, Pulse Oximetry: 92 %        RUL Breath Sounds: Clear, RML Breath Sounds: Clear, RLL Breath Sounds: Clear, LEVAR Breath Sounds: Clear, LLL Breath Sounds: Clear  Fluids:    Intake/Output Summary (Last 24 hours) at 19 1535  Last data filed at 19 1000   Gross per 24 hour   Intake              728 ml   Output                0 ml   Net              728 ml        GI/Nutrition:  Orders Placed This Encounter   Procedures   • Diet Order Low Fiber(GI Soft), Hepatic     Standing Status:   Standing     Number of Occurrences:   1     Order Specific Question:   Diet:     Answer:   Low Fiber(GI Soft) [2]     Order Specific  Question:   Diet:     Answer:   Hepatic [9]     Medical Decision Making, by Problem:  Active Hospital Problems    Diagnosis   • Obstructive jaundice [K83.8]   • Elevated LFTs [R94.5]   • Severe protein-calorie malnutrition (HCC) [E43]   • HTN (hypertension) [I10]   • Dyslipidemia [E78.5]       Plan:  Patient post IR guided biliary drain internalization,     Diagnosis of invasive adenocarcinoma of the head of the pancreas is confirmed by surgical pathology.  Patient is very distraught.  I discussed this with the nursing staff as well as the attending team.  She will be scheduled for skilled nursing facility.  Further plans will be made to follow-up with the pancreaticobiliary surgeon.  Follow-up with oncology as well.    Dr Avalos recommends outpatient surgical consideration once pathology returns and pancreatic protocol is complete for possible treatment vs surgery.     Ultimately if chemo is planned, patient should be started on antiviral for hepatitis B to prevent flare or worsening liver function.    I recommend if chemo is considered to start her on Levemir 1500 mg daily, close follow-up of liver function test and hepatitis B virus DNA will be required.    Follow-up with digestive health in approximately 2 to 4 weeks.    We will sign off thank you for this consultation.    This plan was discussed with Dr. Horace Forbes.   Quality-Core Measures

## 2019-07-14 NOTE — PROGRESS NOTES
[facility] Medicine Daily Progress Note    Date of Service  7/14/2019    Chief Complaint  84 y.o. female admitted 7/10/2019 with Obstructive jaundice    Hospital Course    PMH HTN and hyperlipidemia presented with obstructive jaundice.  Patient successfully had EUS prior to admission and the preliminary results showing malignancy.  Oncologist and pancreatic surgeon both were consulted.        Interval Problem Update  7/11.  Patient has no acute event.  Patient tolerated IR internalization of biliary stent today very well without complications.  Patient still complains of mild abdominal pain. Patient's pain is local 3-4/10, intermittent and does not radiate to other location, sharp and with some tingling. Can be controlled by pain meds.   7/12.  Patient is pleasant overnight no acute overnight adverse event.  Patient's abdominal pain is mild.  Patient was seen by surgeon yesterday and recommend outpatient follow-up.  Still pending CT pancreas protocol to be finished.  However patient does have iodine or contrast allergy.  Needs to further clarify patient's allergic history from patient's family. Patient otherwise denies fever, chills, nausea, vomiting, SOB, CP, headache, constipation, diarrhea, cough, or sputum.  7/13.  Patient is comfortable overnight and no significant chief complaint this morning.  Patient denies nausea vomiting diarrhea or shortness of breath.  Patient complains of general fatigue and body achiness.  Patient was recommended to be discharged to nursing care facility from PT OT standpoint. Patient's pain is general, 2-3/10, intermittent and does not radiate to other location, sharp and with some tingling. Can be controlled by pain meds.   7/14.  Patient has been stable and has minimal abdominal pain.  Patient complains of overall fatigue.  Patient is pleasant and general stable. Patient otherwise denies fever, chills, nausea, vomiting, adb pain, SOB, CP, headache, constipation, diarrhea, cough, or  sputum.    Consultants/Specialty  GI  Pancreatic surgery  Oncologist  ID    Code Status  Full ocde    Disposition  SNF    Review of Systems  Review of Systems   Constitutional: Positive for weight loss. Negative for fever and malaise/fatigue.   HENT: Negative for ear pain, hearing loss and tinnitus.    Eyes: Negative for blurred vision, double vision and pain.   Respiratory: Negative for hemoptysis, sputum production and wheezing.    Cardiovascular: Negative for chest pain, palpitations, claudication and leg swelling.   Gastrointestinal: Negative for abdominal pain, diarrhea, nausea and vomiting.   Genitourinary: Negative for dysuria, frequency and urgency.   Musculoskeletal: Negative for back pain, falls and myalgias.   Skin: Negative for rash.   Neurological: Positive for weakness. Negative for dizziness, sensory change and headaches.   Psychiatric/Behavioral: Negative for depression and substance abuse.        Physical Exam  Temp:  [36.6 °C (97.9 °F)-36.8 °C (98.3 °F)] 36.6 °C (97.9 °F)  Pulse:  [71-82] 79  Resp:  [16-18] 16  BP: (115-147)/(50-60) 115/57  SpO2:  [92 %-94 %] 94 %    Physical Exam   Constitutional: She is oriented to person, place, and time. She appears well-developed.   HENT:   Head: Normocephalic.   Nose: Nose normal.   Mouth/Throat: No oropharyngeal exudate.   Eyes: Pupils are equal, round, and reactive to light. Conjunctivae and EOM are normal.   Neck: Normal range of motion. Neck supple. No tracheal deviation present.   Cardiovascular: Normal rate and regular rhythm.  Exam reveals no gallop.    No murmur heard.  Pulmonary/Chest: Effort normal and breath sounds normal. No stridor. No respiratory distress. She has no wheezes. She exhibits no tenderness.   Abdominal: Soft. Bowel sounds are normal. She exhibits no distension and no mass. There is no tenderness. There is no rebound.   Musculoskeletal: Normal range of motion. She exhibits no edema.   Lymphadenopathy:     She has no cervical  adenopathy.   Neurological: She is alert and oriented to person, place, and time. No cranial nerve deficit.   Skin: Skin is warm. No rash noted. She is not diaphoretic.   Psychiatric: Her behavior is normal.       Fluids    Intake/Output Summary (Last 24 hours) at 07/14/19 1325  Last data filed at 07/14/19 1000   Gross per 24 hour   Intake              704 ml   Output                0 ml   Net              704 ml       Laboratory  Recent Labs      07/12/19   0231   WBC  7.5   RBC  3.66*   HEMOGLOBIN  11.7*   HEMATOCRIT  35.8*   MCV  97.8   MCH  32.0   MCHC  32.7*   RDW  54.9*   PLATELETCT  267   MPV  10.0     Recent Labs      07/12/19   0231  07/13/19   0045  07/14/19   0210   SODIUM  137  140  138   POTASSIUM  4.8  5.1  4.9   CHLORIDE  98  99  100   CO2  33  36*  34*   GLUCOSE  142*  103*  91   BUN  20  22  19   CREATININE  0.46*  0.63  0.58   CALCIUM  9.2  9.6  9.6                   Imaging  IR-PERQ  CHOLANGIOGRAM NEW (INCLUDES ALL RADIOLOGY)   Final Result      Successful internalization of the biliary stent with placement of a removable metallic partially covered 10 mm x 60 mm wall flex stent.              Assessment/Plan  Obstructive jaundice- (present on admission)   Assessment & Plan    - with right upper quadrant pain and jaundice.  Ultrasound showed mildly dilated common bile duct and intrahepatic biliary dilation.  MRCP showed intrahepatic and extrahepatic biliary dilation with abrupt narrowing of the common bile duct at the pancreatic head with findings concerning for pancreatic head mass, with distended gallbladder, with no stone demonstrated in the gallbladder or biliary tree. No evidence of acute cholecystitis on ultrasound. No signs of cholangitis. 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  No discrete pancreatic mass seen on MRI.  -failed attempts at ERCP x2. Now has percutaneous biliary drainage.   -GI planning for EUS during this hospitalization.  Awaiting  arrangements to transfer to [facility] for EUS.   -Will need outpatient referral/follow-up with hepatobiliary surgery (Dr. Pat).  -continue to trend LFTs.  -Continue symptom control with IV morphine PRN, and PRN oxycodone. Continue antiemetics    -Patient had EUS and preliminary results showing malignancy  -I consulted oncologist Dr. Boyer and pancreatic surgeon Dr. Avalos as recommended by GI.  -I started patient on fluconazole, Levaquin, Flagyl as recommended per ID.    -- patient had IR internalization of biliary stent 7/11 with good success  Pathology report showing malignancy.  I consulted oncologist, per oncologist recommendation, patient need to follow-up as outpatient pending final decision of surgery by Dr. Pat.  Patient also need to finish CT pancreas protocol as outpatient after finishing antibiotics.  Pending nursing care facility to accept the patient.     Elevated LFTs- (present on admission)   Assessment & Plan    - Secondary to biliary obstruction of unknown etiology. Further work-up as above.  Status post EUS 7/10  Pending pathology report  Trending down, improving, tbil cont down  Tbil 8->6->4    I consulted oncologist, per oncologist recommendation, patient need to follow-up as outpatient pending final decision of surgery by Dr. Pat.  Patient also need to finish CT pancreas protocol as outpatient after finishing antibiotics.  Pending nursing care facility to accept the patient.     Severe protein-calorie malnutrition (HCC)- (present on admission)   Assessment & Plan    Poor nutrition status.  Continue supplement  I continue dietitian consultation.  Pending nursing care facility to accept the patient.     HTN (hypertension)- (present on admission)   Assessment & Plan    Blood pressure stable  Continue home medication       Dyslipidemia- (present on admission)   Assessment & Plan    Patient not on home medication and repeat lipid fell showing .  Given  patient abnormal liver function, reasonable to continue hold off statin          VTE prophylaxis: lovenox      Current Facility-Administered Medications:   •  enoxaparin (LOVENOX) inj 30 mg, 30 mg, Subcutaneous, DAILY, Earlene Bess M.D., 30 mg at 07/13/19 1658  •  ondansetron (ZOFRAN ODT) dispertab 4 mg, 4 mg, Oral, Q4HRS PRN, Earlene Bess M.D.  •  ondansetron (ZOFRAN) syringe/vial injection 4 mg, 4 mg, Intravenous, Q4HRS PRN, Earlene Bess M.D.  •  senna-docusate (PERICOLACE or SENOKOT S) 8.6-50 MG per tablet 2 Tab, 2 Tab, Oral, BID **AND** polyethylene glycol/lytes (MIRALAX) PACKET 1 Packet, 1 Packet, Oral, QDAY PRN **AND** magnesium hydroxide (MILK OF MAGNESIA) suspension 30 mL, 30 mL, Oral, QDAY PRN **AND** bisacodyl (DULCOLAX) suppository 10 mg, 10 mg, Rectal, QDAY PRN, Earlene Bess M.D.  •  lisinopril (PRINIVIL) tablet 5 mg, 5 mg, Oral, DAILY, Earlene Bess M.D., 5 mg at 07/14/19 0527  •  morphine (pf) 4 mg/ml injection 1-2 mg, 1-2 mg, Intravenous, Q3HRS PRN, Earlene Bess M.D.  •  oxyCODONE immediate-release (ROXICODONE) tablet 5-10 mg, 5-10 mg, Oral, Q4HRS PRN, Earlene Bess M.D., 10 mg at 07/12/19 1415  •  levoFLOXacin (LEVAQUIN) tablet 500 mg, 500 mg, Oral, DAILY, Earlene Bess M.D., 500 mg at 07/14/19 0526  •  fluconazole (DIFLUCAN) tablet 200 mg, 200 mg, Oral, DAILY, Earlene Bess M.D., 200 mg at 07/14/19 0526  •  metroNIDAZOLE (FLAGYL) tablet 500 mg, 500 mg, Oral, Q12HRS, Ealrene Bess M.D., 500 mg at 07/14/19 0526

## 2019-07-14 NOTE — PROGRESS NOTES
Bedside report received, pt care assumed. Pt denies any additional needs at this time. Son at bedside. Anticipating discharge to SNF then home for support. Bed in lowest position, pt educated on fall risk and verbalized understanding, call light within reach.

## 2019-07-14 NOTE — CARE PLAN
Problem: Knowledge Deficit  Goal: Knowledge of disease process/condition, treatment plan, diagnostic tests, and medications will improve  Outcome: PROGRESSING AS EXPECTED  Patient and family educated about POC.  All questions answered in regards to disease process, treatment, medications and diet.  Patient and family verbalized understanding and voiced no further questions at this time.    Problem: Psychosocial Needs:  Goal: Level of anxiety will decrease  Comforted pt and family and provided active listening.

## 2019-07-15 PROBLEM — C25.9 PANCREATIC CANCER (HCC): Status: ACTIVE | Noted: 2019-01-01

## 2019-07-15 NOTE — PROGRESS NOTES
[facility] Medicine Daily Progress Note    Date of Service  7/15/2019    Chief Complaint  84 y.o. female admitted 7/10/2019 with Obstructive jaundice    Hospital Course    PMH HTN and hyperlipidemia presented with obstructive jaundice.  Patient successfully had EUS prior to admission and the preliminary results showing malignancy.  Oncologist and pancreatic surgeon both were consulted.        Interval Problem Update  7/11.  Patient has no acute event.  Patient tolerated IR internalization of biliary stent today very well without complications.  Patient still complains of mild abdominal pain. Patient's pain is local 3-4/10, intermittent and does not radiate to other location, sharp and with some tingling. Can be controlled by pain meds.   7/12.  Patient is pleasant overnight no acute overnight adverse event.  Patient's abdominal pain is mild.  Patient was seen by surgeon yesterday and recommend outpatient follow-up.  Still pending CT pancreas protocol to be finished.  However patient does have iodine or contrast allergy.  Needs to further clarify patient's allergic history from patient's family. Patient otherwise denies fever, chills, nausea, vomiting, SOB, CP, headache, constipation, diarrhea, cough, or sputum.  7/13.  Patient is comfortable overnight and no significant chief complaint this morning.  Patient denies nausea vomiting diarrhea or shortness of breath.  Patient complains of general fatigue and body achiness.  Patient was recommended to be discharged to nursing care facility from PT OT standpoint. Patient's pain is general, 2-3/10, intermittent and does not radiate to other location, sharp and with some tingling. Can be controlled by pain meds.   7/14.  Patient has been stable and has minimal abdominal pain.  Patient complains of overall fatigue.  Patient is pleasant and general stable. Patient otherwise denies fever, chills, nausea, vomiting, adb pain, SOB, CP, headache, constipation, diarrhea, cough, or  sputum.  7/15. Patient is comfortable and no acute overnight event.  Patient currently still awaiting for nursing care facility to accept the patient. Patient's pain is general, 2-3/10, intermittent and does not radiate to other location, sharp and with some tingling. Can be controlled by pain meds.     Consultants/Specialty  GI  Pancreatic surgery  Oncologist  ID    Code Status  Full ocde    Disposition  SNF    Review of Systems  Review of Systems   Constitutional: Positive for weight loss. Negative for chills, fever and malaise/fatigue.   HENT: Negative for congestion, ear pain, hearing loss and tinnitus.    Eyes: Negative for blurred vision, double vision and pain.   Respiratory: Negative for cough, hemoptysis, sputum production, shortness of breath and wheezing.    Cardiovascular: Negative for chest pain, palpitations, orthopnea, claudication and leg swelling.   Gastrointestinal: Negative for abdominal pain, constipation, diarrhea, nausea and vomiting.   Genitourinary: Negative for dysuria, frequency and urgency.   Musculoskeletal: Negative for back pain, falls, myalgias and neck pain.   Skin: Negative for rash.   Neurological: Positive for weakness. Negative for dizziness, tingling, tremors, sensory change, focal weakness and headaches.   Psychiatric/Behavioral: Negative for depression, hallucinations and substance abuse.        Physical Exam  Temp:  [36.6 °C (97.8 °F)-37.1 °C (98.7 °F)] 37.1 °C (98.7 °F)  Pulse:  [66-88] 88  Resp:  [16-17] 17  BP: (125-132)/(59-74) 128/74  SpO2:  [93 %-95 %] 94 %    Physical Exam   Constitutional: She is oriented to person, place, and time. She appears well-developed and well-nourished.   HENT:   Head: Normocephalic and atraumatic.   Nose: Nose normal.   Mouth/Throat: No oropharyngeal exudate.   Eyes: Pupils are equal, round, and reactive to light. Conjunctivae and EOM are normal.   Neck: Normal range of motion. Neck supple. No JVD present. No tracheal deviation present. No  thyromegaly present.   Cardiovascular: Normal rate, regular rhythm and normal heart sounds.  Exam reveals no gallop and no friction rub.    No murmur heard.  Pulmonary/Chest: Effort normal and breath sounds normal. No stridor. No respiratory distress. She has no wheezes. She has no rales. She exhibits no tenderness.   Abdominal: Soft. Bowel sounds are normal. She exhibits no distension and no mass. There is no tenderness. There is no rebound and no guarding.   Musculoskeletal: Normal range of motion. She exhibits no edema or tenderness.   Lymphadenopathy:     She has no cervical adenopathy.   Neurological: She is alert and oriented to person, place, and time. No cranial nerve deficit.   Skin: Skin is warm. No rash noted. She is not diaphoretic.   Psychiatric: Her behavior is normal.       Fluids    Intake/Output Summary (Last 24 hours) at 07/15/19 1012  Last data filed at 07/14/19 2000   Gross per 24 hour   Intake              368 ml   Output                0 ml   Net              368 ml       Laboratory      Recent Labs      07/13/19   0045  07/14/19   0210   SODIUM  140  138   POTASSIUM  5.1  4.9   CHLORIDE  99  100   CO2  36*  34*   GLUCOSE  103*  91   BUN  22  19   CREATININE  0.63  0.58   CALCIUM  9.6  9.6                   Imaging  IR-PERQ  CHOLANGIOGRAM NEW (INCLUDES ALL RADIOLOGY)   Final Result      Successful internalization of the biliary stent with placement of a removable metallic partially covered 10 mm x 60 mm wall flex stent.              Assessment/Plan  Obstructive jaundice- (present on admission)   Assessment & Plan    - with right upper quadrant pain and jaundice.  Ultrasound showed mildly dilated common bile duct and intrahepatic biliary dilation.  MRCP showed intrahepatic and extrahepatic biliary dilation with abrupt narrowing of the common bile duct at the pancreatic head with findings concerning for pancreatic head mass, with distended gallbladder, with no stone demonstrated in the  gallbladder or biliary tree. No evidence of acute cholecystitis on ultrasound. No signs of cholangitis. 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  No discrete pancreatic mass seen on MRI.  -failed attempts at ERCP x2. Now has percutaneous biliary drainage.   -GI planning for EUS during this hospitalization.  Awaiting arrangements to transfer to [facility] for EUS.   -Will need outpatient referral/follow-up with hepatobiliary surgery (Dr. Pat).  -continue to trend LFTs.  -Continue symptom control with IV morphine PRN, and PRN oxycodone. Continue antiemetics    -Patient had EUS and preliminary results showing malignancy  -I consulted oncologist Dr. Boyer and pancreatic surgeon Dr. Avalos as recommended by GI.  -I started patient on fluconazole, Levaquin, Flagyl as recommended per ID.    -- patient had IR internalization of biliary stent 7/11 with good success  Pathology report showing malignancy.  I consulted oncologist, per oncologist recommendation, patient need to follow-up as outpatient pending final decision of surgery by Dr. Pat.  Patient also need to finish CT pancreas protocol as outpatient after finishing antibiotics.  Currently comfortable and pending nursing care facility placement.     Elevated LFTs- (present on admission)   Assessment & Plan    - Secondary to biliary obstruction of unknown etiology. Further work-up as above.  Status post EUS 7/10  Pending pathology report  Trending down     Severe protein-calorie malnutrition (HCC)- (present on admission)   Assessment & Plan    Poor nutrition status.  Continue supplement  I continue dietitian consultation.  Pending nursing care facility to accept patient     HTN (hypertension)- (present on admission)   Assessment & Plan    Blood pressure stable  Continue home medication       Dyslipidemia- (present on admission)   Assessment & Plan    Patient not on home medication and repeat lipid fell  showing .  Given patient abnormal liver function, reasonable to continue hold off statin         VTE prophylaxis: lovenox      Current Facility-Administered Medications:   •  enoxaparin (LOVENOX) inj 30 mg, 30 mg, Subcutaneous, DAILY, Earlene Bess M.D., 30 mg at 07/14/19 1730  •  ondansetron (ZOFRAN ODT) dispertab 4 mg, 4 mg, Oral, Q4HRS PRN, Earlene Bess M.D.  •  ondansetron (ZOFRAN) syringe/vial injection 4 mg, 4 mg, Intravenous, Q4HRS PRN, Earlene Bess M.D.  •  senna-docusate (PERICOLACE or SENOKOT S) 8.6-50 MG per tablet 2 Tab, 2 Tab, Oral, BID **AND** polyethylene glycol/lytes (MIRALAX) PACKET 1 Packet, 1 Packet, Oral, QDAY PRN **AND** magnesium hydroxide (MILK OF MAGNESIA) suspension 30 mL, 30 mL, Oral, QDAY PRN **AND** bisacodyl (DULCOLAX) suppository 10 mg, 10 mg, Rectal, QDAY PRN, Earlene Bess M.D.  •  lisinopril (PRINIVIL) tablet 5 mg, 5 mg, Oral, DAILY, Earlene Bess M.D., 5 mg at 07/15/19 0544  •  morphine (pf) 4 mg/ml injection 1-2 mg, 1-2 mg, Intravenous, Q3HRS PRN, Earlene Bess M.D.  •  oxyCODONE immediate-release (ROXICODONE) tablet 5-10 mg, 5-10 mg, Oral, Q4HRS PRN, Earlene Bess M.D., 10 mg at 07/12/19 1415  •  levoFLOXacin (LEVAQUIN) tablet 500 mg, 500 mg, Oral, DAILY, Earlene Bess M.D., 500 mg at 07/15/19 0800  •  fluconazole (DIFLUCAN) tablet 200 mg, 200 mg, Oral, DAILY, Earlene Bess M.D., 200 mg at 07/15/19 0544  •  metroNIDAZOLE (FLAGYL) tablet 500 mg, 500 mg, Oral, Q12HRS, Earlene Bess M.D., 500 mg at 07/15/19 0544

## 2019-07-15 NOTE — THERAPY
"Physical Therapy Treatment completed.   Bed Mobility:  Supine to Sit: Supervised  Transfers: Sit to Stand: Supervised  Gait: Level Of Assist: Supervised with Front-Wheel Walker       Plan of Care: Patient with no further skilled PT needs in the acute care setting at this time  Discharge Recommendations: Equipment: Front-Wheel Walker. Post-acute therapy Currently anticipate no further skilled therapy needs once patient is discharged from the inpatient setting.     See \"Rehab Therapy-Acute\" Patient Summary Report for complete documentation.       Pt showed improvement in mobility as pt was able to perfrom bed mobility, transfers, adequate balance, and ambulation at spv level. Pt showed improvement in activity tolerance pt required only 1 rest break during ambulation due to feeling dizzy, which resided in a few seconds after stopping. Pt demonstrated ability to transfer from various height surfaces at spv level. Pt ambulated ~10 ft w/out AD w/ no LOB showing good dynamic balance. Based on pt current mobility level pt does not require acute care PT. Pt would benefit from continued ambulation w/ nursing staff.   "

## 2019-07-15 NOTE — DISCHARGE PLANNING
Received Transport Form 1330  Spoke to Rito at [employer]  Transport is scheduled for today at 1630 going to [facility].

## 2019-07-15 NOTE — DISCHARGE PLANNING
Anticipated Discharge Disposition: skilled    Action: Transfer to [facility] Skilled at 16:30 via [employer]. COBRA and chart copy on chart. Bedside RN aware. MD to update dc summary.     Barriers to Discharge:     Plan: to skilled at 16:30. Marbin Nichols notified by phone of above.

## 2019-07-15 NOTE — CARE PLAN
Problem: Nutritional:  Goal: Achieve adequate nutritional intake  Patient will consume ~50% of meals and supplements.   Outcome: PROGRESSING AS EXPECTED    Intervention: Advance diet as tolerated  Pt is on low fiber/hepatic diet.   Intervention: Monitor PO intake, weights, and laboratory values  ADL documentation shows variable PO intake, < 25 -100%. However pt tends to consume >50% of Boost supplement if PO intake of meals is < 50%.   Variable intake with Boost supplementation is likely adequate to meet nutritional needs, however will continue to monitor PO intake.     RD following

## 2019-07-15 NOTE — DISCHARGE SUMMARY
Discharge Summary    CHIEF COMPLAINT ON ADMISSION  Obstructive jaundice      Reason for Admission  Obstructive jaundice    CODE STATUS  Full Code    HPI & HOSPITAL COURSE  84 y.o. female With hyperlipidemia, hypertension, admitted 7/2/2019 with right upper quadrant and epigastric abdominal pain with progressive jaundice over the last 4 days, along with dark urine and pale bowel movements.  Initial work-up showed no leukocytosis, with significant transaminitis with ALT of 283, AST of 237, total bilirubin of 14.5, and alkaline phosphatase of 505.  Lipase was normal.  Right upper quadrant ultrasound showed intrahepatic biliary dilation with mildly dilated CBD, with gallbladder sludge with borderline thickened gallbladder wall, with no pericholecystic fluid to suggest cholecystitis.  Viral hepatitis panel showed reactive hepatitis B surface antigen, with negative hepatitis B core antibody.   MRCP was ordered, and GI was consulted. MRCP showed intrahepatic and extrahepatic biliary dilation with abrupt narrowing of the common bile duct at the pancreatic head with findings concerning for pancreatic head mass, with distended gallbladder, with no stone demonstrated in the gallbladder or biliary tree.  She had an unsuccessful ERCP, with plan for repeat one by a different endoscopist. MRI of the abdomen showed several irregular cystic structures in the pancreatic head without any discrete infiltrative necrotic head mass identified, with moderately dilated main pancreatic duct with dilated side branches. CA 19-9 is high at 849.3. Surgery was consulted. She had another attempt at ERCP, but after numerous attempts was unable to pass the ampulla as the guidewire kept hitting hard tissue and was unable to pass.  GI recommended IR percutaneous biliary drainage. HBV was detected with quantitative testing by [facility], 153 IU/mL. HBE Ag was negative. She had a percutaneous transhepatic cholangiogram, with placement of percutaneous  drainage.  Biliary fluid Gram stain showed no organisms. Surgery recommended no urgent surgical intervention as needed, and to follow-up with outpatient hepatobiliary surgery.  GI planned to do EUS during this hospitalization so subsequently patient was transferred to another facility for EUS.  Patient tolerated procedure and has a successful EUS.  Pathology report came back with malignancy.  Patient was then seen by infectious disease specialist, oncologist, and the pancreas surgery.  Patient was recommended to be on oral antibiotics due to positive bile culture and patient was put on 1 week of Cipro, Flagyl and fluconazole and patient tolerated.  Patient need to continue above medication as outpatient.  Patient also will need to be seen by Surgeon as outpatient to follow-up possibility of resection of tumor.  Oncologist will assist further treatment pending surgical decision.  Patient also will need a CT with contrast with pancreatic protocol.  Patient does have allergy reaction with iodine contrast previously.  Addressed as outpatient.  Patient currently recommended to go to nursing care facility for rehabilitation.  Follow-up will need to be set up with Dr. Bean as outpatient.      Obstructive jaundice- (present on admission)   Assessment & Plan    - with right upper quadrant pain and jaundice.  Ultrasound showed mildly dilated common bile duct and intrahepatic biliary dilation.  MRCP showed intrahepatic and extrahepatic biliary dilation with abrupt narrowing of the common bile duct at the pancreatic head with findings concerning for pancreatic head mass, with distended gallbladder, with no stone demonstrated in the gallbladder or biliary tree. No evidence of acute cholecystitis on ultrasound. No signs of cholangitis. 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  No discrete pancreatic mass seen on MRI.  -failed attempts at ERCP x2. Now has percutaneous biliary drainage.    -GI planning for EUS during this hospitalization.  Awaiting arrangements to transfer to [facility] for EUS.   -Will need outpatient referral/follow-up with hepatobiliary surgery (Dr. Pat).  -continue to trend LFTs.  -Continue symptom control with IV morphine PRN, and PRN oxycodone. Continue antiemetics    -Patient had EUS and preliminary results showing malignancy  -I consulted oncologist Dr. Boyer and pancreatic surgeon Dr. Avalos as recommended by GI.  -I started patient on fluconazole, Levaquin, Flagyl as recommended per ID.    -- patient had IR internalization of biliary stent 7/11 with good success  Pathology report showing malignancy.  I consulted oncologist, per oncologist recommendation, patient need to follow-up as outpatient pending final decision of surgery by Dr. Pat.  Patient also need to finish CT pancreas protocol as outpatient after finishing antibiotics.  Currently comfortable and pending nursing care facility placement.     Elevated LFTs- (present on admission)   Assessment & Plan    - Secondary to biliary obstruction of unknown etiology. Further work-up as above.  Status post EUS 7/10  Pending pathology report  Trending down     Severe protein-calorie malnutrition (HCC)- (present on admission)   Assessment & Plan    Poor nutrition status.  Continue supplement  I continue dietitian consultation.  Pending nursing care facility to accept patient     HTN (hypertension)- (present on admission)   Assessment & Plan    Blood pressure stable  Continue home medication       Dyslipidemia- (present on admission)   Assessment & Plan    Patient not on home medication and repeat lipid fell showing .  Given patient abnormal liver function, reasonable to continue hold off statin         Therefore, she is discharged in fair and stable condition to skilled nursing facility.    The patient met 2-midnight criteria for an inpatient stay at the time of discharge.      FOLLOW UP  ITEMS POST DISCHARGE  7/15/2019    DISCHARGE DIAGNOSES      Obstructive jaundice POA: Yes    Elevated LFTs POA: Yes    Dyslipidemia POA: Yes    HTN (hypertension) POA: Yes    Severe protein-calorie malnutrition (HCC) POA: Yes    Pancreatic cancer (HCC) POA: Unknown    FOLLOW UP  Future Appointments  Date Time Provider Department Center   7/16/2019 3:40 PM Monica Rodriguez M.D. [facility]     No follow-up provider specified.    MEDICATIONS ON DISCHARGE     Medication List      START taking these medications      Instructions   fluconazole 200 MG Tabs  Start taking on:  7/16/2019  Commonly known as:  DIFLUCAN   Take 1 Tab by mouth every day for 1 day.  Dose:  200 mg     levoFLOXacin 500 MG tablet  Start taking on:  7/16/2019  Commonly known as:  LEVAQUIN   Take 1 Tab by mouth every day for 1 day.  Dose:  500 mg     metroNIDAZOLE 500 MG Tabs  Commonly known as:  FLAGYL   Take 1 Tab by mouth every 12 hours for 2 days.  Dose:  500 mg     senna-docusate 8.6-50 MG Tabs  Commonly known as:  PERICOLACE or SENOKOT S   Take 2 Tabs by mouth 2 Times a Day.  Dose:  2 Tab        CONTINUE taking these medications      Instructions   lisinopril 5 MG Tabs  Commonly known as:  PRINIVIL   Doctor's comments:  Insurance will pay for 100 day supply.. Please remind pt to get labs done  Take 1 Tab by mouth every day.  Dose:  5 mg            Allergies  Allergies   Allergen Reactions   • Iodine Anaphylaxis and Swelling   • Aspirin    • Clindamycin    • Lactose      Stomach upset    • Penicillins      Hospitalized with reaction   • Robitussin [Guiatuss] Diarrhea   • Shellfish Allergy    • Tetanus Toxoid    • Zithromax [Azithromycin Dihydrate]        DIET  Orders Placed This Encounter   Procedures   • Diet Order Low Fiber(GI Soft), Hepatic     Standing Status:   Standing     Number of Occurrences:   1     Order Specific Question:   Diet:     Answer:   Low Fiber(GI Soft) [2]     Order Specific Question:   Diet:     Answer:   Hepatic [9]        ACTIVITY  As tolerated.  Weight bearing as tolerated    LINES, DRAINS, AND WOUNDS  This is an automated list. Peripheral IVs will be removed prior to discharge.  Peripheral IV 07/10/19 22 G Right Forearm (Active)   Site Assessment Clean;Dry;Intact 7/15/2019  9:00 AM   Dressing Type Transparent 7/15/2019  9:00 AM   Line Status Saline locked 7/15/2019  9:00 AM   Dressing Status Clean;Dry;Intact 7/15/2019  9:00 AM   Dressing Intervention N/A 7/15/2019  9:00 AM   Infiltration Grading ([facility], [facility]) 0 7/15/2019  9:00 AM   Phlebitis Scale ([facility] Only) 0 7/15/2019  9:00 AM          Peripheral IV 07/10/19 22 G Right Forearm (Active)   Site Assessment Clean;Dry;Intact 7/15/2019  9:00 AM   Dressing Type Transparent 7/15/2019  9:00 AM   Line Status Saline locked 7/15/2019  9:00 AM   Dressing Status Clean;Dry;Intact 7/15/2019  9:00 AM   Dressing Intervention N/A 7/15/2019  9:00 AM   Infiltration Grading ([facility], [facility]) 0 7/15/2019  9:00 AM   Phlebitis Scale ([facility] Only) 0 7/15/2019  9:00 AM               MENTAL STATUS ON TRANSFER  Level of Consciousness: Alert  Orientation : Oriented x 4  Speech: Speech Clear    CONSULTATIONS  GI  Surgery  ID  Onc    PROCEDURES  EUS    IR-PERQ  CHOLANGIOGRAM NEW (INCLUDES ALL RADIOLOGY)   Final Result      Successful internalization of the biliary stent with placement of a removable metallic partially covered 10 mm x 60 mm wall flex stent.             PE:  Gen: AAOx3, NAD  Eyes: PELLA  Neck: no JVD, no lymphadenopathy  Cardia: RRR, no mrg  Lungs: CTAB, no rales, rhonci or wheezing  Abd: NABS, soft, non extended, no mass  EXT: No C/C/E, peripheral pulse 2+ b/l  Neuro: CN II-XII intact, non focal, reflex 2+ symmetrical  Skin: Intact, no lesion, warm  Psych: Appropriate.      LABORATORY  Lab Results   Component Value Date    SODIUM 138 07/14/2019    POTASSIUM 4.9 07/14/2019    CHLORIDE 100 07/14/2019    CO2 34 (H) 07/14/2019    GLUCOSE 91 07/14/2019    BUN 19 07/14/2019    CREATININE 0.58  07/14/2019    CREATININE 0.71 01/19/2011    GLOMRATE >59 01/19/2011        Lab Results   Component Value Date    WBC 7.5 07/12/2019    HEMOGLOBIN 11.7 (L) 07/12/2019    HEMATOCRIT 35.8 (L) 07/12/2019    PLATELETCT 267 07/12/2019        Total time of the discharge process exceeds 38 minutes.

## 2019-07-15 NOTE — DISCHARGE INSTRUCTIONS
Discharge Instructions    Discharged to Skilled Nursing Facility by medical transportation with escort. Discharged via wheelchair, hospital escort: Yes.  Special equipment needed: Cane    Be sure to schedule a follow-up appointment with your primary care doctor or any specialists as instructed.     Discharge Plan:   Diet Plan: Discussed  Activity Level: Discussed  Confirmed Follow up Appointment: Appointment Scheduled  Confirmed Symptoms Management: Discussed  Medication Reconciliation Updated: Yes  Influenza Vaccine Indication: Patient Refuses, Not indicated: Previously immunized this influenza season and > 8 years of age    I understand that a diet low in cholesterol, fat, and sodium is recommended for good health. Unless I have been given specific instructions below for another diet, I accept this instruction as my diet prescription.   Other diet: Low fiber (GI soft), Hepatic    Special Instructions: None    · Is patient discharged on Warfarin / Coumadin?   No     Depression / Suicide Risk    As you are discharged from this [facility] facility, it is important to learn how to keep safe from harming yourself.    Recognize the warning signs:  · Abrupt changes in personality, positive or negative- including increase in energy   · Giving away possessions  · Change in eating patterns- significant weight changes-  positive or negative  · Change in sleeping patterns- unable to sleep or sleeping all the time   · Unwillingness or inability to communicate  · Depression  · Unusual sadness, discouragement and loneliness  · Talk of wanting to die  · Neglect of personal appearance   · Rebelliousness- reckless behavior  · Withdrawal from people/activities they love  · Confusion- inability to concentrate     If you or a loved one observes any of these behaviors or has concerns about self-harm, here's what you can do:  · Talk about it- your feelings and reasons for harming yourself  · Remove any means that you might use to  hurt yourself (examples: pills, rope, extension cords, firearm)  · Get professional help from the community (Mental Health, Substance Abuse, psychological counseling)  · Do not be alone:Call your Safe Contact- someone whom you trust who will be there for you.  · Call your local CRISIS HOTLINE 126-4027 or 668-275-5284  · Call your local Children's Mobile Crisis Response Team [place] (816) 688-9010 or www.Plan[employer].ru  · Call the toll free National Suicide Prevention Hotlines   · National Suicide Prevention Lifeline 000-568-EBTS (2813)  · National Hope Line Network 800-SUICIDE (066-1576)

## 2019-08-08 NOTE — PROGRESS NOTES
Received referral from [facility]. Medications reviewed. No clinically significant interactions noted.       On the discharge summary it recommended her going home with lisinopril 5 mg once daily.  She has a lisinopril 10 mg at home also.  Having 2 prescriptions with different doses can increase the odds of making a medication error.    Asim Morrow M.S., Pharm.D., [facility], [facility]    This note was created using voice recognition software (Dragon). The accuracy of the dictation is limited by the abilities of the software. I have reviewed the note prior to signing, however some errors in grammar and context are still possible. If you have any questions related to this note please do not hesitate to contact our office.

## 2019-08-21 NOTE — PROGRESS NOTES
Oncology nurse navigation sent referral to to Dr. Hylton office at the request of Dr. Quinones at tumor board this morning.  The  has left a message with the patient already for scheduling for staging and treatment referrals.

## 2019-08-26 NOTE — ED TRIAGE NOTES
"Chief Complaint   Patient presents with   • Diarrhea     Pt reports symptoms started last week.   • LLQ Pain     5/10 pain started today   • Nausea     /69   Pulse 98   Temp 36.6 °C (97.9 °F) (Temporal)   Resp 16   Ht 1.651 m (5' 5\")   Wt 53 kg (116 lb 13.5 oz)   LMP 05/01/1985   SpO2 94%   Breastfeeding? No   BMI 19.44 kg/m²     Pt brought into triage by WC, pt diagnosed with pancreatic cancer but has not started treatment. VS as above, NAD, encouraged to return to the triage nurse or tech with any new complaints or symptoms. Pt taken to [facility].  "

## 2019-08-26 NOTE — LETTER
8/30/2019               Katelyn Hill  [address]  [facility] [facility] [address]        Dear Katelyn (MR#[id number])    As we have been unable to contact you by phone, this letter is sent in regards to your recent visit to the [facility] Emergency Department on 8/26/2019.  During the visit, tests were performed to assist the physician in a medical diagnosis.  A review of those tests requires that we notify you of the following:    Your urine culture and sensitivity was POSITIVE for a bacteria called Methicillin Resistant Staphylococcus aureus (MRSA), and further treatment may be necessary. The currently prescribed antibiotic (cephalexin) is not be effective in treating your infection. PLEASE CONTACT ME AS SOON AS POSSIBLE AT THE NUMBER BELOW.       Thank you for your cooperation in the matter.    Sincerely,  ED Culture Follow-Up Staff  Monique Watson, ShirleyD    [facility], Emergency Department  [address] [place] [id number]  [phone](ED Culture Line)  [phone]

## 2019-08-27 NOTE — ED NOTES
ERP re-eval complete.  Family here at BS.  Pt medicated as ordered.  Pt states understanding of dc instructions and f/u care. Pt assisted out by staff in .

## 2019-08-27 NOTE — ED PROVIDER NOTES
ED Provider Note    CHIEF COMPLAINT  Chief Complaint   Patient presents with   • Diarrhea     Pt reports symptoms started last week.   • LLQ Pain     5/10 pain started today   • Nausea       HPI  Katelyn Louise Hill is a 84 y.o. female who presents with loose bowel movement, left lower quadrant abdominal pain which worsened today prompting her visit.  Patient has known pancreatic cancer, is awaiting initiating treatment.  She developed a discomfort in the left lower abdomen different than her usual epigastric pain.  No associated chest pain or shortness of breath.  Her family felt this may have something to do with her drinking cream this morning given the patient is lactose intolerant.  They feel this may have exacerbated her pain as it has done so in the past.  No bloody emesis or stool.  She denies fever.  Pain described as intermittent and sharp in nature    REVIEW OF SYSTEMS    Constitutional: No fever  Respiratory: No shortness of breath acutely  Cardiac: No acute chest pain or syncope  Gastrointestinal: Pancreatic cancer.  Chronic epigastric pain.  New left lower quadrant pain  Musculoskeletal: No acute neck or back pain  Neurologic: Denies headache  Genitourinary: History of kidney stone       All other systems are negative.       PAST MEDICAL HISTORY  Past Medical History:   Diagnosis Date   • Arthritis    • Cataract    • Dyslipidemia    • Hypertension    • Kidney calculi    • Lactose intolerance    • Lung nodules     Dr. Chow, ashley, recommend yearly chest xray   • MEDICAL HOME    • OA (osteoarthritis)     right knee, shoulder   • Raynaud's disease /phenomenon    • Torn rotator cuff 2008    left, Dr. Montalvo       FAMILY HISTORY  Family History   Problem Relation Age of Onset   • Heart Disease Mother    • Heart Disease Father         enlarged heart   • Diabetes Son    • Cancer Maternal Grandmother         suicide    • Cancer Maternal Grandfather    • Cancer Paternal Grandmother         Lung cancer         SOCIAL HISTORY  Social History     Socioeconomic History   • Marital status:      Spouse name: Not on file   • Number of children: 2S   • Years of education: Not on file   • Highest education level: Not on file   Occupational History   • Occupation: Retired -      Employer: Retired   Social Needs   • Financial resource strain: Not on file   • Food insecurity:     Worry: Not on file     Inability: Not on file   • Transportation needs:     Medical: Not on file     Non-medical: Not on file   Tobacco Use   • Smoking status: Former Smoker     Packs/day: 1.00     Years: 30.00     Pack years: 30.00     Types: Cigarettes     Last attempt to quit: 1985     Years since quittin.6   • Smokeless tobacco: Never Used   • Tobacco comment: 1 ppd for 30 years,    Substance and Sexual Activity   • Alcohol use: No   • Drug use: No   • Sexual activity: Never   Lifestyle   • Physical activity:     Days per week: Not on file     Minutes per session: Not on file   • Stress: Not on file   Relationships   • Social connections:     Talks on phone: Not on file     Gets together: Not on file     Attends Buddhism service: Not on file     Active member of club or organization: Not on file     Attends meetings of clubs or organizations: Not on file     Relationship status: Not on file   • Intimate partner violence:     Fear of current or ex partner: Not on file     Emotionally abused: Not on file     Physically abused: Not on file     Forced sexual activity: Not on file   Other Topics Concern   • Not on file   Social History Narrative   • Not on file       SURGICAL HISTORY  Past Surgical History:   Procedure Laterality Date   • ERCP  7/10/2019    Procedure: ERCP (ENDOSCOPIC RETROGRADE CHOLANGIOPANCREATOGRAPHY) - WITH POSSIBLE BIOPSY SPHINCTEROTOMY, STENT PLACEMENT, STENT REMOVAL, STONE EXTRACTION, DILATION;  Surgeon: Braxton Melton M.D.;  Location: SURGERY [facility];  Service: EUS   •  "GASTROSCOPY  7/10/2019    Procedure: GASTROSCOPY;  Surgeon: Braxton Melton M.D.;  Location: [facility];  Service: EUS   • EGD W/ENDOSCOPIC ULTRASOUND  7/10/2019    Procedure: EGD, WITH ENDOSCOPIC US;  Surgeon: Braxton Melton M.D.;  Location: [facility];  Service: EUS   • EGD WITH ASP/BX  7/10/2019    Procedure: EGD, WITH ASPIRATION BIOPSY;  Surgeon: Braxton Melton M.D.;  Location: [facility];  Service: EUS   • ERCP IN OR N/A 7/5/2019    Procedure: ERCP (ENDOSCOPIC RETROGRADE CHOLANGIOPANCREATOGRAPHY);  Surgeon: Sean Nguyen D.O.;  Location: [facility];  Service: Gastroenterology   • ERCP IN OR N/A 7/3/2019    Procedure: ERCP (ENDOSCOPIC RETROGRADE CHOLANGIOPANCREATOGRAPHY);  Surgeon: Melissa Andrews M.D.;  Location: [facility];  Service: Gastroenterology   • APPENDECTOMY  1943   • KNEE ARTHROSCOPY      right   • SHOULDER ARTHROSCOPY      right   • TONSILLECTOMY         CURRENT MEDICATIONS  Home Medications     Reviewed by Alejandra Mckeon R.N. (Registered Nurse) on 08/26/19 at 1139  Med List Status: Complete   Medication Last Dose Status   lisinopril (PRINIVIL) 10 MG Tab 8/25/2019 Active   mirtazapine (REMERON) 15 MG Tab 8/25/2019 Active   Naproxen Sodium (ALEVE) 220 MG Cap 8/25/2019 Active                ALLERGIES  Allergies   Allergen Reactions   • Iodine Anaphylaxis and Swelling   • Aspirin    • Clindamycin    • Lactose      Stomach upset    • Penicillins      Hospitalized with reaction   • Robitussin [Guiatuss] Diarrhea   • Shellfish Allergy    • Tetanus Toxoid    • Zithromax [Azithromycin Dihydrate]        PHYSICAL EXAM  VITAL SIGNS: /85   Pulse 79   Temp 36.6 °C (97.9 °F) (Temporal)   Resp 15   Ht 1.651 m (5' 5\")   Wt 53 kg (116 lb 13.5 oz)   LMP 05/01/1985   SpO2 96%   Breastfeeding? No   BMI 19.44 kg/m²   Constitutional: Somewhat ill appearance.  No distress  HENT: No facial swelling, no epistaxis  Eyes: " Anicteric, no conjunctivitis.   No scleral icterus  Cardiovascular: Normal heart rate, Normal rhythm  Pulmonary:  No wheezing, No rales.   Gastrointestinal: Soft, epigastric and left lower quadrant tenderness, No pulsatile mass.  No distention  Skin: Warm, Dry, No cyanosis.   Neurologic: Speech is clear, follows commands, facial expression is symmetrical.   strength equal  Psychiatric: Initially anxious.  Currently calm and cooperative.  Normal affect  Musculoskeletal: No flank tenderness    EKG/Labs  Results for orders placed or performed during the hospital encounter of 08/26/19   CBC WITH DIFFERENTIAL   Result Value Ref Range    WBC 7.3 4.8 - 10.8 K/uL    RBC 4.30 4.20 - 5.40 M/uL    Hemoglobin 13.5 12.0 - 16.0 g/dL    Hematocrit 43.6 37.0 - 47.0 %    .4 (H) 81.4 - 97.8 fL    MCH 31.4 27.0 - 33.0 pg    MCHC 31.0 (L) 33.6 - 35.0 g/dL    RDW 52.1 (H) 35.9 - 50.0 fL    Platelet Count 234 164 - 446 K/uL    MPV 9.8 9.0 - 12.9 fL    Neutrophils-Polys 79.60 (H) 44.00 - 72.00 %    Lymphocytes 11.20 (L) 22.00 - 41.00 %    Monocytes 8.10 0.00 - 13.40 %    Eosinophils 0.50 0.00 - 6.90 %    Basophils 0.30 0.00 - 1.80 %    Immature Granulocytes 0.30 0.00 - 0.90 %    Nucleated RBC 0.00 /100 WBC    Neutrophils (Absolute) 5.80 2.00 - 7.15 K/uL    Lymphs (Absolute) 0.82 (L) 1.00 - 4.80 K/uL    Monos (Absolute) 0.59 0.00 - 0.85 K/uL    Eos (Absolute) 0.04 0.00 - 0.51 K/uL    Baso (Absolute) 0.02 0.00 - 0.12 K/uL    Immature Granulocytes (abs) 0.02 0.00 - 0.11 K/uL    NRBC (Absolute) 0.00 K/uL   COMP METABOLIC PANEL   Result Value Ref Range    Sodium 136 135 - 145 mmol/L    Potassium 3.9 3.6 - 5.5 mmol/L    Chloride 99 96 - 112 mmol/L    Co2 30 20 - 33 mmol/L    Anion Gap 7.0 0.0 - 11.9    Glucose 106 (H) 65 - 99 mg/dL    Bun 24 (H) 8 - 22 mg/dL    Creatinine 0.50 0.50 - 1.40 mg/dL    Calcium 9.8 8.5 - 10.5 mg/dL    AST(SGOT) 22 12 - 45 U/L    ALT(SGPT) 15 2 - 50 U/L    Alkaline Phosphatase 88 30 - 99 U/L    Total  Bilirubin 1.2 0.1 - 1.5 mg/dL    Albumin 4.3 3.2 - 4.9 g/dL    Total Protein 6.8 6.0 - 8.2 g/dL    Globulin 2.5 1.9 - 3.5 g/dL    A-G Ratio 1.7 g/dL   LIPASE   Result Value Ref Range    Lipase 20 11 - 82 U/L   URINALYSIS,CULTURE IF INDICATED   Result Value Ref Range    Color Yellow     Character Clear     Specific Gravity 1.022 <1.035    Ph 5.0 5.0 - 8.0    Glucose Negative Negative mg/dL    Ketones Negative Negative mg/dL    Protein Negative Negative mg/dL    Bilirubin Negative Negative    Urobilinogen, Urine 0.2 Negative    Nitrite Negative Negative    Leukocyte Esterase Small (A) Negative    Occult Blood Small (A) Negative    Micro Urine Req Microscopic    ESTIMATED GFR   Result Value Ref Range    GFR If African American >60 >60 mL/min/1.73 m 2    GFR If Non African American >60 >60 mL/min/1.73 m 2   URINE MICROSCOPIC (W/UA)   Result Value Ref Range    WBC 20-50 (A) /hpf    RBC 2-5 (A) /hpf    Bacteria Negative None /hpf    Epithelial Cells Negative /hpf    Hyaline Cast 0-2 /lpf         RADIOLOGY/PROCEDURES  CT-ABDOMEN-PELVIS WITH   Final Result      1.  Biliary stent and pneumobilia are again noted. Small low-attenuation areas in pancreatic head are again noted and unchanged.      2.  Renal cysts and right renal calcification again identified.      3.  Otherwise no new or acute abnormalities are appreciated.               COURSE & MEDICAL DECISION MAKING  Pertinent Labs & Imaging studies reviewed. (See chart for details)  CT scan of the abdomen and pelvis was unchanged from previous, no evidence of diverticulitis, free fluid or free air.  Etiology of the left lower quadrant abdominal pain is unknown.  Findings are consistent with urinary tract infection, she is prescribed Keflex and given first dose in the ER at request the patient and her family.  I reviewed the patient's chart with pharmacist regarding choice of antibiotic and past history of allergies.  Patient received contrast today, tolerated this well,  was pretreated with Benadryl and steroid.  Etiology of the diarrhea is unknown.  She is advised to return if worse and to follow-up with her regular doctor soon as possible.    FINAL IMPRESSION     1. Diarrhea, unspecified type     2. Intermittent left lower quadrant abdominal pain     3. Pancreatic mass     4. Urinary tract infection without hematuria, site unspecified                     Electronically signed by: Felice Terry, 8/26/2019 11:12 PM

## 2019-08-30 NOTE — ED NOTES
ED Positive Culture Follow-up/Notification Note:   Date: 8/30    Patient seen in the ED on 8/26/2019 for diarrhea x 1 wk, worsening new LLQ pain with h/o pancreatic cancer awaiting treatment initiation with chronic epigastric pain, and nausea. Patient drank cream n morning of presentation despite being lactose intolerant.    1. Diarrhea, unspecified type    2. Intermittent left lower quadrant abdominal pain    3. Pancreatic mass    4. Urinary tract infection without hematuria, site unspecified      Discharge Medication List as of 8/26/2019  6:44 PM      START taking these medications    Details   cephALEXin (KEFLEX) 250 MG Cap Take 1 Cap by mouth 4 times a day for 7 days., Disp-28 Cap, R-0, Print Rx Paper           Allergies: Iodine; Aspirin; Clindamycin; Lactose; Penicillins; Robitussin [guiatuss]; Shellfish allergy; Tetanus toxoid; and Zithromax [azithromycin dihydrate]    Vitals:    08/26/19 1407 08/26/19 1600 08/26/19 1717 08/26/19 1800   BP: 123/64 136/66 157/79 158/85   Pulse: 84 68 76 79   Resp: 16  15 15   Temp:       TempSrc:       SpO2: 97% 96% 96% 96%   Weight:       Height:           Final cultures:   Results     Procedure Component Value Units Date/Time    URINE CULTURE(NEW) [[id number]]  (Abnormal)  (Susceptibility) Collected:  08/26/19 1329    Order Status:  Completed Specimen:  Urine Updated:  08/28/19 0740     Significant Indicator POS     Source UR     Site -     Culture Result -      Methicillin Resistant Staphylococcus aureus  ,000 cfu/mL      Narrative:       CALL  Martinez  ER tel. ,  CALLED  ER tel.  08/28/2019, 07:38, RB PERF. RESULTS CALLED TO: [phone]    Susceptibility     Methicillin resistant staphylococcus aureus (1)     Antibiotic Interpretation Microscan Method Status    Nitrofurantoin Sensitive <=32 mcg/mL CR Final    Penicillin Resistant >8 mcg/mL CR Final    Daptomycin Sensitive <=0.5 mcg/mL CR Final    Trimeth/Sulfa Sensitive <=0.5/9.5 mcg/mL CR Final    Vancomycin Sensitive 1  mcg/mL CR Final    Tetracycline Resistant >8 mcg/mL CR Final                   URINALYSIS,CULTURE IF INDICATED [[id number]]  (Abnormal) Collected:  08/26/19 1329    Order Status:  Completed Specimen:  Blood Updated:  08/26/19 1428     Color Yellow     Character Clear     Specific Gravity 1.022     Ph 5.0     Glucose Negative mg/dL      Ketones Negative mg/dL      Protein Negative mg/dL      Bilirubin Negative     Urobilinogen, Urine 0.2     Nitrite Negative     Leukocyte Esterase Small     Occult Blood Small     Micro Urine Req Microscopic          Plan:   Isolated organism is resistant to prescribed therapy, with possible source of infection from the blood.    Unable to reach patient at home phone, mobile phone, as well as emergency contact. Left message urging patient to call back as soon as possible. Also sent letter.     Upon patient call back, will ask patient to follow up with blood cultures. Will ascertain if patient is systemically ill with symptoms of fever, chill, tachycardia, tachypnea, and malaise to determine if patient needs to return to the hospital. If patient without symptoms, will  to return to the emergency department as soon as possible if begins to experience them. Will also discuss culture result and change in antibiotics with patient.    New Rx:  Bactrim ds 1 tablet by mouth BID x 7 days #14, no refills - MD: Miguel Angel per protocol    Monique Watson, ShirleyD

## 2019-09-06 NOTE — NON-PROVIDER
"Patient was seen today in clinic with Dr. Quinones for consult.  Vitals signs and weight were obtained and pain assessment was completed.  Allergies and medications were reviewed with the patient.  Review of systems completed.     Vitals/Pain:  Vitals:    09/06/19 0931   BP: 158/57   BP Location: Right arm   Patient Position: Sitting   BP Cuff Size: Adult   Pulse: 81   Temp: 36.7 °C (98 °F)   TempSrc: Temporal   SpO2: 96%   Weight: 53.3 kg (117 lb 7.7 oz)   Height: 1.651 m (5' 5\")   Pain Score: No pain        Allergies:   Iodine; Aspirin; Clindamycin; Lactose; Penicillins; Robitussin [guiatuss]; Shellfish allergy; Tetanus toxoid; and Zithromax [azithromycin dihydrate]    Current Medications:  Current Outpatient Medications   Medication Sig Dispense Refill   • Naproxen Sodium (ALEVE) 220 MG Cap Take 2 Caps by mouth 1 time daily as needed (as needed for arthritis pain).     • mirtazapine (REMERON) 15 MG Tab Take 1 Tab by mouth every bedtime. 30 Tab 11   • lisinopril (PRINIVIL) 10 MG Tab Take 10 mg by mouth every day. Indications: High Blood Pressure Disorder       No current facility-administered medications for this encounter.          PCP:  Michael Richard, Horacio Ass't    "

## 2019-09-06 NOTE — PROGRESS NOTES
Nutrition Services:  New XRT Consult  84 year old female with diagnosis of unstaged pancreatic cancer - PET scan results pending.    Past Medical History:   Diagnosis Date   • Arthritis    • Cataract    • Dyslipidemia    • Hepatitis B    • Hypertension    • Lactose intolerance    • Lung nodules     ashley Correa, recommend yearly chest xray   • MEDICAL HOME    • OA (osteoarthritis)     right knee, shoulder   • Raynaud's disease /phenomenon    • Torn rotator cuff 2008    left, Dr. Katia Quinones asked me to see patient this morning regarding chronic diarrhea.  I visited with patient and son this morning.  Patient reports that most recently she has noticed unintentional weight loss related to chronic diarrhea which has persisted for the last 1-2 months.  She reports that several years ago she weighed 70 kg but she is uncertain of how much she weighed more recently.  She suspects weight was closer to 56 kg recently but again she is uncertain about this.  Patient reports that she has started drinking ensure plus BID and that she tries to eat three meals per day but appetite is somewhat decreased.  Breakfast is typically her largest meal consisting of eggs, pancakes, toast and often walls or sausage.  For lunch and dinner she has small meals consisting of ham and cheese sandwiches, meat, rice, potatoes, salad and vegetables.  She enjoys a wide variety of foods and does not report worsening diarrhea with any specific foods.    Discussed with patient that high fat foods could have trouble digesting or be poorly tolerated and result in worsening diarrhea.  I advised her to limit fried or greasy foods and to be mindful if the higher fat foods she is eating are worsening her symptoms. Advised eating smaller, more frequent meals and snacks and to incorporate soluble fiber and at least 6-8 cups of water per day.  We discussed the benefit of small, frequent meals and snacks focusing on high calorie and protein items  as well as the benefits of maintaining weight and lean muscle mass.  Handouts provided and patient encouraged to contact RD with further questions/concerns.  Of note, home health RD scheduled to see patient next week.    Assessment:  · Pertinent Labs 8/26: Glucose: 106, Bun: 24  · Pertinent Meds: Creon (3 caps by mount TID with meals), Remeron, lisinopril, bactrim  · Poor Dentition noted - multiple missing teeth  · Patient appears thin with prominent protruding clavicles.    Weight: 53.3 kg/117 pounds  Height: 5'5''  BMI: 19.5    Weight Change/Malnutrition Risk:   Patient presents with ~5% weight loss from suspected UBW of 56 kg in the last 2-3 months related to chronic diarrhea.  Weight loss is considered insignificant but is worth noting given diagnose, symptoms and thin appearance.  Patient is at risk for malnutrition.      PLAN/RECOMMEND -   • Increase meal and snack frequency and aim to eat every 2-3 hours or 4-5 times per day.  • Focus on high calorie and protein foods - handout provided. Be mindful of high fat items and how they contribute on chronic diarrhea.  • Incorporate soluble fiber with meals and snacks as well as adequate hydration, 6-8 cups of water per day.  Creon prescribed today and anticipate this should additionally help with ongoing diarrhea.  • Continue to incorporate ensure plus or comparable supplement 2x/day.       RD monitoring

## 2019-09-06 NOTE — CONSULTS
RADIATION ONCOLOGY CONSULT    DATE OF SERVICE: 9/6/2019    IDENTIFICATION:   84 year old with unresectable locally advanced pancreatic cancer adenocarcinoma     HISTORY OF PRESENT ILLNESS: I had the pleasure of seeing Ms. Hill today in consultation at the request of Dr. Avalos for pancreatic cancer.  Patient was originally admitted for obstructive jaundice in early July 2019 and was found to have elevated bilirubin to 14.5 and underwent ERCP initially followed by IR guided percutaneous drain placement with internalized stent by Dr. Navas.  MRI abdomen July 4, 2019 showed intrahepatic and extrahepatic ductal dilation as well as pancreatic head mass with no definite vascular encasement identified.  EUS 7/10/19 Biopsy by Dr. Melton from July 10, 2019 came back as invasive adenocarcinoma with EUS showing possible protal venous invasion [id number] hypoechoic mass.  Patient had CT abdomen pelvis on August 26, 2019 showed biliary stent in place with small areas of low-density in the region of the pancreatic head.  She had [id number].  We reviewed her imaging in GI tumor board and consensus was given frail and elderly age to do upfront chemoradiation therapy and consider resection in the future if she has significant shrinkage.  Currently having diarrhea, and weight loss.  Denies any abdominal pain at this time.    PAST MEDICAL HISTORY:   Past Medical History:   Diagnosis Date   • Arthritis    • Cataract    • Dyslipidemia    • Hepatitis B    • Hypertension    • Lactose intolerance    • Lung nodules     Dr. Chow, ashley, recommend yearly chest xray   • MEDICAL HOME    • OA (osteoarthritis)     right knee, shoulder   • Raynaud's disease /phenomenon    • Torn rotator cuff 2008    left, Dr. Montalvo       PAST SURGICAL HISTORY:  Past Surgical History:   Procedure Laterality Date   • ERCP  7/10/2019    Procedure: ERCP (ENDOSCOPIC RETROGRADE CHOLANGIOPANCREATOGRAPHY) - WITH POSSIBLE BIOPSY SPHINCTEROTOMY, STENT  PLACEMENT, STENT REMOVAL, STONE EXTRACTION, DILATION;  Surgeon: Braxton Melton M.D.;  Location: [facility];  Service: EUS   • GASTROSCOPY  7/10/2019    Procedure: GASTROSCOPY;  Surgeon: Braxton Melton M.D.;  Location: [facility];  Service: EUS   • EGD W/ENDOSCOPIC ULTRASOUND  7/10/2019    Procedure: EGD, WITH ENDOSCOPIC US;  Surgeon: Braxton Melton M.D.;  Location: [facility];  Service: EUS   • EGD WITH ASP/BX  7/10/2019    Procedure: EGD, WITH ASPIRATION BIOPSY;  Surgeon: Braxton Melton M.D.;  Location: [facility];  Service: EUS   • ERCP IN OR N/A 7/5/2019    Procedure: ERCP (ENDOSCOPIC RETROGRADE CHOLANGIOPANCREATOGRAPHY);  Surgeon: Sean Nguyen D.O.;  Location: [facility];  Service: Gastroenterology   • ERCP IN OR N/A 7/3/2019    Procedure: ERCP (ENDOSCOPIC RETROGRADE CHOLANGIOPANCREATOGRAPHY);  Surgeon: Melissa Andrews M.D.;  Location: [facility];  Service: Gastroenterology   • APPENDECTOMY  1943   • KNEE ARTHROSCOPY      right   • SHOULDER ARTHROSCOPY      right   • TONSILLECTOMY         CURRENT MEDICATIONS:  Current Outpatient Medications   Medication Sig Dispense Refill   • sulfamethoxazole-trimethoprim (BACTRIM DS) 800-160 MG tablet Take 1 Tab by mouth 2 times a day. 14 Tab 0   • pancrelipase, Lip-Prot-Amyl, (CREON) 9015-7925 units Cap DR Particles Take 3 Caps by mouth 3 times a day, with meals. 900 Cap 0   • Naproxen Sodium (ALEVE) 220 MG Cap Take 2 Caps by mouth 1 time daily as needed (as needed for arthritis pain).     • mirtazapine (REMERON) 15 MG Tab Take 1 Tab by mouth every bedtime. 30 Tab 11   • lisinopril (PRINIVIL) 10 MG Tab Take 10 mg by mouth every day. Indications: High Blood Pressure Disorder       No current facility-administered medications for this encounter.        ALLERGIES:    Iodine; Aspirin; Clindamycin; Lactose; Penicillins; Robitussin [guiatuss]; Shellfish allergy; Tetanus toxoid; and  "Zithromax [azithromycin dihydrate]    FAMILY HISTORY:    Family History   Problem Relation Age of Onset   • Heart Disease Mother    • Heart Disease Father         enlarged heart   • Diabetes Son    • Cancer Maternal Grandmother         suicide    • Cancer Maternal Grandfather    • Cancer Paternal Grandmother         Lung cancer    • Cancer Paternal Grandfather        SOCIAL HISTORY:    Social History     Tobacco Use   • Smoking status: Former Smoker     Packs/day: 1.00     Years: 30.00     Pack years: 30.00     Types: Cigarettes     Last attempt to quit: 1985     Years since quittin.7   • Smokeless tobacco: Never Used   • Tobacco comment: 1 ppd for 30 years, [id number]   Substance Use Topics   • Alcohol use: No   • Drug use: No         REVIEW OF SYSTEMS:  A greater than 10 point review of systems was completed in patient's chart on 2019 and scanned in to [facility].    The rest of the review of systems is negative and has been reviewed by me.  All are negative with relationship to this diagnosis with the exception of: Positive for weight loss, decreased appetite, fever, chills, difficulty swallowing, taste alteration, dry mouth, hoarseness, teeth problems, visual difficulty, abdominal pain, diarrhea, frequency, urgency, nocturia, incontinence, unsteady gait        PHYSICAL EXAM:    Vitals:    19 0931   BP: 158/57   BP Location: Right arm   Patient Position: Sitting   BP Cuff Size: Adult   Pulse: 81   Temp: 36.7 °C (98 °F)   TempSrc: Temporal   SpO2: 96%   Weight: 53.3 kg (117 lb 7.7 oz)   Height: 1.651 m (5' 5\")   Pain Score: No pain    ECOG0  0= Fully active, able to carry on all pre-disease performance without restriction.    PAIN:  0      GENERAL: No apparent distress.  HEENT:  Pupils are equal, round, and reactive to light.  Extraocular muscles   are intact. Sclerae nonicteric.  Conjunctivae pink.  Oral cavity, tongue   protrudes midline.   NECK:  Supple without evidence of thyromegaly.  NODES:  No " peripheral adenopathy of the neck, supraclavicular fossa bilaterally.  LUNGS:  Good effort   HEART:  Regular rate  ABDOMEN:  ND  EXTREMITIES:  Without Edema.  NEUROLOGIC:  Cranial nerves II through XII were intact. Normal stance and gait motor and sensory grossly within normal limits      IMAGIN19 CT a/p  1.  Biliary stent and pneumobilia are again noted. Small low-attenuation areas in pancreatic head are again noted and unchanged.    2.  Renal cysts and right renal calcification again identified.    3.  Otherwise no new or acute abnormalities are appreciated.    LABS:  Lab Results   Component Value Date/Time    WBC 7.3 2019 12:00 PM    RBC 4.30 2019 12:00 PM    HEMOGLOBIN 13.5 2019 12:00 PM    HEMATOCRIT 43.6 2019 12:00 PM    .4 (H) 2019 12:00 PM    MCH 31.4 2019 12:00 PM    MCHC 31.0 (L) 2019 12:00 PM    MPV 9.8 2019 12:00 PM    NEUTSPOLYS 79.60 (H) 2019 12:00 PM    LYMPHOCYTES 11.20 (L) 2019 12:00 PM    MONOCYTES 8.10 2019 12:00 PM    EOSINOPHILS 0.50 2019 12:00 PM    BASOPHILS 0.30 2019 12:00 PM      Lab Results   Component Value Date/Time    SODIUM 136 2019 12:00 PM    POTASSIUM 3.9 2019 12:00 PM    CHLORIDE 99 2019 12:00 PM    CO2 30 2019 12:00 PM    GLUCOSE 106 (H) 2019 12:00 PM    BUN 24 (H) 2019 12:00 PM    CREATININE 0.50 2019 12:00 PM    CREATININE 0.71 2011 10:30 AM    BUNCREATRAT 27 (H) 2011 10:30 AM    GLOMRATE >59 2011 10:30 AM        PATHOLOGY:  7/10/19  FINAL DIAGNOSIS:    A. Head of pancreas mass slides/remnants:         Malignant cells present, consistent with adenocarcinoma.  B. Head of pancreas mass core:         Invasive adenocarcinoma.    Comment: If additional testing is requested, please utilize Block B1.    IMPRESSION:   84 year old with unresectable locally advanced pancreatic cancer adenocarcinoma     RECOMMENDATIONS:   I discussed the  role of chemoradiation therapy in the treatment of unresectable pancreatic cancer.  I did explain that in healthier patients we typically like to use induction chemotherapy upfront followed by chemoradiation therapy followed by surgery in select cases.  We discussed her case in hepatobiliary tumor board with Dr. Mcfarland who feels that surgery and her frail state would not be advisable and would recommend upfront chemo rads.  I recommend staging CT chest and PET CT scans to stage her cancer.  If she has local regional disease then I would recommend chemoradiation therapy 5 and half weeks 50.4 Gy in 28 fractions with integrated boost around the vascular bundle.  I recommend that she follow-up with Dr. Boyer for her Xeloda radiosensitizer.    The patient would like to proceed forward with treatment and we will set up CT simulation for treatment planning imaging after she has staging work-up.  That will consist of supine immobilization, possibly IV contrast depending on kidney function and targeting requirements, appropriate skin surface marking for radiation treatment.     We will then complete the behind the scenes planning process over several days using appropriate image fusion, target delineation, dosimetry,  checks, and treatment scheduling.      We discussed the risks, benefits and side effects of treatment and the patient is amenable to treatment.  If patient has any questions or concerns, she should feel free to contact me.    Thank you for the opportunity to participate in her care.  If any questions or comments, please do not hesitate in calling.    CC: Dr. Boyer, Dr. Mcfarland, Dr. Melton

## 2019-09-10 NOTE — PROGRESS NOTES
An 84-year-old female was admitted to [facility] from 7/2/19-7/10/19 for Hematuria. Patient was then transferred to [facility] from 7/10/19-7/15/19 to treat Jaundice. Patient was then transferred to [facility] from 7/15/2019-8/2/2019. The patient was discharged home with [facility] health services.     The patient was not discharged home with any new medications.    Per discharge orders, patient was instructed to see her surgeon, Dr. Avalos, and then establish care with an oncologist. Patient is scheduled to see Dr. Avalos on 10/8/19, Despite numerous attempts to reach the patient to schedule an appointment with [facility] radiology, patient could not be reached. Patient started [facility] health services on 8/7/19.    [facility] patient advocate was able to successfully engage with patient's son, Mata, just once post-discharge.

## 2019-09-16 NOTE — TELEPHONE ENCOUNTER
Future Appointments       Provider Department Center    9/17/2019  2:00 PM [address] [facility] IMAGING - CT - [address]    9/18/2019 [facility] Samanta Saunders R.N. [facility]     9/18/2019 11:00 AM PAYAL Magallon [facility]     9/19/2019 10:00 AM Danna Berumen R.D. [facility]     9/23/2019 10:20 AM Monica Rodriguez M.D. [facility]    9/26/2019 [facility] Samanta Saunders R.N. [facility]     9/27/2019 1:00 PM Lamont Quinones M.D. [facility]     10/3/2019 [facility] Samanta Saunders R.N. [facility]     10/8/2019 1:00 PM Dany Avalos M.D. [facility] Department of Surgery         ESTABLISHED PATIENT PRE-VISIT PLANNING     Patient was NOT contacted to complete PVP.     Note: Patient will not be contacted if there is no indication to call.     1.  Reviewed notes from the last few office visits within the medical group: Yes    2.  If any orders were placed at last visit or intended to be done for this visit (i.e. 6 mos follow-up), do we have Results/Consult Notes?        •  Labs - Labs ordered, completed on 07/02/2019-08/26/2019 and results are in chart.   Note: If patient appointment is for lab review and patient did not complete labs, check with provider if OK to reschedule patient until labs completed.       •  Imaging - Imaging ordered, NOT completed. Patient advised to complete prior to next appointment.       •  Referrals - No referrals were ordered at last office visit.    3. Is this appointment scheduled as a Hospital Follow-Up? No    4.  Immunizations were updated in [facility] using WebIZ?: Yes       •  Web Iz Recommendations: FLU, TDAP, VARICELLA (Chicken Pox)  and SHINGRIX (Shingles)    5.  Patient is due for the following Health Maintenance Topics:   Health Maintenance Due   Topic Date Due   • IMM HEP B VACCINE (1 of 3 - Risk 3-dose series) 04/06/1954   • IMM ZOSTER VACCINES (1 of 2) 04/06/1985   • Annual Wellness  Visit  05/20/2015   • IMM INFLUENZA (1) 09/01/2019       6. Orders for overdue Health Maintenance topics pended in Pre-Charting? YES    7.  AHA (MDX) form printed for Provider? YES    8.  Patient was informed to arrive 15 min prior to their scheduled appointment and bring in their medication bottles.

## 2019-09-18 NOTE — PROGRESS NOTES
Spoke with Dr. Quinones this morning over the patients recent CT done yesterday per Dr. Quinones it is concerning for some metastatic lesions in liver and the adrenal.  Dr. Quinones concerned and wants to get patient moved up sooner to see him for symptom management and possible hospice.  Navigation scheduled 2 tentative appointments for her tomorrow or Friday.  Spoke with Agnieszka the  and she is going to confirm with the patient what works best for her and tiger text me with confirmation.

## 2019-09-20 NOTE — PROGRESS NOTES
Nutrition Services: Brief Update  Weight: 9/20 = 110 pounds  Weight Change: wt down 7 pounds/6% in the last two weeks which is considered severe.  Patient appears thin and malnourished.    I visited with patient this afternoon in XRT prior to Dr. Quinones's follow-up appointment.  Patients weight is down 7 pounds in the last two weeks.  She reports that her diarrhea is much better with creon but that she occasionally will have an episode of diarrhea.  She is not taking imodium - discussed talking with MD and using this as needed a. She consumes a good breakfast daily and reports that her appetite is up and down.  Currently she is drinking boost BID and trying to eat frequently as we discussed at our last visit.      Encouraged high fat food items as tolerated and for her to increase boost supplements to 3 times per day.     It appears the MD has referred patient to hospice care at this time and does not suggest radiation at this time.   As patient is not currently scheduled for treatment within [facility] RD will sign off at this point.   Please contact [phone]

## 2019-09-20 NOTE — NON-PROVIDER
Patient was seen today in clinic with Dr. Quinones for follow up.  Vitals signs and weight were obtained and pain assessment was completed.  Allergies and medications were reviewed with the patient.  Review of systems completed.     Vitals/Pain:  Vitals:    09/20/19 1335   BP: 128/75   BP Location: Right arm   Patient Position: Sitting   BP Cuff Size: Adult   Pulse: 99   Temp: 36.4 °C (97.6 °F)   TempSrc: Temporal   SpO2: 97%   Weight: 49.9 kg (110 lb)   Pain Score: 4=Slight-Moderate Pain        Allergies:   Iodine; Aspirin; Clindamycin; Lactose; Penicillins; Robitussin [guiatuss]; Shellfish allergy; Tetanus toxoid; and Zithromax [azithromycin dihydrate]    Current Medications:  Current Outpatient Medications   Medication Sig Dispense Refill   • loperamide (HM LOPERAMIDE HCL) 2 MG Cap Take 2 mg by mouth 1 time daily as needed for Diarrhea.     • sulfamethoxazole-trimethoprim (BACTRIM DS) 800-160 MG tablet Take 1 Tab by mouth 2 times a day. 14 Tab 0   • pancrelipase, Lip-Prot-Amyl, (CREON) 7440-7024 units Cap DR Particles Take 3 Caps by mouth 3 times a day, with meals. 900 Cap 0   • Naproxen Sodium (ALEVE) 220 MG Cap Take 2 Caps by mouth 1 time daily as needed (as needed for arthritis pain).     • lisinopril (PRINIVIL) 10 MG Tab Take 10 mg by mouth every day. Indications: High Blood Pressure Disorder       No current facility-administered medications for this encounter.          PCP:  Horacio Curiel Ass't

## 2019-09-20 NOTE — PROGRESS NOTES
RADIATION ONCOLOGY FOLLOW-UP    DATE OF SERVICE: 9/20/2019    IDENTIFICATION:   A 84 y.o. female with unresectable locally advanced pancreatic cancer adenocarcinoma found to have metastatic disease    HISTORY OF PRESENT ILLNESS: I had the pleasure of seeing Ms. Hill today in consultation at the request of Dr. Avalos for pancreatic cancer.  Patient was originally admitted for obstructive jaundice in early July 2019 and was found to have elevated bilirubin to 14.5 and underwent ERCP initially followed by IR guided percutaneous drain placement with internalized stent by Dr. Navas.  MRI abdomen July 4, 2019 showed intrahepatic and extrahepatic ductal dilation as well as pancreatic head mass with no definite vascular encasement identified.  EUS 7/10/19 Biopsy by Dr. Melton from July 10, 2019 came back as invasive adenocarcinoma with EUS showing possible protal venous invasion [id number] hypoechoic mass.  Patient had CT abdomen pelvis on August 26, 2019 showed biliary stent in place with small areas of low-density in the region of the pancreatic head.  She had CA-19-9 849.  We reviewed her imaging in GI tumor board and consensus was given frail and elderly age to do upfront chemoradiation therapy and consider resection in the future if she has significant shrinkage.  Currently having diarrhea, and weight loss.  Denies any abdominal pain at this time.    Interval History:  Patient had staging CT CAP 9/17/19 which shows right adrenal nodule, and new liver lesions showing metastatic disease. Patient is having decrease appetite and wt loss and 4/10 abdominal pain.    CURRENT MEDICATIONS:  Current Outpatient Medications   Medication Sig Dispense Refill   • loperamide (HM LOPERAMIDE HCL) 2 MG Cap Take 2 mg by mouth 1 time daily as needed for Diarrhea.     • sulfamethoxazole-trimethoprim (BACTRIM DS) 800-160 MG tablet Take 1 Tab by mouth 2 times a day. 14 Tab 0   • pancrelipase, Lip-Prot-Amyl, (CREON) 9770-1541 units  Cap DR Particles Take 3 Caps by mouth 3 times a day, with meals. 900 Cap 0   • Naproxen Sodium (ALEVE) 220 MG Cap Take 2 Caps by mouth 1 time daily as needed (as needed for arthritis pain).     • lisinopril (PRINIVIL) 10 MG Tab Take 10 mg by mouth every day. Indications: High Blood Pressure Disorder       No current facility-administered medications for this encounter.        ALLERGIES:  Iodine; Aspirin; Clindamycin; Lactose; Penicillins; Robitussin [guiatuss]; Shellfish allergy; Tetanus toxoid; and Zithromax [azithromycin dihydrate]    PHYSICAL EXAM:   /75 (BP Location: Right arm, Patient Position: Sitting, BP Cuff Size: Adult)   Pulse 99   Temp 36.4 °C (97.6 °F) (Temporal)   Wt 49.9 kg (110 lb)   LMP 05/01/1985   SpO2 97%   BMI 18.30 kg/m²     GENERAL: cachetic appearing, No apparent distress.  HEENT:  Pupils are equal, round, and reactive to light.  Extraocular muscles   are intact. Sclerae nonicteric.  Conjunctivae pink.  Oral cavity, tongue   protrudes midline.   NECK:  Supple without evidence of thyromegaly.  NODES:  No peripheral adenopathy of the neck, supraclavicular fossa bilaterally.  LUNGS:  Good effort  HEART:  Regular rate  ABDOMEN:  ND  EXTREMITIES:  Without Edema.  NEUROLOGIC:  Cranial nerves II through XII were intact. Normal stance and gait motor and sensory grossly within normal limits        LABORATORY DATA:   Lab Results   Component Value Date/Time    SODIUM 136 08/26/2019 12:00 PM    POTASSIUM 3.9 08/26/2019 12:00 PM    CHLORIDE 99 08/26/2019 12:00 PM    CO2 30 08/26/2019 12:00 PM    GLUCOSE 106 (H) 08/26/2019 12:00 PM    BUN 24 (H) 08/26/2019 12:00 PM    CREATININE 0.50 08/26/2019 12:00 PM    CREATININE 0.71 01/19/2011 10:30 AM    BUNCREATRAT 27 (H) 01/19/2011 10:30 AM    GLOMRATE >59 01/19/2011 10:30 AM     Lab Results   Component Value Date/Time    ALKPHOSPHAT 88 08/26/2019 12:00 PM    ASTSGOT 22 08/26/2019 12:00 PM    ALTSGPT 15 08/26/2019 12:00 PM    TBILIRUBIN 1.2 08/26/2019  12:00 PM      Lab Results   Component Value Date/Time    WBC 7.3 08/26/2019 12:00 PM    RBC 4.30 08/26/2019 12:00 PM    HEMOGLOBIN 13.5 08/26/2019 12:00 PM    HEMATOCRIT 43.6 08/26/2019 12:00 PM    .4 (H) 08/26/2019 12:00 PM    MCH 31.4 08/26/2019 12:00 PM    MCHC 31.0 (L) 08/26/2019 12:00 PM    MPV 9.8 08/26/2019 12:00 PM    NEUTSPOLYS 79.60 (H) 08/26/2019 12:00 PM    LYMPHOCYTES 11.20 (L) 08/26/2019 12:00 PM    MONOCYTES 8.10 08/26/2019 12:00 PM    EOSINOPHILS 0.50 08/26/2019 12:00 PM    BASOPHILS 0.30 08/26/2019 12:00 PM        RADIOLOGY DATA:  Ct-abdomen-pelvis With    Result Date: 8/26/2019 8/26/2019 4:23 PM HISTORY/REASON FOR EXAM:  Left lower quadrant pain diarrhea TECHNIQUE/EXAM DESCRIPTION: CT scan of the abdomen and pelvis with contrast. Contrast-enhanced helical scanning was obtained from the diaphragmatic domes through the pubic symphysis following the bolus administration of 100 mL of Optiray 350 nonionic contrast without complication. Low dose optimization technique was utilized for this CT exam including automated exposure control and adjustment of the mA and/or kV according to patient size. COMPARISON: 07/26/2019 FINDINGS: CT Abdomen: There is no evidence of focal consolidation or pleural effusion seen within the lung bases. The liver is normal in appearance. The spleen is normal. Small bilateral renal cysts and right renal calcification are again identified. The adrenal glands are normal. Biliary stent is again noted in place. Small areas of low density are again noted in the region of the pancreatic head. The aorta is normal in caliber.  No evidence of retroperitoneal adenopathy. Extensive calcific atherosclerosis is again noted. CT Pelvis: There is no evidence of bowel obstruction or inflammatory change. The appendix is visualized and appears normal. There is no evidence of free fluid.     1.  Biliary stent and pneumobilia are again noted. Small low-attenuation areas in pancreatic head are  again noted and unchanged. 2.  Renal cysts and right renal calcification again identified. 3.  Otherwise no new or acute abnormalities are appreciated.     Ct-chest (thorax) With    Result Date: 9/17/2019 9/17/2019 1:58 PM HISTORY/REASON FOR EXAM:  History of pancreatic cancer and 7 mm nodule left lower pulmonary lobe TECHNIQUE/EXAM DESCRIPTION: CT scan of the chest with contrast. Thin-section helical images were obtained from the lung apices through the adrenal glands following the bolus administration of 75 mL of Optiray 350 nonionic contrast. Low dose optimization technique was utilized for this CT exam including automated exposure control and adjustment of the mA and/or kV according to patient size. COMPARISON:  04/15/2017 FINDINGS: There is no evidence of focal consolidation or pleural effusion. 7 mm pulmonary nodule in left lower pulmonary lobe is identified with associated linear opacification extending to the pleural surface. Image 112. No new pulmonary nodules or masses are identified. There is no evidence of mediastinal or hilar adenopathy. There is no evidence of pericardial effusion. Dense calcification is noted in the liver. There is pneumobilia and partial visualization of biliary stent. Necrotic right adrenal mass is identified measuring 3.4 cm in diameter. Several low-attenuation lesions in the liver are identified. Largest measures 1.3 cm in diameter posteriorly in the liver and was not appreciated on the prior exam. Others were subtle and not well visualized on the prior CT.     1.  No change in 7 mm nodule in left lower pulmonary lobe which appears benign. 2.  Necrotic appearing right adrenal mass is identified which is suspicious for metastasis. 3.  Liver lesions are identified measuring up to 1.3 cm in diameter which are also suspicious for metastases. MRI could be obtained for further evaluation. 4.  Biliary stent and pneumobilia are again noted.     Pain Scale: 4/10  Pain Assessement:  Reassessment  Pain Location, Orientation and Scale: Abdomen: Mid : Acute : 4  What makes the pain better: medicine  What makes the pain worse: eating     IMPRESSION:    84 y.o. female with unresectable locally advanced pancreatic cancer adenocarcinoma found to have metastatic disease    RECOMMENDATIONS:   I discussed that given recent scan shows evidence of adrenal metastasis as well as liver metastasis I do not feel definitive intent radiation would have much value here.  She does have mild abdominal pain 4-10 but I do not feel that radiation at this time is necessary to help palliate her pain.  I do feel that given her significant weight loss and cachexia that she would best be served by hospice care at this point I will put a referral in for that.    Thank you for the opportunity to participate in her care.  If any questions or comments, please do not hesitate in calling.

## 2019-11-06 NOTE — PROGRESS NOTES
"Received report and assumed patient care. Patient is AOx4. No complaints of pain at this time. Assessment complete on 1L O2 NC. Patient's oxygen saturation upon assessment was in the 70s and required 1L O2 to bring her back above 90. Patient states she \"feels fine\" and is asymptomatic. All needs met at this time. Safety precautions and hourly rounding in place.   " Called and was able to set up appt for 12/02/19 at 1pm

## 2025-03-12 NOTE — PROGRESS NOTES
Hospital Medicine Daily Progress Note    Date of Service  7/8/2019    Chief Complaint  Abdominal pain, jaundice    Hospital Course    84 y.o. female With hyperlipidemia, hypertension, admitted 7/2/2019 with right upper quadrant and epigastric abdominal pain with progressive jaundice over the last 4 days, along with dark urine and pale bowel movements.  Initial work-up showed no leukocytosis, with significant transaminitis with ALT of 283, AST of 237, total bilirubin of 14.5, and alkaline phosphatase of 505.  Lipase was normal.  Right upper quadrant ultrasound showed intrahepatic biliary dilation with mildly dilated CBD, with gallbladder sludge with borderline thickened gallbladder wall, with no pericholecystic fluid to suggest cholecystitis.  Viral hepatitis panel showed reactive hepatitis B surface antigen, with negative hepatitis B core antibody.   MRCP was ordered, and GI was consulted. MRCP showed intrahepatic and extrahepatic biliary dilation with abrupt narrowing of the common bile duct at the pancreatic head with findings concerning for pancreatic head mass, with distended gallbladder, with no stone demonstrated in the gallbladder or biliary tree.  She had an unsuccessful ERCP, with plan for repeat one by a different endoscopist. MRI of the abdomen showed several irregular cystic structures in the pancreatic head without any discrete infiltrative necrotic head mass identified, with moderately dilated main pancreatic duct with dilated side branches. CA 19-9 is high at 849.3. Surgery was consulted. She had another attempt at ERCP, but after numerous attempts was unable to pass the ampulla as the guidewire kept hitting hard tissue and was unable to pass.  GI recommended IR percutaneous biliary drainage. HBV was detected with quantitative testing by [facility], 153 IU/mL. HBE Ag was negative. She had a percutaneous transhepatic cholangiogram, with placement of percutaneous drainage.  Biliary fluid Gram stain showed no  Symptoms: Patient has some of the symptoms of vertigo feeling off weaving she has had vertigo in the past just not experiencing same symptoms in the past     RN spoke to patient. Patient states she is experiences dizziness and off balance feeling- intermittently, but mainly in the morning when she is waking. Symptoms present x 1 week. Patient denies issues with blood pressures and has been staying well hydrated.     Informed patient she will need an appt with Dr. Davis to evaluate. Patient was hoping for recommendations over the phone rather than an appt. Informed patient she does need to be evaluated for this issue. She verbalized understanding. Offered appt on 3/17, patient declined. Appt scheduled on 3/18 at 1400. Patient states if her symptoms improve/resolve, she will call and cancel appt.    Encouraged the patient/family member to call/message our office with any questions or concerns.    TONYA Collazo RN           organisms. Surgery recommended no urgent surgical intervention as needed, and to follow-up with outpatient hepatobiliary surgery.  GI planning to do EUS during this hospitalization.             Interval Problem Update  7/8/2019 - I reviewed the patient's chart. There were no significant overnight events. Remains hemodynamically stable and afebrile. Stable on 3L O2 NC.  LFTs continue to slowly trend down.  Biliary Gram stain showed few gram-positive rods, and few yeast, cultures pending.    > I have personally seen and examined the patient today. She only has moderate pain on the site of the drain, well controlled with pain medications.  She denies any nausea or vomiting.  No chest pain or shortness of breath.  No fevers or chills.    Consultants/Specialty  GI  IR    Code Status  Full    Disposition  Monitor on the floors    Review of Systems  ROS     Pertinent positives/negatives as mentioned above.     A complete review of systems was personally done by me. All other systems were negative.       Physical Exam  Temp:  [36.3 °C (97.3 °F)-37.5 °C (99.5 °F)] 37 °C (98.6 °F)  Pulse:  [69-83] 83  Resp:  [13-17] 13  BP: (107-125)/(49-57) 110/49  SpO2:  [75 %-94 %] 93 %    Physical Exam   Constitutional: She is oriented to person, place, and time. She appears well-developed. No distress.   Cachectic. Body mass index is 20.14 kg/m².   HENT:   Head: Normocephalic and atraumatic.   Mouth/Throat: No oropharyngeal exudate.   Eyes: Pupils are equal, round, and reactive to light. Conjunctivae are normal.   Neck: Normal range of motion. Neck supple.   Cardiovascular: Normal rate and regular rhythm.  Exam reveals no gallop and no friction rub.    No murmur heard.  Pulmonary/Chest: Effort normal and breath sounds normal. No respiratory distress. She has no wheezes. She has no rales. She exhibits no tenderness.   Abdominal: Soft. Bowel sounds are normal. She exhibits no distension. There is tenderness (Minimal tenderness in the right  upper quadrant.  No peritoneal signs.). There is no rebound and no guarding.   Percutaneous biliary drain in place, draining dark-colored bilious fluid.  Minimal tenderness on the right upper quadrant.   Musculoskeletal: Normal range of motion. She exhibits no edema or tenderness.   Lymphadenopathy:     She has no cervical adenopathy.   Neurological: She is alert and oriented to person, place, and time. No cranial nerve deficit.   Skin: Skin is warm and dry. No rash noted. She is not diaphoretic. No erythema. No pallor.   Significantly jaundiced skin   Psychiatric: She has a normal mood and affect. Her behavior is normal. Judgment and thought content normal.   Nursing note and vitals reviewed.       I have performed the physical examination today 7/8/2019.  In review of yesterday's note, there are no new changes except as documented above.        Fluids    Intake/Output Summary (Last 24 hours) at 07/08/19 0723  Last data filed at 07/07/19 1900   Gross per 24 hour   Intake                0 ml   Output              451 ml   Net             -451 ml       Laboratory      Recent Labs      07/07/19   0223  07/08/19   0235   SODIUM  142  137   POTASSIUM  5.3  4.2   CHLORIDE  105  98   CO2  34*  32   GLUCOSE  91  137*   BUN  29*  29*   CREATININE  0.78  0.58   CALCIUM  9.9  9.8     Recent Labs      07/06/19   0745   APTT  25.9   INR  1.04               Imaging  IR-PERQ BILIARY CATH PLACEMT INT-EXT  (INCLUDES ALL RADIOLOGY)   Final Result      1. Ultrasound and fluoroscopic guided percutaneous transhepatic cholangiogram demonstrating distal common bile duct occlusion concordant with ERCP findings. Moderate dilatation of intrahepatic and extrahepatic biliary ducts.      2. Percutaneous biliary drainage with placement of an 8 [place] [facility] locking loop internal-external biliary stent-catheter. The catheter is connected to gravity drainage at this time. When hyperbilirubinemia resolves, the catheter can be capped to    allow  antegrade physiologic bile drainage. The distal common duct obstruction may be amenable to metallic self-expanding internal stent placement by antegrade technique or rendezvous endoscopic technique.         DX-PORTABLE FLUOROSCOPY < 1 HOUR   Preliminary Result         Portable fluoroscopy utilized for 3 seconds.      MR-ABDOMEN-WITH & W/O   Final Result         1.  Intrahepatic and extrahepatic ductal dilatation, consistent with known biliary obstruction. Gallbladder distention and sludge is likely related to obstruction.      2.  Several irregular cystic structures in the pancreatic head without a discrete infiltrative pancreatic head mass identified. Moderately dilated main pancreatic duct with dilated side branches. No definite vascular encasement identified. If clinically    indicated, pancreatic protocol CT may be helpful for further evaluation.      3.  Atherosclerosis.      4.  Bilateral renal cysts.               DX-PORTABLE FLUORO > 1 HOUR   Final Result      Fluoroscopic image(s) obtained during ERCP. Please see the patient's chart for full procedural details.      Fluoroscopy time 2.6 minutes.         [id number]   Final Result      1.  Intrahepatic and extrahepatic biliary dilation with abrupt narrowing of the common bile duct at the pancreatic head with findings concerning for pancreatic head mass.   2.  Distended gallbladder.   3.  No stone demonstrated in the gallbladder or biliary tree.   4.  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      US-RUQ   Final Result      1.  Intrahepatic biliary dilatation with mildly dilated CBD. This may be due to CBD stone, stricture or mass.   2.  Gallbladder sludge with borderline thickened gallbladder wall. No gallstones identified. No pericholecystic fluid to suggest cholecystitis.   3.  Simple right renal cyst.      DX-CHEST-PORTABLE (1 VIEW)   Final Result      No acute cardiopulmonary process is seen.      Atherosclerotic plaque.           Assessment/Plan  * Obstructive  jaundice- (present on admission)   Assessment & Plan    - with right upper quadrant pain and jaundice.  Ultrasound showed mildly dilated common bile duct and intrahepatic biliary dilation.  MRCP showed intrahepatic and extrahepatic biliary dilation with abrupt narrowing of the common bile duct at the pancreatic head with findings concerning for pancreatic head mass, with distended gallbladder, with no stone demonstrated in the gallbladder or biliary tree. No evidence of acute cholecystitis on ultrasound. No signs of cholangitis. 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  No discrete pancreatic mass seen on MRI.  -failed attempts at ERCP x2. Now has percutaneous biliary drainage.   -GI planning for EUS during this hospitalization.  Awaiting arrangements to transfer to [facility] for EUS.   -Will need outpatient referral/follow-up with hepatobiliary surgery (Dr. Pat).  -continue to trend LFTs.  -Continue symptom control with IV morphine PRN, and PRN oxycodone. Continue antiemetics.  -Continue to hold off on antibiotics as no signs of infection. Follow biliary culture.     Chronic hepatitis B (HCC)- (present on admission)   Assessment & Plan    -GI following, considering starting treatment.      Elevated LFTs- (present on admission)   Assessment & Plan    - Secondary to biliary obstruction of unknown etiology. Further work-up as above.     Severe protein-calorie malnutrition (HCC)- (present on admission)   Assessment & Plan    - RD consult.      HTN (hypertension)- (present on admission)   Assessment & Plan    -Continue home dose lisinopril.  Monitor blood pressure trend closely, and adjust antihypertensives if blood pressure remains elevated.     Dyslipidemia- (present on admission)   Assessment & Plan    - Not on home medications. Repeat lipid profile -  HDL 51, and LDL of 116, with total cholesterol 174.   - Reasonable to hold off on statin for now given  transaminitis.          VTE prophylaxis: SCD

## (undated) DEVICE — GOWN SURGEONS X-LARGE - DISP. (30/CA)

## (undated) DEVICE — CONTAINER, SPECIMEN, STERILE

## (undated) DEVICE — PROTECTOR ULNA NERVE - (36PR/CA)

## (undated) DEVICE — SYRINGE SAFETY 3 ML 18 GA X 1 1/2 BLUNT LL (100/BX 8BX/CA)

## (undated) DEVICE — SYRINGE SAFETY 5 ML 18 GA X 1-1/2 BLUNT LL (100/BX 4BX/CA)

## (undated) DEVICE — TUBE SUCTION YANKAUER  1/4 X 6FT (20EA/CA)"

## (undated) DEVICE — SENSOR SPO2 ADULT LNCS ADTX (20/BX) ORDER ITEM #19593

## (undated) DEVICE — SYRINGE DISP. 50CC LS - (40/BX)

## (undated) DEVICE — CANNULA W/ SUPPLY TUBING O2 - (50/CA)

## (undated) DEVICE — SUCTION INSTRUMENT YANKAUER BULBOUS TIP W/O VENT (50EA/CA)

## (undated) DEVICE — CANISTER SUCTION RIGID RED 1500CC (40EA/CA)

## (undated) DEVICE — MASK ANESTHESIA ADULT  - (100/CA)

## (undated) DEVICE — NEPTUNE 4 PORT MANIFOLD - (20/PK)

## (undated) DEVICE — KIT ANESTHESIA W/CIRCUIT & 3/LT BAG W/FILTER (20EA/CA)

## (undated) DEVICE — GLOVE, LITE (PAIR)

## (undated) DEVICE — EXTRACTOR PRO XL 9-12 MM ABOVE

## (undated) DEVICE — FILM CASSETTE ENDO

## (undated) DEVICE — KIT CUSTOM PROCEDURE SINGLE FOR ENDO  (15/CA)

## (undated) DEVICE — FORCEP RADIAL JAW 4 STANDARD CAPACITY W/NEEDLE 240CM (40EA/BX)

## (undated) DEVICE — BASIN EMESIS DISP. - (250/CA)

## (undated) DEVICE — ELECTRODE 850 FOAM ADHESIVE - HYDROGEL RADIOTRNSPRNT (50/PK)

## (undated) DEVICE — SYRINGE DISP. 60 CC LL - (30/BX, 12BX/CA)**WHEN THESE ARE GONE ORDER #500206**

## (undated) DEVICE — SPONGE GAUZE NON-STERILE 4X4 - (2000/CA 10PK/CA)

## (undated) DEVICE — SPHINCTERTOME TRUETOME 44 20MM PRELOAD JAG 035IN

## (undated) DEVICE — BITE BLOCK ADULT 60FR (100EA/CA)

## (undated) DEVICE — CANISTER SUCTION 3000ML MECHANICAL FILTER AUTO SHUTOFF MEDI-VAC NONSTERILE LF DISP  (40EA/CA)

## (undated) DEVICE — MICROKNIFE XL

## (undated) DEVICE — TUBING CLEARLINK DUO-VENT - C-FLO (48EA/CA)

## (undated) DEVICE — SYRINGE SAFETY 10 ML 18 GA X 1 1/2 BLUNT LL (100/BX 4BX/CA)

## (undated) DEVICE — SYRINGE DISP. 12 CC LL - (100/BX)

## (undated) DEVICE — GOWN SURGEONS LARGE - (32/CA)

## (undated) DEVICE — DRAPE X LONG COILED INSTRUMENT ORGANIZER EUS (20EA/CA)

## (undated) DEVICE — JAGWIRE 025 STRAIGHT (2/BX)

## (undated) DEVICE — JAGTOME RX 44 PRELOADED .035 X 260CM

## (undated) DEVICE — TUBE CONNECTING SUCTION - CLEAR PLASTIC STERILE 72 IN (50EA/CA)

## (undated) DEVICE — SET EXTENSION WITH 2 PORTS (48EA/CA) ***PART #2C8610 IS A SUBSTITUTE*****

## (undated) DEVICE — JELLY, KY 2 0Z STERILE

## (undated) DEVICE — HEAD HOLDER JUNIOR/ADULT

## (undated) DEVICE — NEEDLE EUS DELIVERY SYSTEM FNB PRE LOADED 22 GA